# Patient Record
Sex: MALE | Race: WHITE | NOT HISPANIC OR LATINO | Employment: FULL TIME | URBAN - METROPOLITAN AREA
[De-identification: names, ages, dates, MRNs, and addresses within clinical notes are randomized per-mention and may not be internally consistent; named-entity substitution may affect disease eponyms.]

---

## 2019-04-15 ENCOUNTER — PATIENT OUTREACH (OUTPATIENT)
Dept: GASTROENTEROLOGY | Facility: CLINIC | Age: 39
End: 2019-04-15

## 2019-04-15 NOTE — PROGRESS NOTES
Sent a my chart message to pt about unable to fill his Lialda prescription. Pt had not read my chart message. Left a message that he has not been seen for over 3 years and the provider he was seeing has left the [facility].  That will be happy to set him up with another provider to establish care.  That can not refill Lialda until he was seen. Left my name and number.

## 2019-10-01 ENCOUNTER — HEALTH MAINTENANCE LETTER (OUTPATIENT)
Age: 39
End: 2019-10-01

## 2020-08-24 ENCOUNTER — TRANSFERRED RECORDS (OUTPATIENT)
Dept: HEALTH INFORMATION MANAGEMENT | Facility: CLINIC | Age: 40
End: 2020-08-24

## 2020-08-24 ENCOUNTER — MEDICAL CORRESPONDENCE (OUTPATIENT)
Dept: HEALTH INFORMATION MANAGEMENT | Facility: CLINIC | Age: 40
End: 2020-08-24

## 2020-08-25 ENCOUNTER — TRANSFERRED RECORDS (OUTPATIENT)
Dept: HEALTH INFORMATION MANAGEMENT | Facility: CLINIC | Age: 40
End: 2020-08-25

## 2020-08-27 ENCOUNTER — TRANSCRIBE ORDERS (OUTPATIENT)
Dept: OTHER | Age: 40
End: 2020-08-27

## 2020-08-27 DIAGNOSIS — F43.9 PSYCHOLOGICAL STRESS: ICD-10-CM

## 2020-08-27 DIAGNOSIS — K51.011: Primary | ICD-10-CM

## 2020-08-31 NOTE — TELEPHONE ENCOUNTER
RECORDS RECEIVED FROM: Dr. Delores Alvarado- [facility]    DATE RECEIVED: 9/30/2020   NOTES STATUS DETAILS   OFFICE NOTE from referring provider Received 8/24/2020 Office visit with Dr. Alvarado    OFFICE NOTE from other specialist Internal 8/12/15 Office visit with Dr. Moshe Bhatia (Med[facility])     6/2/15 Office visit with INDIRA Escobar CNP ([facility])    DISCHARGE SUMMARY from hospital N/A    OPERATIVE REPORT Received    MEDICATION LIST Internal         ENDOSCOPY  N/A    COLONOSCOPY Received 5/20/15 ([facility])    ERCP N/A    EUS N/A    STOOL TESTING N/A    PERTINENT LABS Internal    PATHOLOGY REPORTS (RELATED) Internal    IMAGING (CT, MRI, EGD) N/A      REFERRAL INFORMATION    Date referral was placed:    Date all records received:    Date records were scanned into EPIC:    Date records were sent to Provider to review:    Date and recommendation received from provider:  LETTER SENT  SCHEDULE APPOINTMENT   Date patient was contacted to schedule:      9/9/2020 5:12pm Called Pt but no answer and was unable to LVM because mailbox was full. CSS will try again later. -Bhao     9/16/2020 3:47pm Call Pt, no answer and unable to leave message (full mailbox). -Bhao     9/25/2020 10:46am Call Pt, no answer and unable to leave message (full mailbox). -Bhao    9/28/2020 10:19am Called Pt; unable to LVM (mailbox full). -Bhao    9/29/2020 11:29am Callled and spoke with Pt; per Pt, no additional outside med recs. Closing encounter now. -Bhao

## 2020-09-30 ENCOUNTER — VIRTUAL VISIT (OUTPATIENT)
Dept: GASTROENTEROLOGY | Facility: CLINIC | Age: 40
End: 2020-09-30
Attending: FAMILY MEDICINE
Payer: COMMERCIAL

## 2020-09-30 ENCOUNTER — PRE VISIT (OUTPATIENT)
Dept: GASTROENTEROLOGY | Facility: CLINIC | Age: 40
End: 2020-09-30

## 2020-09-30 VITALS — WEIGHT: 220 LBS | HEIGHT: 72 IN | BODY MASS INDEX: 29.8 KG/M2

## 2020-09-30 DIAGNOSIS — E55.9 VITAMIN D DEFICIENCY: ICD-10-CM

## 2020-09-30 DIAGNOSIS — K51.00 ULCERATIVE PANCOLITIS WITHOUT COMPLICATION (H): Primary | ICD-10-CM

## 2020-09-30 RX ORDER — ACETAMINOPHEN 325 MG/1
325-650 TABLET ORAL EVERY 6 HOURS PRN
COMMUNITY

## 2020-09-30 RX ORDER — MESALAMINE 1.2 G/1
TABLET, DELAYED RELEASE ORAL
Qty: 120 TABLET | Refills: 11 | Status: SHIPPED | OUTPATIENT
Start: 2020-09-30 | End: 2021-03-29

## 2020-09-30 ASSESSMENT — MIFFLIN-ST. JEOR: SCORE: 1945.91

## 2020-09-30 ASSESSMENT — PAIN SCALES - GENERAL: PAINLEVEL: MILD PAIN (2)

## 2020-09-30 NOTE — NURSING NOTE
Chief Complaint   Patient presents with     Consult     psychological stress, ulcerative pancolitis w/ intermittent BRBPR x8-9 since 8/2020       Vitals:    09/30/20 1516   Weight: 99.8 kg (220 lb)   Height: 1.829 m (6')       Body mass index is 29.84 kg/m .    Ayan Moe, EMT

## 2020-09-30 NOTE — PROGRESS NOTES
"Raymond Aguayo is a 40 year old male who is being evaluated via a billable video visit.      The patient has been notified of following:     \"This video visit will be conducted via a call between you and your physician/provider. We have found that certain health care needs can be provided without the need for an in-person physical exam.  This service lets us provide the care you need with a video conversation.  If a prescription is necessary we can send it directly to your pharmacy.  If lab work is needed we can place an order for that and you can then stop by our lab to have the test done at a later time.    Video visits are billed at different rates depending on your insurance coverage.  Please reach out to your insurance provider with any questions.    If during the course of the call the physician/provider feels a video visit is not appropriate, you will not be charged for this service.\"    Patient has given verbal consent for Video visit? Yes  How would you like to obtain your AVS? MyChart  If you are dropped from the video visit, the video invite should be resent to: Text to cell phone: [phone]  Will anyone else be joining your video visit? No        Video-Visit Details    Type of service:  Video Visit    Video Start Time: 3:48 PM  Video End Time: 4:35 PM    Originating Location (pt. Location): Home    Distant Location (provider location):  [facility]     [facility] for [facility]    Marry Win MD    IBD CLINIC VISIT     CC/REFERRING MD:  Delores Alvarado  REASON FOR CONSULTATION: Establish care for ulcerative colitis    ASSESSMENT/PLAN:     Mild ulcerative pancolitis  We discussed how it would be prudent for him to stay on low-dose daily Lialda, to avoid worsening flares and to control low-level mucosal inflammation and Hakan is amenable to this adjustment. He should have a colonoscopy in about 3 months once he has healed from his flare over the " summer.  - increase Lialda back to 4.8g daily for three months until colonoscopy   - schedule colonoscopy in about 3 months    - if still significant inflammation, we would need to discuss changing medications  - get liver labs with next check  - start 2000 international unit(s) vitamin D daily and recheck in 3 month   - screen renal function every 6-12 months while on Lialda  - update vaccinations including pneumonia and shingles    Chronic low back pain  Started in his 20s and worse in the morning. Doesn't seem to correspond to flares, but does raise concern for ankylosing spondylitis. We will watch to see if his symptoms improve with having him maintain on 5-ASA. If not would get SI joint xrays and check HLA-B27. If findings are consistent with ankylosing spondylitis would then consider switch to biologic.    ATTENDING ATTESTATION:    Virtual visit for patient conducted via three way video conference with myself, patient and Dr. Win.   I reviewed the history and abbreviated physical exam and discussed the management with Dr. Win on 9/30/2020. I reviewed the note and there are no changes to the past medical, family or social history.  A complete 10 point review of systems was obtained. Please see the HPI for pertinent positives and negatives. All other systems were reviewed and were found to be negative. I agree with the documented findings and plan of care as outlined.    He has responded well to Lialda. Discussed importance of taking it long term. Will increase to 4.8 grams daily until repeat colonoscopy in 3 months. If mucosal healing seen will then decrease to 2.8 grams daily. If still inflammation then will discuss escalation of therapy. Follow low back pain as above. All questions answered and he agrees to the plan.    Colton Valentin MD  Inflammatory Bowel Disease Clinic  [facility]  Division of Gastroenterology, Hepatology and  Nutrition        ----------------------------------------------------    IBD HISTORY  Age at diagnosis: 2014  Extent of disease: colon  Current UC medications: Lialda, intermittent  Prior UC surgeries: none  Prior IBD Medications: none    DRUG MONITORING  TPMT enzyme activity: na    6-TGN/6-MMPN levels:na    Biologic concentration:na    DISEASE ASSESSMENT  Labs:  Recent Labs   Lab Test 08/12/15  1338   CRP <2.9     Endoscopic assessment: pancolitis, 2015  Enterography: na  Fecal calprotectin: na  C diff: na    sIBDQ:  No flowsheet data found.     IBD Health Care Maintenance:    Vaccinations:  All patients on biologics should avoid live vaccines.    -- Influenza (every year)  -- TdaP (every 10 years)  -- Pneumococcal Pneumonia (once plus booster at 5 years)  -- Yearly assessment for latent Tb (verbal screening and exam, PPD or QuantiFERON-Tb testing)    One time confirmation of immunity or serologies:  -- Hepatitis A (serologies or immunizations)  -- Hepatitis B (serologies or immunizations)  -- Varicella  -- MMR  -- HPV (all aged 18-26)  -- Meningococcal meningitis (all patients at risk for meningitis)--   -- ok for live vaccines    Bone mineral density screening   -- Recommend all patients supplement with calcium and vitamin D    Cancer Screening:  Colon cancer screening:  Given panUC, recommend patient undergo regular dysplasia surveillance   Next dysplasia screening is recommended 2020.    Skin cancer screening: Annual visual exam of skin by dermatologist since patient is immunocompromised    Depression Screening:  -- Over the last month, have you felt down, depressed, or hopeless?  -- Over the last month, have you felt little interest or pleasure doing things?     Misc:  -- Avoid tobacco use  -- Avoid NSAIDs as there is potentially a 25% chance of causing an IBD flare    Return to clinic in 3 months    Thank you for this consultation.  It was a pleasure to participate in the care of this patient; please contact  us with any further questions.  I spent a total of 45 minutes, face to face, was spent with this patient, >50% of which was counseling regarding the above delineated issues.    Discussed with Dr Valentin.    Marry Win MD  Internal Medicine PGY3  Pager: [phone]      HPI:   Hakan is very pleasant 40 year old man with a history of mild ulcerative colitis. He was diagnosed by biopsy in 2015 after developing bloody diarrhea following a bout of traveler's diarrhea in [facility] in fall 2014, and has been maintained on intermittent Lialda since then. He gets flares about once a year, and will resume Lialda under the care of his PCP and then taper off when he is feeling better. He saw Dr Bhatia here twice in 2015 and has not seen GI since then.    He had a flare in August with bloody diarrhea; had not had much bleeding in the past. His PCP at [facility] restarted him on Lialda (taking 1 pill BID, had started with 2 pills BID). This prompted his PCP to refer him back to GI. His prior flare was spring of 2019. 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 His worst flares are at the beginning or end of semester.    He has occasional urgency or loose stools since his flare (1 day in the past week). Normal stool pattern is 2-3 formed stools daily.    Does have chronic low back pain, worse in the morning. He used to run marathons and had low back pain then as well; feels it's worse since he has been running less.      SURGICAL HISTORY:  [place] teeth removal in 2009 with local anesthetic only.  Colonoscopy in 05/2015.      FAMILY HISTORY:  No family history of colon cancer or autoimmune disease.      SOCIAL HISTORY:   with 4 children, works as an  at the [employer]    ROS:    No fevers or chills  No weight loss  No blurry vision, double vision or change in vision  No sore throat  No lymphadenopathy  No headache,  paraesthesias, or weakness in a limb  No shortness of breath or wheezing  No chest pain or pressure  No arthralgias or myalgias  No rashes or skin changes  No odynophagia or dysphagia  No BRBPR, hematochezia, melena  No dysuria, frequency or urgency  No hot/cold intolerance or polyria  No anxiety or depression    Extra intestinal manifestations of IBD:  No uveitis/episcleritis  No aphthous ulcers   + arthritis   No erythema nodosum/pyoderma gangrenosum.     PERTINENT PAST MEDICAL HISTORY:  Past Medical History:   Diagnosis Date     Ulcerative colitis (H) 2015     Wrist fracture, right 1992    minor, sports       PREVIOUS SURGERIES:  Past Surgical History:   Procedure Laterality Date     COLONOSCOPY  5/2015     HC TOOTH EXTRACTION W/FORCEP  2009       PREVIOUS ENDOSCOPY:  2015 colon at [facility] with Frausto 1 pancolitis    ALLERGIES:   No Known Allergies    PERTINENT MEDICATIONS:    Current Outpatient Medications:      Lactobacillus Acid-Pectin (LACTOBACILLUS ACIDOPHILUS) TABS, Take 1 tablet by mouth 3 times daily (before meals), Disp: , Rfl:      Loratadine-Pseudoephedrine (CLARITIN-D 24 HOUR PO), , Disp: , Rfl:      mesalamine (LIALDA) 1.2 G EC tablet, Take 4 tablets (4,800 mg) by mouth every morning, Disp: 180 tablet, Rfl: 3     mesalamine (LIALDA) 1.2 G EC tablet, Take 2 tablets (2,400 mg) by mouth every morning, Disp: 180 tablet, Rfl: 3    SOCIAL HISTORY:  Social History     Socioeconomic History     Marital status:      Spouse name: Not on file     Number of children: Not on file     Years of education: Not on file     Highest education level: Not on file   Occupational History     Not on file   Social Needs     Financial resource strain: Not on file     Food insecurity     Worry: Not on file     Inability: Not on file     Transportation needs     Medical: Not on file     Non-medical: Not on file   Tobacco Use     Smoking status: Never Smoker     Smokeless tobacco: Never Used   Substance and Sexual Activity      Alcohol use: Yes     Comment: 1/day, beer or scotch     Drug use: No     Sexual activity: Not on file   Lifestyle     Physical activity     Days per week: Not on file     Minutes per session: Not on file     Stress: Not on file   Relationships     Social connections     Talks on phone: Not on file     Gets together: Not on file     Attends Zoroastrianism service: Not on file     Active member of club or organization: Not on file     Attends meetings of clubs or organizations: Not on file     Relationship status: Not on file     Intimate partner violence     Fear of current or ex partner: Not on file     Emotionally abused: Not on file     Physically abused: Not on file     Forced sexual activity: Not on file   Other Topics Concern     Not on file   Social History Narrative     Not on file       FAMILY HISTORY:  Family History   Problem Relation Age of Onset     Other Cancer No family hx of      Lung Cancer Paternal Grandfather         smoker, 55       Past/family/social history reviewed and no changes    PHYSICAL EXAMINATION:  Constitutional: aaox3, cooperative, pleasant, not dyspneic/diaphoretic, no acute distress  Vitals reviewed: There were no vitals taken for this visit.  Wt:   Wt Readings from Last 2 Encounters:   08/12/15 97.9 kg (215 lb 12.8 oz)   06/02/15 93.8 kg (206 lb 12.8 oz)       PERTINENT STUDIES:  Most recent CBC:  Recent Labs   Lab Test 08/12/15  1338   WBC 6.5   HGB 15.2   HCT 43.1        Most recent hepatic panel:  No lab results found.    Invalid input(s): DEMETRIS, ALP  Most recent creatinine:  Recent Labs   Lab Test 08/12/15  1338   CR 0.99

## 2020-09-30 NOTE — PATIENT INSTRUCTIONS
- increase Lialda to two pills twice a day for the next three months and then we will do a colonoscopy to check on the inflammation in your colon.  - schedule colonoscopy in about 3 months  - get liver labs with next check (you can do this in 3 months when we recheck your vitamin D)  - start 2000 IU vitamin D daily - you can buy this at the drug store - and recheck in 3 month   - screen renal function every 6-12 months while on Lialda  - make an appointment with your primary to get your flu shot and your second pneumonia shot. Check with your insurance company about getting the shingles shot (Shingrix) as well

## 2020-10-01 ENCOUNTER — TELEPHONE (OUTPATIENT)
Dept: GASTROENTEROLOGY | Facility: CLINIC | Age: 40
End: 2020-10-01

## 2020-10-01 DIAGNOSIS — Z11.59 ENCOUNTER FOR SCREENING FOR OTHER VIRAL DISEASES: Primary | ICD-10-CM

## 2020-10-01 NOTE — TELEPHONE ENCOUNTER
Attempted to contact patient to inform him of new start time for procedure. Instead of 8am with Jamie it will now be with Dr. Valentin at 7am as he is the ordering provider. Unable to leave message as VM is full and cannot receive additional messages. Will attempt to contact patient again tomorrow.

## 2020-12-31 DIAGNOSIS — Z11.59 ENCOUNTER FOR SCREENING FOR OTHER VIRAL DISEASES: ICD-10-CM

## 2020-12-31 LAB
SARS-COV-2 RNA SPEC QL NAA+PROBE: NORMAL
SPECIMEN SOURCE: NORMAL

## 2021-01-01 LAB
LABORATORY COMMENT REPORT: NORMAL
SARS-COV-2 RNA SPEC QL NAA+PROBE: NEGATIVE
SPECIMEN SOURCE: NORMAL

## 2021-01-04 ENCOUNTER — HOSPITAL ENCOUNTER (OUTPATIENT)
Facility: AMBULATORY SURGERY CENTER | Age: 41
Discharge: HOME | End: 2021-01-04
Attending: INTERNAL MEDICINE | Admitting: INTERNAL MEDICINE
Payer: COMMERCIAL

## 2021-01-04 VITALS
HEIGHT: 72 IN | DIASTOLIC BLOOD PRESSURE: 74 MMHG | HEART RATE: 79 BPM | SYSTOLIC BLOOD PRESSURE: 117 MMHG | TEMPERATURE: 97.2 F | BODY MASS INDEX: 29.8 KG/M2 | WEIGHT: 220 LBS | RESPIRATION RATE: 14 BRPM | OXYGEN SATURATION: 96 %

## 2021-01-04 LAB — COLONOSCOPY: NORMAL

## 2021-01-04 PROCEDURE — 88305 TISSUE EXAM BY PATHOLOGIST: CPT | Mod: GC | Performed by: PATHOLOGY

## 2021-01-04 PROCEDURE — 45380 COLONOSCOPY AND BIOPSY: CPT | Mod: 33

## 2021-01-04 RX ORDER — PROCHLORPERAZINE MALEATE 10 MG
10 TABLET ORAL EVERY 6 HOURS PRN
Status: CANCELLED | OUTPATIENT
Start: 2021-01-04

## 2021-01-04 RX ORDER — FENTANYL CITRATE 50 UG/ML
INJECTION, SOLUTION INTRAMUSCULAR; INTRAVENOUS PRN
Status: DISCONTINUED | OUTPATIENT
Start: 2021-01-04 | End: 2021-01-04 | Stop reason: HOSPADM

## 2021-01-04 RX ORDER — NALOXONE HYDROCHLORIDE 0.4 MG/ML
0.2 INJECTION, SOLUTION INTRAMUSCULAR; INTRAVENOUS; SUBCUTANEOUS
Status: CANCELLED | OUTPATIENT
Start: 2021-01-04 | End: 2021-01-05

## 2021-01-04 RX ORDER — ONDANSETRON 2 MG/ML
4 INJECTION INTRAMUSCULAR; INTRAVENOUS
Status: DISCONTINUED | OUTPATIENT
Start: 2021-01-04 | End: 2021-01-05 | Stop reason: HOSPADM

## 2021-01-04 RX ORDER — FLUMAZENIL 0.1 MG/ML
0.2 INJECTION, SOLUTION INTRAVENOUS
Status: CANCELLED | OUTPATIENT
Start: 2021-01-04 | End: 2021-01-04

## 2021-01-04 RX ORDER — SIMETHICONE
LIQUID (ML) MISCELLANEOUS PRN
Status: DISCONTINUED | OUTPATIENT
Start: 2021-01-04 | End: 2021-01-04 | Stop reason: HOSPADM

## 2021-01-04 RX ORDER — NALOXONE HYDROCHLORIDE 0.4 MG/ML
0.4 INJECTION, SOLUTION INTRAMUSCULAR; INTRAVENOUS; SUBCUTANEOUS
Status: CANCELLED | OUTPATIENT
Start: 2021-01-04 | End: 2021-01-05

## 2021-01-04 RX ORDER — ONDANSETRON 4 MG/1
4 TABLET, ORALLY DISINTEGRATING ORAL EVERY 6 HOURS PRN
Status: CANCELLED | OUTPATIENT
Start: 2021-01-04

## 2021-01-04 RX ORDER — LIDOCAINE 40 MG/G
CREAM TOPICAL
Status: DISCONTINUED | OUTPATIENT
Start: 2021-01-04 | End: 2021-01-05 | Stop reason: HOSPADM

## 2021-01-04 RX ORDER — ONDANSETRON 2 MG/ML
4 INJECTION INTRAMUSCULAR; INTRAVENOUS EVERY 6 HOURS PRN
Status: CANCELLED | OUTPATIENT
Start: 2021-01-04

## 2021-01-04 ASSESSMENT — MIFFLIN-ST. JEOR: SCORE: 1945.91

## 2021-01-05 LAB — COPATH REPORT: NORMAL

## 2021-01-06 ENCOUNTER — CONFIDENTIAL (OUTPATIENT)
Dept: GASTROENTEROLOGY | Facility: CLINIC | Age: 41
End: 2021-01-06

## 2021-01-06 ENCOUNTER — PATIENT OUTREACH (OUTPATIENT)
Dept: GASTROENTEROLOGY | Facility: CLINIC | Age: 41
End: 2021-01-06

## 2021-01-06 DIAGNOSIS — K51.90 ULCERATIVE COLITIS (H): Primary | ICD-10-CM

## 2021-01-06 NOTE — PROGRESS NOTES
Patient ws contacted to complete labs.           Pt needs follow up with me in in the next 2-4 weeks.     Prior to that we need CBC, LFTs, BMP, ESR, CRP and hepatitis B core Ab, hep B surface antigen, hep B surface antibody, TB quant and TPMT enzyme.     Please help arrange

## 2021-01-07 DIAGNOSIS — K51.90 ULCERATIVE COLITIS (H): ICD-10-CM

## 2021-01-07 DIAGNOSIS — E55.9 VITAMIN D DEFICIENCY: ICD-10-CM

## 2021-01-07 LAB
ALBUMIN SERPL-MCNC: 3.8 G/DL (ref 3.4–5)
ALP SERPL-CCNC: 96 U/L (ref 40–150)
ALT SERPL W P-5'-P-CCNC: 56 U/L (ref 0–70)
ANION GAP SERPL CALCULATED.3IONS-SCNC: 1 MMOL/L (ref 3–14)
AST SERPL W P-5'-P-CCNC: 20 U/L (ref 0–45)
BASOPHILS # BLD AUTO: 0 10E9/L (ref 0–0.2)
BASOPHILS NFR BLD AUTO: 0.8 %
BILIRUB DIRECT SERPL-MCNC: 0.1 MG/DL (ref 0–0.2)
BILIRUB SERPL-MCNC: 0.5 MG/DL (ref 0.2–1.3)
BUN SERPL-MCNC: 13 MG/DL (ref 7–30)
CALCIUM SERPL-MCNC: 9.1 MG/DL (ref 8.5–10.1)
CHLORIDE SERPL-SCNC: 107 MMOL/L (ref 94–109)
CO2 SERPL-SCNC: 32 MMOL/L (ref 20–32)
CREAT SERPL-MCNC: 0.96 MG/DL (ref 0.66–1.25)
CRP SERPL-MCNC: 4 MG/L (ref 0–8)
DEPRECATED CALCIDIOL+CALCIFEROL SERPL-MC: 14 UG/L (ref 20–75)
DIFFERENTIAL METHOD BLD: NORMAL
EOSINOPHIL # BLD AUTO: 0.1 10E9/L (ref 0–0.7)
EOSINOPHIL NFR BLD AUTO: 1 %
ERYTHROCYTE [DISTWIDTH] IN BLOOD BY AUTOMATED COUNT: 12 % (ref 10–15)
ERYTHROCYTE [SEDIMENTATION RATE] IN BLOOD BY WESTERGREN METHOD: 9 MM/H (ref 0–15)
GFR SERPL CREATININE-BSD FRML MDRD: >90 ML/MIN/{1.73_M2}
GLUCOSE SERPL-MCNC: 99 MG/DL (ref 70–99)
HBV CORE AB SERPL QL IA: NONREACTIVE
HBV SURFACE AB SERPL IA-ACNC: 98.23 M[IU]/ML
HBV SURFACE AG SERPL QL IA: NONREACTIVE
HCT VFR BLD AUTO: 44.2 % (ref 40–53)
HGB BLD-MCNC: 14.9 G/DL (ref 13.3–17.7)
IMM GRANULOCYTES # BLD: 0 10E9/L (ref 0–0.4)
IMM GRANULOCYTES NFR BLD: 0.4 %
LYMPHOCYTES # BLD AUTO: 1.1 10E9/L (ref 0.8–5.3)
LYMPHOCYTES NFR BLD AUTO: 21.6 %
MCH RBC QN AUTO: 29.2 PG (ref 26.5–33)
MCHC RBC AUTO-ENTMCNC: 33.7 G/DL (ref 31.5–36.5)
MCV RBC AUTO: 87 FL (ref 78–100)
MONOCYTES # BLD AUTO: 0.6 10E9/L (ref 0–1.3)
MONOCYTES NFR BLD AUTO: 12 %
NEUTROPHILS # BLD AUTO: 3.4 10E9/L (ref 1.6–8.3)
NEUTROPHILS NFR BLD AUTO: 64.2 %
NRBC # BLD AUTO: 0 10*3/UL
NRBC BLD AUTO-RTO: 0 /100
PLATELET # BLD AUTO: 273 10E9/L (ref 150–450)
POTASSIUM SERPL-SCNC: 4.2 MMOL/L (ref 3.4–5.3)
PROT SERPL-MCNC: 7.5 G/DL (ref 6.8–8.8)
RBC # BLD AUTO: 5.1 10E12/L (ref 4.4–5.9)
SODIUM SERPL-SCNC: 140 MMOL/L (ref 133–144)
WBC # BLD AUTO: 5.2 10E9/L (ref 4–11)

## 2021-01-07 PROCEDURE — 36415 COLL VENOUS BLD VENIPUNCTURE: CPT | Performed by: PATHOLOGY

## 2021-01-07 PROCEDURE — 85652 RBC SED RATE AUTOMATED: CPT | Performed by: PATHOLOGY

## 2021-01-07 PROCEDURE — 86140 C-REACTIVE PROTEIN: CPT | Performed by: PATHOLOGY

## 2021-01-07 PROCEDURE — 86481 TB AG RESPONSE T-CELL SUSP: CPT | Mod: 90 | Performed by: PATHOLOGY

## 2021-01-07 PROCEDURE — 99000 SPECIMEN HANDLING OFFICE-LAB: CPT | Performed by: PATHOLOGY

## 2021-01-07 PROCEDURE — 80076 HEPATIC FUNCTION PANEL: CPT | Performed by: PATHOLOGY

## 2021-01-07 PROCEDURE — 86706 HEP B SURFACE ANTIBODY: CPT | Mod: 90 | Performed by: PATHOLOGY

## 2021-01-07 PROCEDURE — 86704 HEP B CORE ANTIBODY TOTAL: CPT | Mod: 90 | Performed by: PATHOLOGY

## 2021-01-07 PROCEDURE — 87340 HEPATITIS B SURFACE AG IA: CPT | Mod: 90 | Performed by: PATHOLOGY

## 2021-01-07 PROCEDURE — 82657 ENZYME CELL ACTIVITY: CPT | Mod: 90 | Performed by: PATHOLOGY

## 2021-01-07 PROCEDURE — 80048 BASIC METABOLIC PNL TOTAL CA: CPT | Performed by: PATHOLOGY

## 2021-01-07 PROCEDURE — 82306 VITAMIN D 25 HYDROXY: CPT | Mod: 90 | Performed by: PATHOLOGY

## 2021-01-07 PROCEDURE — 85025 COMPLETE CBC W/AUTO DIFF WBC: CPT | Performed by: PATHOLOGY

## 2021-01-08 LAB
GAMMA INTERFERON BACKGROUND BLD IA-ACNC: 0.03 IU/ML
M TB IFN-G CD4+ BCKGRND COR BLD-ACNC: 9.97 IU/ML
M TB TUBERC IFN-G BLD QL: NEGATIVE
MITOGEN IGNF BCKGRD COR BLD-ACNC: 0.01 IU/ML
MITOGEN IGNF BCKGRD COR BLD-ACNC: 0.04 IU/ML

## 2021-01-10 LAB — TPMT BLD-CCNC: 24.4 U/ML (ref 24–44)

## 2021-01-15 ENCOUNTER — HEALTH MAINTENANCE LETTER (OUTPATIENT)
Age: 41
End: 2021-01-15

## 2021-01-27 ENCOUNTER — VIRTUAL VISIT (OUTPATIENT)
Dept: GASTROENTEROLOGY | Facility: CLINIC | Age: 41
End: 2021-01-27
Payer: COMMERCIAL

## 2021-01-27 VITALS — HEIGHT: 72 IN | WEIGHT: 220 LBS | BODY MASS INDEX: 29.8 KG/M2

## 2021-01-27 DIAGNOSIS — K51.00 ULCERATIVE PANCOLITIS WITHOUT COMPLICATION (H): Primary | ICD-10-CM

## 2021-01-27 PROCEDURE — 99214 OFFICE O/P EST MOD 30 MIN: CPT | Mod: 95 | Performed by: INTERNAL MEDICINE

## 2021-01-27 ASSESSMENT — MIFFLIN-ST. JEOR: SCORE: 1945.91

## 2021-01-27 ASSESSMENT — PAIN SCALES - GENERAL: PAINLEVEL: NO PAIN (0)

## 2021-01-27 NOTE — NURSING NOTE
Chief Complaint   Patient presents with     RECHECK     Follow up u/c.       Vitals:    01/27/21 1245   Weight: 99.8 kg (220 lb)   Height: 1.829 m (6')       Body mass index is 29.84 kg/m .                            MAO HOWARD, EMT

## 2021-01-27 NOTE — PROGRESS NOTES
.  IBD CLINIC VISIT     CC/REFERRING MD:  No ref. provider found  REASON FOR CONSULTATION: follow up UC    ASSESSMENT/PLAN    1. Ulcerative colitis:     Recent colonoscopy did show persistent nail and inflammation throughout the colon.  There was relative sparing of the rectum but this is likely due to patchy healing.  We discussed that even though he has been on the Lialda 4.8 g/day he still has inflammation in the colon and so therefore we need to escalate therapy.  He is willing to do this.  We discussed multiple biologic options including anti-TNF agents (infliximab and adalimumab), anti-Integrilin agents (vedolizumab), and antiinterleukin agents (ustekinumab).  Risks and benefits of all were discussed.  Patient would like to proceed with vedolizumab as long as his insurance will approve this.  We will therefore move forward with requesting a prior authorization for vedolizumab.    We will follow-up in 3 months time.  I would like him to continue on the Lialda 4.8 g/day.  We discussed that he will likely do better in remembering to take this if he takes all 4 pills at once.    I would like to check a flexible sigmoidoscopy to evaluate for mucosal healing in 6 to 9 months.    2. IBD dysplasia surveillance:   He will be due for this in 2022.    3.  Healthcare maintenance-we will refer him to our IBD pharmacist to discuss this in more detail.  We have discussed taking calcium and vitamin D daily.  He knows to avoid NSAIDs.    Return to clinic in 3 months    Thank you for this consultation.  It was a pleasure to participate in the care of this patient; please contact us with any further questions.      This note was created with voice recognition software, and while reviewed for accuracy, typos may remain.     Colton Valentin MD  Inflammatory Bowel Disease Program   Division of Gastroenterology, Hepatology and Nutrition  [facility]  Pager: [phone]      IBD HISTORY  Age at diagnosis: 2014  Extent  of disease: colon  Current UC medications: Lialda 4.8 gm/day  Prior UC surgeries: none  Prior IBD Medications: none    Most recent colonoscopy: 21:  Impression:          - Preparation of the colon was fair.                        - The examined portion of the ileum was normal.                        - Mild (Frausto Score 1) pancolitis ulcerative colitis.                        Biopsied.                        - The rectum is normal. Biopsied.                        - The examination was otherwise normal on direct and                        retroflexion views.                        - Consistent with mild pan-ulcerative colitis. Given                        rectal sparing Crohn's colitis can [place] considered but                        this is felt to be less likely. It is more likely that                        he has patchy healing of his UC on therapy.       Colon path:  A. CECUM AND ASCENDING COLON, BIOPSIES:   - Moderately active chronic colitis with cryptitis and crypt abscesses   (Analia grade 3)   - Negative for dysplasia     B. TRANSVERSE COLON, BIOPSIES:   - Moderately active chronic colitis with cryptitis and crypt abscesses   (Analia grade 3)   - Negative for dysplasia     C. DESCENDING AND SIGMOID COLON, BIOPSIES:   - Moderately active chronic colitis with cryptitis and crypt abscesses   (Analia grade 3)   - Negative for dysplasia     D. RECTUM, BIOPSIES:   - Mild focal active chronic colitis with rare cryptitis (Analia grade 2)   - Negative for dysplasia     DRUG MONITORING  TPMT enzyme activity:     6-TGN/6-MMPN levels:    Biologic concentration:    HPI:   Currently    Frausto score:   Stool freq: 1 (1-2 stools/day more than normal)  Rectal bleedin (Visible blood less than half of the time)  PGA: 1 (Mild)  Endoscopy: 1 (Mild)    Extra intestinal manifestations of IBD:  No uveitis/episcleritis  No aphthous ulcers   No arthritis   No erythema nodosum/pyoderma gangrenosum.     sIBDQ:  No flowsheet data found.      Last endoscopic activity:  Last histologic activity:      Pertinent labs / imaging:     ROS:    Constitutional, HEENT, cardiovascular, pulmonary, GI, , musculoskeletal, neuro, skin, endocrine and psych systems are negative, except as otherwise noted.    PHYSICAL EXAMINATION:  Constitutional: aaox3, cooperative, pleasant, not dyspneic/diaphoretic, no acute distress  Vitals reviewed: There were no vitals taken for this visit.  Wt:   Wt Readings from Last 2 Encounters:   01/04/21 99.8 kg (220 lb)   09/30/20 99.8 kg (220 lb)      Eyes: Sclera anicteric/injected  Respiratory: Unlabored breathing  Skin: no jaundice  Psych: Normal affect      PERTINENT PAST MEDICAL HISTORY:  Past Medical History:   Diagnosis Date     Ulcerative colitis (H) 2015     Wrist fracture, right 1992    minor, sports       PREVIOUS SURGERIES:  Past Surgical History:   Procedure Laterality Date     COLONOSCOPY  5/2015     COLONOSCOPY N/A 1/4/2021    Procedure: COLONOSCOPY, WITH  BIOPSY;  Surgeon: Colton Valentin MD;  Location: [facility]      TOOTH EXTRACTION W/FORCEP  2009       ALLERGIES:   No Known Allergies    PERTINENT MEDICATIONS:    Current Outpatient Medications:      acetaminophen (TYLENOL) 325 MG tablet, Take 325-650 mg by mouth every 6 hours as needed for mild pain, Disp: , Rfl:      Loratadine-Pseudoephedrine (CLARITIN-D 24 HOUR PO), , Disp: , Rfl:      mesalamine (LIALDA) 1.2 g EC tablet, Take 2 tablets (2,400 mg) by mouth 2 times daily for 90 days, THEN 1 tablet (1,200 mg) 2 times daily., Disp: 120 tablet, Rfl: 11     mesalamine (LIALDA) 1.2 G EC tablet, Take 2 tablets (2,400 mg) by mouth every morning (Patient taking differently: Take 1,200 mg by mouth 2 times daily ), Disp: 180 tablet, Rfl: 3    SOCIAL HISTORY:  Social History     Socioeconomic History     Marital status:      Spouse name: Not on file     Number of children: Not on file     Years of education: Not on file     Highest education level: Not on file    Occupational History     Not on file   Social Needs     Financial resource strain: Not on file     Food insecurity     Worry: Not on file     Inability: Not on file     Transportation needs     Medical: Not on file     Non-medical: Not on file   Tobacco Use     Smoking status: Never Smoker     Smokeless tobacco: Never Used   Substance and Sexual Activity     Alcohol use: Yes     Comment: 1/day, beer or scotch     Drug use: No     Sexual activity: Not on file   Lifestyle     Physical activity     Days per week: Not on file     Minutes per session: Not on file     Stress: Not on file   Relationships     Social connections     Talks on phone: Not on file     Gets together: Not on file     Attends Holiness service: Not on file     Active member of club or organization: Not on file     Attends meetings of clubs or organizations: Not on file     Relationship status: Not on file     Intimate partner violence     Fear of current or ex partner: Not on file     Emotionally abused: Not on file     Physically abused: Not on file     Forced sexual activity: Not on file   Other Topics Concern     Not on file   Social History Narrative     Not on file       FAMILY HISTORY:  Family History   Problem Relation Age of Onset     Lung Cancer Paternal Grandfather         smoker, 55     Colon Cancer No family hx of      Crohn's Disease No family hx of      Irritable Bowel Syndrome No family hx of      Inflammatory Bowel Disease No family hx of      Ulcerative Colitis No family hx of        Past/family/social history reviewed and no changes

## 2021-01-27 NOTE — PROGRESS NOTES
"Raymond Aguayo is a 40 year old male who is being evaluated via a billable video visit.      The patient has been notified of following:     \"This video visit will be conducted via a call between you and your physician/provider. We have found that certain health care needs can be provided without the need for an in-person physical exam.  This service lets us provide the care you need with a video conversation.  If a prescription is necessary we can send it directly to your pharmacy.  If lab work is needed we can place an order for that and you can then stop by our lab to have the test done at a later time.    If during the course of the call the physician/provider feels a video visit is not appropriate, you will not be charged for this service.\"     Patient confirmed that they are in [place] for today's visit yes    If the video visit is dropped, please send link to:   Text to cell phone: [phone]    Video-Visit Details  Type of service:  Video Visit    Video Start Time: 12:59 PM  Video End Time:  1:19 PM    Originating Location (pt. Location): Home    Distant Location (provider location):  [facility] [place]     Platform used: Kiana          "

## 2021-01-27 NOTE — PATIENT INSTRUCTIONS
It was nice to meet with you today at your video visit.    As we discussed we will move forward with seeking approval for the vedolizumab infusions.  My office will contact you about this.    We will schedule your visit with our pharmacist to discuss healthcare maintenance and inflammatory bowel disease.    Please continue the Lialda 4.8 g/day.  You can take all pills at once in the morning.    We will follow-up in 3 months to see how you are doing.    We will plan to repeat flexible sigmoidoscopy in 6 to 9 months to see if there is adequate healing in your colon.

## 2021-01-27 NOTE — LETTER
1/27/2021         RE: Raymond Aguayo  [address]  [place] [facility] [place] [address]        Dear Colleague,    Thank you for referring your patient, Raymond Aguayo, to the [facility] [place]. Please see a copy of my visit note below.    .  IBD CLINIC VISIT     CC/REFERRING MD:  No ref. provider found  REASON FOR CONSULTATION: follow up UC    ASSESSMENT/PLAN    1. Ulcerative colitis:     Recent colonoscopy did show persistent nail and inflammation throughout the colon.  There was relative sparing of the rectum but this is likely due to patchy healing.  We discussed that even though he has been on the Lialda 4.8 g/day he still has inflammation in the colon and so therefore we need to escalate therapy.  He is willing to do this.  We discussed multiple biologic options including anti-TNF agents (infliximab and adalimumab), anti-Integrilin agents (vedolizumab), and antiinterleukin agents (ustekinumab).  Risks and benefits of all were discussed.  Patient would like to proceed with vedolizumab as long as his insurance will approve this.  We will therefore move forward with requesting a prior authorization for vedolizumab.    We will follow-up in 3 months time.  I would like him to continue on the Lialda 4.8 g/day.  We discussed that he will likely do better in remembering to take this if he takes all 4 pills at once.    I would like to check a flexible sigmoidoscopy to evaluate for mucosal healing in 6 to 9 months.    2. IBD dysplasia surveillance:   He will be due for this in 2022.    3.  Healthcare maintenance-we will refer him to our IBD pharmacist to discuss this in more detail.  We have discussed taking calcium and vitamin D daily.  He knows to avoid NSAIDs.    Return to clinic in 3 months    Thank you for this consultation.  It was a pleasure to participate in the care of this patient; please contact us with any further questions.      This note was created with voice recognition  software, and while reviewed for accuracy, typos may remain.     Colton Valentin MD  Inflammatory Bowel Disease Program   Division of Gastroenterology, Hepatology and Nutrition  [facility]  Pager: [phone]      IBD HISTORY  Age at diagnosis: [age]  Extent of disease: colon  Current UC medications: Lialda 4.8 gm/day  Prior UC surgeries: none  Prior IBD Medications: none    Most recent colonoscopy: 1/4/21:  Impression:          - Preparation of the colon was fair.                        - The examined portion of the ileum was normal.                        - Mild (Frausto Score 1) pancolitis ulcerative colitis.                        Biopsied.                        - The rectum is normal. Biopsied.                        - The examination was otherwise normal on direct and                        retroflexion views.                        - Consistent with mild pan-ulcerative colitis. Given                        rectal sparing Crohn's colitis can [place] considered but                        this is felt to be less likely. It is more likely that                        he has patchy healing of his UC on therapy.       Colon path:  A. CECUM AND ASCENDING COLON, BIOPSIES:   - Moderately active chronic colitis with cryptitis and crypt abscesses   (Analia grade 3)   - Negative for dysplasia     B. TRANSVERSE COLON, BIOPSIES:   - Moderately active chronic colitis with cryptitis and crypt abscesses   (Analia grade 3)   - Negative for dysplasia     C. DESCENDING AND SIGMOID COLON, BIOPSIES:   - Moderately active chronic colitis with cryptitis and crypt abscesses   (Analia grade 3)   - Negative for dysplasia     D. RECTUM, BIOPSIES:   - Mild focal active chronic colitis with rare cryptitis (Analia grade 2)   - Negative for dysplasia     DRUG MONITORING  TPMT enzyme activity:     6-TGN/6-MMPN levels:    Biologic concentration:    HPI:   Currently    Frausto score:   Stool freq: 1 (1-2 stools/day more than normal)  Rectal  bleedin (Visible blood less than half of the time)  PGA: 1 (Mild)  Endoscopy: 1 (Mild)    Extra intestinal manifestations of IBD:  No uveitis/episcleritis  No aphthous ulcers   No arthritis   No erythema nodosum/pyoderma gangrenosum.     sIBDQ:  No flowsheet data found.     Last endoscopic activity:  Last histologic activity:      Pertinent labs / imaging:     ROS:    Constitutional, HEENT, cardiovascular, pulmonary, GI, , musculoskeletal, neuro, skin, endocrine and psych systems are negative, except as otherwise noted.    PHYSICAL EXAMINATION:  Constitutional: aaox3, cooperative, pleasant, not dyspneic/diaphoretic, no acute distress  Vitals reviewed: There were no vitals taken for this visit.  Wt:   Wt Readings from Last 2 Encounters:   21 99.8 kg (220 lb)   20 99.8 kg (220 lb)      Eyes: Sclera anicteric/injected  Respiratory: Unlabored breathing  Skin: no jaundice  Psych: Normal affect      PERTINENT PAST MEDICAL HISTORY:  Past Medical History:   Diagnosis Date     Ulcerative colitis (H)      Wrist fracture, right 1992    minor, sports       PREVIOUS SURGERIES:  Past Surgical History:   Procedure Laterality Date     COLONOSCOPY  2015     COLONOSCOPY N/A 2021    Procedure: COLONOSCOPY, WITH  BIOPSY;  Surgeon: Colton Valentin MD;  Location: [facility] OR      TOOTH EXTRACTION W/FORCE         ALLERGIES:   No Known Allergies    PERTINENT MEDICATIONS:    Current Outpatient Medications:      acetaminophen (TYLENOL) 325 MG tablet, Take 325-650 mg by mouth every 6 hours as needed for mild pain, Disp: , Rfl:      Loratadine-Pseudoephedrine (CLARITIN-D 24 HOUR PO), , Disp: , Rfl:      mesalamine (LIALDA) 1.2 g EC tablet, Take 2 tablets (2,400 mg) by mouth 2 times daily for 90 days, THEN 1 tablet (1,200 mg) 2 times daily., Disp: 120 tablet, Rfl: 11     mesalamine (LIALDA) 1.2 G EC tablet, Take 2 tablets (2,400 mg) by mouth every morning (Patient taking differently: Take 1,200 mg by  mouth 2 times daily ), Disp: 180 tablet, Rfl: 3    SOCIAL HISTORY:  Social History     Socioeconomic History     Marital status:      Spouse name: Not on file     Number of children: Not on file     Years of education: Not on file     Highest education level: Not on file   Occupational History     Not on file   Social Needs     Financial resource strain: Not on file     Food insecurity     Worry: Not on file     Inability: Not on file     Transportation needs     Medical: Not on file     Non-medical: Not on file   Tobacco Use     Smoking status: Never Smoker     Smokeless tobacco: Never Used   Substance and Sexual Activity     Alcohol use: Yes     Comment: 1/day, beer or scotch     Drug use: No     Sexual activity: Not on file   Lifestyle     Physical activity     Days per week: Not on file     Minutes per session: Not on file     Stress: Not on file   Relationships     Social connections     Talks on phone: Not on file     Gets together: Not on file     Attends Hoahaoism service: Not on file     Active member of club or organization: Not on file     Attends meetings of clubs or organizations: Not on file     Relationship status: Not on file     Intimate partner violence     Fear of current or ex partner: Not on file     Emotionally abused: Not on file     Physically abused: Not on file     Forced sexual activity: Not on file   Other Topics Concern     Not on file   Social History Narrative     Not on file       FAMILY HISTORY:  Family History   Problem Relation Age of Onset     Lung Cancer Paternal Grandfather         smoker, 55     Colon Cancer No family hx of      Crohn's Disease No family hx of      Irritable Bowel Syndrome No family hx of      Inflammatory Bowel Disease No family hx of      Ulcerative Colitis No family hx of        Past/family/social history reviewed and no changes      Raymond Aguayo is a 40 year old male who is being evaluated via a billable video visit.      The patient has been  "notified of following:     \"This video visit will be conducted via a call between you and your physician/provider. We have found that certain health care needs can be provided without the need for an in-person physical exam.  This service lets us provide the care you need with a video conversation.  If a prescription is necessary we can send it directly to your pharmacy.  If lab work is needed we can place an order for that and you can then stop by our lab to have the test done at a later time.    If during the course of the call the physician/provider feels a video visit is not appropriate, you will not be charged for this service.\"     Patient confirmed that they are in [place] for today's visit yes    If the video visit is dropped, please send link to:   Text to cell phone: [phone]    Video-Visit Details  Type of service:  Video Visit    Video Start Time: 12:59 PM  Video End Time:  1:19 PM    Originating Location (pt. Location): Home    Distant Location (provider location):  [facility]STROENTEROLOG[facility] [place]     Platform used: [employer]              Again, thank you for allowing me to participate in the care of your patient.        Sincerely,        Colton Valentin MD    "

## 2021-01-29 ENCOUNTER — PATIENT OUTREACH (OUTPATIENT)
Dept: GASTROENTEROLOGY | Facility: CLINIC | Age: 41
End: 2021-01-29

## 2021-01-29 DIAGNOSIS — K51.00 ULCERATIVE PANCOLITIS WITHOUT COMPLICATION (H): Primary | ICD-10-CM

## 2021-01-29 NOTE — PROGRESS NOTES
vedo therapy entered   Standing lab orders         Needs vedolizumab     Referral to damian for health care maintenance     Follow up in 3 months       Left a message  to check on where patient would like to infuse

## 2021-02-03 ENCOUNTER — VIRTUAL VISIT (OUTPATIENT)
Dept: PHARMACY | Facility: CLINIC | Age: 41
End: 2021-02-03
Attending: INTERNAL MEDICINE
Payer: COMMERCIAL

## 2021-02-03 DIAGNOSIS — J30.2 SEASONAL ALLERGIC RHINITIS, UNSPECIFIED TRIGGER: ICD-10-CM

## 2021-02-03 DIAGNOSIS — R52 INTERMITTENT PAIN: ICD-10-CM

## 2021-02-03 DIAGNOSIS — K51.00 ULCERATIVE PANCOLITIS WITHOUT COMPLICATION (H): Primary | ICD-10-CM

## 2021-02-03 PROCEDURE — 99607 MTMS BY PHARM ADDL 15 MIN: CPT | Performed by: PHARMACIST

## 2021-02-03 PROCEDURE — 99605 MTMS BY PHARM NP 15 MIN: CPT | Performed by: PHARMACIST

## 2021-02-03 NOTE — PROGRESS NOTES
Medication Therapy Management (MTM) Encounter    ASSESSMENT:                            Medication Adherence/Access: No issues identified    Ulcerative Colitis: Hakan would benefit from escalating therapy as indicated by Dr. Valentin. Reviewed Entyvio in depth as described below. Encouraged him to sign-up for Entyvio Connect in the event he needs this and provided information in AVS. His vaccines are mainly up-to-date. Discussed consideration for Shingrix and implications of off-label recommendation for this. Encouraged him to contact us if he is interested in pursuing as this may require facilitation of an order. This was previously discussed with him by Dr. Win on 9/30. It does appear hepatitis A is a vaccine consideration for him based on planned travel to [place]. If he is unable to confirm this [facility], could consider discussing a titer with his provider to confirm immune status. Would recommend vitamin D 25 mcg daily given latest vitamin D level, and he appears agreeable to this.      Given results of PHQ-2, discussed mental health. He will plan to discuss this with primary care given his interest in continued work-up for adult ADD.     Allergies: Unchanged.    Intermittent Pain: Stable based on report.    PLAN:                            1. Hakan to consider the following vaccines:   --  Shingrix (off-label recommendation)    2. Hakan to consider addition of 25 mcg of vitamin D daily     3. Mirtha to check-into [place]/Hepatitis A vaccination   -- Per the CDC, it does look like travelers who are over 40 (and/or are immunocompromised) should consider vaccination. Per [employer], hepatitis A vaccination is a consideration for those deployed to high risk areas. If he is unable to confirm this with [facility] records, could consider hepatitis A titers.    4. Entyvio connect information in AVS    Follow-up: 1 month after medication start, 1 year for health maintenance review    SUBJECTIVE/OBJECTIVE:                           Raymond Aguayo is a 40 year old male contacted via secure video for an initial visit. He was referred to me from Dr. Valentin.      Reason for visit: Health maintenance review and vedolizumab start    Allergies/ADRs: Reviewed in chart  Tobacco: He reports that he has never smoked. He has never used smokeless tobacco.  Alcohol: 7-9 beverages / week    Medication Adherence/Access: no issues reported    Ulcerative Colitis:   Mesalamine 2.4 g twice daily  Multivitamin daily     Hakan was recently seen by Dr. Valentin for a virtual visit on 1/27/20 and he also had a recent colonoscopy which indicated persistent inflammation in the colon. Determine need to escalate therapy and biologic options were discussed. Joint decision was made to proceed with vedolizumab pending insurance coverage. In April they are going to [place] (wife/two sons), and he is wondering if he needs any additional vaccines because of this (and planning Entyvio). He later notes he actually has four children.    We reviewed vedolizumab today including mechanism of action, general dosing, side effects (both common/serious), precautions and monitoring of both safety and efficacy. Discussed precautions of immunosuppression but provided context in terms of selectivity of vedolizumab. Discussed therapeutic drug monitoring as well as coverage of Entyvio in general. He would appreciate if I could include the EntyvioConnect information in the AVS. Provided time to ask questions and Hakan acknowledges all his questions were answered at the end of our visit today. His Entyvio authorization is still pending at this time, but we discussed that [facility] would likely be reaching out to discuss next steps once we receive a determination. Pre-biologic screening completed on 1/7/21.     Reviewed IBD HCM today as described below. Notes primary care through [place] and  background.     IBD Health Care Maintenance:    Vaccinations:  All patients on  biologics should avoid live vaccines.    -- Influenza (every year) 11/5/2020, up-to-date for 20-21 season  -- TdaP (every 10 years) 8/27/2020  -- Pneumococcal Pneumonia (once plus booster at 5 years)   -Prevnar-13 8/27/20   -Pneumovax-23 11/5/20, booster due in 11/2025  -- Yearly assessment for latent Tb (verbal screening and exam, PPD or QuantiFERON-Tb testing)   Negative 1/7/21    One time confirmation of immunity or serologies:  -- Hepatitis A (serologies or immunizations) unsure, reports two tours in [place] -- [employer]   -- Hepatitis B (serologies or immunizations) serologies indicate immunity 2021  -- Varicella had the chicken pox, consider Shingrix  -- MMR 8/1992    Due to the immunosuppression in this patient, I would not advise administration of live vaccines such as varicella/VZV, intranasal influenza, MMR, or yellow fever vaccine (if traveling).    Bone mineral density screening   -- Recommend all patients supplement with calcium and vitamin D   - acknowledges getting lots of dairy in his diet    Vitamin D Deficiency Screening Results:  Lab Results   Component Value Date    VITDT 14 (L) 01/07/2021      -- Given prior steroid use recommend DEXA if not already done    Cancer Screening:  Colon cancer screening: due in 2022 per Dr. Valentin.    Skin cancer screening: Annual visual exam of skin by dermatologist since patient is immunocompromised    Depression Screening:  -- Over the last month, have you felt down, depressed, or hopeless? A little bit   -- Over the last month, have you felt little interest or pleasure doing things? No     -- plan to see PCP (would also like to address adult ADD)    Misc:  -- Avoid tobacco use  -- Avoid NSAIDs as there is potentially a 25% chance of causing an IBD flare    Allergies:   Claritin-D once daily    Used to think these were seasonal, but not so much anymore. Does find this to be helpful most of the time, but he acknowledges occasional residual symptoms. No reported  concerns.    Intermittent Pain:   Acetaminophen as needed     No reported concerns. He is aware of avoiding NSAIDs (which he used to use frequently).  ----------------    I spent 60 minutes with this patient today. I offer these suggestions for consideration by his care team. A copy of the visit note was provided to the patient's referring provider.    The patient was sent via [employer] a summary of these recommendations.     Mirtha Pike PharmD, BCACP  [place] Pharmacist   [facility] Gastroenterology and Rheumatology  Phone: [phone]    Telemedicine Visit Details  Type of service:  Video Conference via [employer]  Start Time: 1:30 PM  End Time: 2:30 PM  Originating Location (patient location): Home  Distant Location (provider location):  [facility] [place]      Medication Therapy Recommendations  Ulcerative colitis (H)    Rationale: Preventive therapy - Needs additional medication therapy - Indication   Recommendation: Start Medication - Shingrix 50 MCG/0.5ML Susr   Status: Patient Agreed - Adherence/Education   Note: Consider Shingrix vaccine series          Rationale: Untreated condition - Needs additional medication therapy - Indication   Recommendation: Start Medication - Vitamin D (Cholecalciferol) 25 MCG (1000 UT) Caps   Status: Accepted - no CPA Needed   Note: Start vitamin D 1,000 units (25 mcg) by mouth daily

## 2021-02-06 NOTE — PATIENT INSTRUCTIONS
Recommendations from today's MTM visit:                                                    MTM (medication therapy management) is a service provided by a clinical pharmacist designed to help you get the most of out of your medicines.   Today we reviewed what your medicines are for, how to know if they are working, that your medicines are safe and how to make your medicine regimen as easy as possible.      1. Consider the Shingrix vaccine series. As we discussed this is an off-label recommendation given you are less than 50 years old. If you are interested in getting this, please let us know as it may require an order from Dr. Valentin.    2. Consider adding 25 mcg (or 1000 units) of vitamin D daily.    3. Mirtha will look into hepatitis A vaccination (possibly from the ) and whether or not the CDC recommends you get vaccinated prior to travel to [place].    -- update: It does appear this is a recommended vaccine based on CDC recommendations for the area. If you are unable to confirm the status of your hepatitis A vaccine with the [facility], please let me know and we can discuss checking a titer with your provider (either Dr. Valentin or primary care).    4. Here is the information for the Entyvio Connect program which can help you get access to copay assistance (if you qualify):    https://[email or web address]/Poptank Studiosconnect-form?gclid=[email or web address]&gclsrc=aw.ds    It was great to speak with you today.  I value your experience and would be very thankful for your time with providing feedback on our clinic survey. You may receive a survey via email or text message in the next few days.     Next MTM visit: 1 month after medication start for check-in,     To schedule another MTM appointment, please call the clinic directly or you may call the MTM scheduling line at [phone] or toll-free at [phone].     My Clinical Pharmacist's contact information:                                                       It was a pleasure talking with you today!  Please feel free to contact me with any questions or concerns you have.      Mirtha VillalbaD, BCACP  MTM Pharmacist   [facility]astro[facility] Rheumat[facility]  Phone: [phone]     No stage II

## 2021-02-09 ENCOUNTER — CARE COORDINATION (OUTPATIENT)
Dept: GASTROENTEROLOGY | Facility: CLINIC | Age: 41
End: 2021-02-09

## 2021-02-09 DIAGNOSIS — K51.00 ULCERATIVE PANCOLITIS WITHOUT COMPLICATION (H): Primary | ICD-10-CM

## 2021-02-09 NOTE — PROGRESS NOTES
I did have the Peer to Peer Review today.    Discussed rationale for vedolizumab instead of prednisone or azathioprine.    They indicated agreement but stated the insurance company will make the final decision.    Await decision.    Colton Valentin MD

## 2021-02-10 ENCOUNTER — PATIENT OUTREACH (OUTPATIENT)
Dept: GASTROENTEROLOGY | Facility: CLINIC | Age: 41
End: 2021-02-10

## 2021-02-10 RX ORDER — MULTIVITAMIN,THERAPEUTIC
1 TABLET ORAL DAILY
COMMUNITY

## 2021-02-10 NOTE — PROGRESS NOTES
Attempted to reach pt however  mailbox is full   Sent a my chart message.     Peer to peer completed   Prior authorization approved.  recommends moving forward with dosing.

## 2021-02-11 NOTE — PROGRESS NOTES
Mailbox is full  Sent another my chart message  Left a voice mail message on wife's voice mail.

## 2021-02-12 DIAGNOSIS — Z20.822 ENCOUNTER FOR LABORATORY TESTING FOR COVID-19 VIRUS: Primary | ICD-10-CM

## 2021-02-14 DIAGNOSIS — Z20.822 ENCOUNTER FOR LABORATORY TESTING FOR COVID-19 VIRUS: ICD-10-CM

## 2021-02-14 LAB
SARS-COV-2 RNA RESP QL NAA+PROBE: NORMAL
SPECIMEN SOURCE: NORMAL

## 2021-02-14 PROCEDURE — U0003 INFECTIOUS AGENT DETECTION BY NUCLEIC ACID (DNA OR RNA); SEVERE ACUTE RESPIRATORY SYNDROME CORONAVIRUS 2 (SARS-COV-2) (CORONAVIRUS DISEASE [COVID-19]), AMPLIFIED PROBE TECHNIQUE, MAKING USE OF HIGH THROUGHPUT TECHNOLOGIES AS DESCRIBED BY CMS-2020-01-R: HCPCS | Performed by: INTERNAL MEDICINE

## 2021-02-14 PROCEDURE — U0005 INFEC AGEN DETEC AMPLI PROBE: HCPCS | Performed by: INTERNAL MEDICINE

## 2021-02-15 LAB
LABORATORY COMMENT REPORT: NORMAL
SARS-COV-2 RNA RESP QL NAA+PROBE: NEGATIVE
SPECIMEN SOURCE: NORMAL

## 2021-02-17 ENCOUNTER — PATIENT OUTREACH (OUTPATIENT)
Dept: GASTROENTEROLOGY | Facility: CLINIC | Age: 41
End: 2021-02-17

## 2021-02-17 ENCOUNTER — INFUSION THERAPY VISIT (OUTPATIENT)
Dept: INFUSION THERAPY | Facility: CLINIC | Age: 41
End: 2021-02-17
Attending: INTERNAL MEDICINE
Payer: COMMERCIAL

## 2021-02-17 VITALS
OXYGEN SATURATION: 100 % | RESPIRATION RATE: 16 BRPM | HEART RATE: 93 BPM | DIASTOLIC BLOOD PRESSURE: 76 MMHG | SYSTOLIC BLOOD PRESSURE: 115 MMHG

## 2021-02-17 DIAGNOSIS — K51.00 ULCERATIVE PANCOLITIS WITHOUT COMPLICATION (H): Primary | ICD-10-CM

## 2021-02-17 LAB
ALBUMIN SERPL-MCNC: 3.8 G/DL (ref 3.4–5)
ALP SERPL-CCNC: 112 U/L (ref 40–150)
ALT SERPL W P-5'-P-CCNC: 52 U/L (ref 0–70)
AST SERPL W P-5'-P-CCNC: 19 U/L (ref 0–45)
BASOPHILS # BLD AUTO: 0.1 10E9/L (ref 0–0.2)
BASOPHILS NFR BLD AUTO: 1.1 %
BILIRUB DIRECT SERPL-MCNC: <0.1 MG/DL (ref 0–0.2)
BILIRUB SERPL-MCNC: 0.3 MG/DL (ref 0.2–1.3)
CRP SERPL-MCNC: 4 MG/L (ref 0–8)
DIFFERENTIAL METHOD BLD: NORMAL
EOSINOPHIL # BLD AUTO: 0.1 10E9/L (ref 0–0.7)
EOSINOPHIL NFR BLD AUTO: 1.5 %
ERYTHROCYTE [DISTWIDTH] IN BLOOD BY AUTOMATED COUNT: 12.4 % (ref 10–15)
ERYTHROCYTE [SEDIMENTATION RATE] IN BLOOD BY WESTERGREN METHOD: 12 MM/H (ref 0–15)
HCT VFR BLD AUTO: 42.3 % (ref 40–53)
HGB BLD-MCNC: 14.7 G/DL (ref 13.3–17.7)
IMM GRANULOCYTES # BLD: 0 10E9/L (ref 0–0.4)
IMM GRANULOCYTES NFR BLD: 0.2 %
LYMPHOCYTES # BLD AUTO: 1.5 10E9/L (ref 0.8–5.3)
LYMPHOCYTES NFR BLD AUTO: 32.3 %
MCH RBC QN AUTO: 29.3 PG (ref 26.5–33)
MCHC RBC AUTO-ENTMCNC: 34.8 G/DL (ref 31.5–36.5)
MCV RBC AUTO: 84 FL (ref 78–100)
MONOCYTES # BLD AUTO: 0.9 10E9/L (ref 0–1.3)
MONOCYTES NFR BLD AUTO: 19.9 %
NEUTROPHILS # BLD AUTO: 2.1 10E9/L (ref 1.6–8.3)
NEUTROPHILS NFR BLD AUTO: 45 %
NRBC # BLD AUTO: 0 10*3/UL
NRBC BLD AUTO-RTO: 0 /100
PLATELET # BLD AUTO: 333 10E9/L (ref 150–450)
PROT SERPL-MCNC: 7.6 G/DL (ref 6.8–8.8)
RBC # BLD AUTO: 5.01 10E12/L (ref 4.4–5.9)
WBC # BLD AUTO: 4.7 10E9/L (ref 4–11)

## 2021-02-17 PROCEDURE — 85652 RBC SED RATE AUTOMATED: CPT | Performed by: INTERNAL MEDICINE

## 2021-02-17 PROCEDURE — 85025 COMPLETE CBC W/AUTO DIFF WBC: CPT | Performed by: INTERNAL MEDICINE

## 2021-02-17 PROCEDURE — 96365 THER/PROPH/DIAG IV INF INIT: CPT

## 2021-02-17 PROCEDURE — 258N000003 HC RX IP 258 OP 636: Performed by: INTERNAL MEDICINE

## 2021-02-17 PROCEDURE — 250N000011 HC RX IP 250 OP 636: Performed by: INTERNAL MEDICINE

## 2021-02-17 PROCEDURE — 80076 HEPATIC FUNCTION PANEL: CPT | Performed by: INTERNAL MEDICINE

## 2021-02-17 PROCEDURE — 86140 C-REACTIVE PROTEIN: CPT | Performed by: INTERNAL MEDICINE

## 2021-02-17 RX ADMIN — VEDOLIZUMAB 300 MG: 300 INJECTION, POWDER, LYOPHILIZED, FOR SOLUTION INTRAVENOUS at 16:11

## 2021-02-17 ASSESSMENT — PAIN SCALES - GENERAL: PAINLEVEL: NO PAIN (0)

## 2021-02-17 NOTE — PATIENT INSTRUCTIONS
Dear Raymond Aguayo    Thank you for choosing [facility] ([facility]) for your infusion.  The following information is a summary of our appointment as well as important reminders.      Patient Education     Vedolizumab injection solution  Brand Name: Entyvio  What is this medicine?  VEDOLIZUMAB (Ve carmichael VINAY you mab) is used to treat ulcerative colitis and Crohn's disease in adult patients.  How should I use this medicine?  This medicine is for infusion into a vein. It is given by a health care professional in a hospital or clinic setting.  A special MedGuide will be given to you by the pharmacist with each prescription and refill. Be sure to read this information carefully each time.  Talk to your pediatrician regarding the use of this medicine in children. This medicine is not approved for use in children.  What side effects may I notice from receiving this medicine?  Side effects that you should report to your doctor or health care professional as soon as possible:    allergic reactions like skin rash, itching or hives, swelling of the face, lips, or tongue    breathing problems    changes in vision    chest pain    dark urine    depression, feelings of sadness    dizziness    general ill feeling or flu-like symptoms    irregular, missed, or painful menstrual periods    light-colored stools    loss of appetite, nausea    muscle weakness    problems with balance, talking, or walking    right upper belly pain    unusually weak or tired    yellowing of the eyes or skin  Side effects that usually do not require medical attention (report to your doctor or health care professional if they continue or are bothersome):    aches, pains    headache    stomach upset    tiredness  What may interact with this medicine?      steroid medicines like prednisone or cortisone    TNF-alpha inhibitors like natalizumab, adalimumab, and infliximab    vaccines  What if I miss a  dose?  It is important not to miss your dose. Call your doctor or health care professional if you are unable to keep an appointment.  Where should I keep my medicine?  This drug is given in a hospital or clinic and will not be stored at home.  What should I tell my health care provider before I take this medicine?  They need to know if you have any of these conditions:    diabetes    hepatitis B or history of hepatitis B infection    HIV or AIDS    immune system problems    infection or history of infections    liver disease    recently received or scheduled to receive a vaccine    scheduled to have surgery    tuberculosis, a positive skin test for tuberculosis or have recently been in close contact with someone who has tuberculosis    an unusual or allergic reaction to vedolizumab, other medicines, foods, dyes, or preservatives    pregnant or trying to get pregnant    breast-feeding  What should I watch for while using this medicine?  Your condition will be monitored carefully while you are receiving this medicine. Visit your doctor for regular check ups. Tell your doctor or healthcare professional if your symptoms do not start to get better or if they get worse.  Stay away from people who are sick. Call your doctor or health care professional for advice if you get a fever, chills or sore throat, or other symptoms of a cold or flu. Do not treat yourself.  In some patients, this medicine may cause a serious brain infection that may cause death. If you have any problems seeing, thinking, speaking, walking, or standing, tell your doctor right away. If you cannot reach your doctor, get urgent medical care.  NOTE:This sheet is a summary. It may not cover all possible information. If you have questions about this medicine, talk to your doctor, pharmacist, or health care provider. Copyright  2020 [employer]  EDUCATION POST BIOLOGICAL/CHEMOTHERAPY INFUSION  Call the triage nurse at your clinic or seek medical attention if  you have chills and/or temperature greater than or equal to 100.5, uncontrolled nausea/vomiting, diarrhea, constipation, dizziness, shortness of breath, chest pain, heart palpitations, weakness or any other new or concerning symptoms, questions or concerns.  You can not have any live virus vaccines prior to or during treatment or up to 6 months post infusion.  If you have an upcoming surgery, medical procedure or dental procedure during treatment, this should be discussed with your ordering physician and your surgeon/dentist.  If you are having any concerning symptom, if you are unsure if you should get your next infusion or wish to speak to a provider before your next infusion, please call your care coordinator or triage nurse at your clinic to notify them so we can adequately serve you.             We look forward in seeing you on your next appointment here at [facility] ([facility]).  Please don t hesitate to call us at [phone] to reschedule any of your appointments or to speak with one of the [facility] registered nurses.  It was a pleasure taking care of you today.    Sincerely,    [facility]  [facility]  [address] [place] [address]  [address]  Phone:  [phone]

## 2021-02-17 NOTE — PROGRESS NOTES
Patient may receive he 3 loading doses at [facility] then will need to transition.   Patient aware  First dose of vedo today the 17th.

## 2021-02-17 NOTE — PROGRESS NOTES
Nursing Note  Raymond Aguayo presents [facility] for:   Chief Complaint   Patient presents with     Infusion     entyvio     During [facility]'s [facility] appointment, orders from  were completed.  Frequency: week 0, 2, 6 then every 8 today is first dose.     Progress note:  Patient identification verified by name and date of birth.  Assessment completed.  Vitals recorded in Doc Flowsheets.  Patient was provided with education regarding medication/procedure and possible side effects.  Patient verbalized understanding.     present during visit today: Not Applicable.    Treatment Conditions: ~~~ NOTE: If the patient answers yes to any of the questions below, hold the infusion and contact ordering provider or on-call provider.    1. Have you recently had an elevated temperature, fever, chills, productive cough, coughing for 3 weeks or longer or hemoptysis, abnormal vital signs, night sweats,  chest pain or have you noticed a decrease in your appetite, unexplained weight loss or fatigue? No  2. Do you have any open wounds or new incisions? No  3. Do you have any recent or upcoming hospitalizations, surgeries or dental procedures? No  4. Do you currently have or recently have had any signs of illness or infection or are you on any antibiotics? No  5. Have you had any new, sudden or worsening abdominal pain? No  6. Have you or anyone in your household received a live vaccination in the past 4 weeks? Please note:  No live vaccines while on biologic/chemotherapy until 6 months after the last treatment.  Patient can receive the flu vaccine (shot only) and the pneumovax.  It is optimal for the patient to get these vaccines mid cycle, but they can be given at any time as long as it is not on the day of the infusion. No  7. Have you recently been diagnosed with any new nervous system diseases (ie. Multiple sclerosis, Guillain [place], seizures,  neurological changes) or cancer diagnosis? No  8. Are you on any form of radiation or chemotherapy? No  9. Are you pregnant or breast feeding or do you have plans of pregnancy in the future? N/A  10. Have you been having any signs of worsening depression or suicidal ideations?  (benlysta only) N/A  11. Have there been any other new onset medical symptoms? No  Premedications: were not ordered.  Drug Waste Record: No  Infusion length and rate:  infusion given over approximately 30 minutes  Labs: were drawn per orders.   Vascular access: peripheral IV placed today.    Is the next appt scheduled? Yes  Asymptomatic COVID test completed? No, last COVID test 2/14/21    Nurse report given to Wen Kumar at 1640. Care transferred at this time.     Post Infusion Assessment:  Patient tolerated infusion without incident.  Site patent and intact, free from redness, edema or discomfort.  No evidence of extravasations.  Access discontinued per protocol.     Discharge Plan:   Follow up plan of care with: ongoing infusions at [facility].  Discharge instructions were reviewed with patient.  Patient/representative verbalized understanding of discharge instructions and all questions answered.  Patient discharged from [facility] in stable condition.    Noemi Castanon, STEWART       Administrations This Visit     vedolizumab (ENTYVIO) 300 mg in sodium chloride 0.9 % 280 mL infusion     Admin Date  02/17/2021 Action  New Bag Dose  300 mg Rate  560 mL/hr Route  Intravenous Administered By  Noemi Castanon, RN                  /74   Pulse 83   Resp 16   SpO2 100%

## 2021-02-17 NOTE — LETTER
2/17/2021         RE: Raymond Aguayo  [address] [place]  [facility] [address]        Dear Colleague,    Thank you for referring your patient, Raymond Aguayo, to the [facility]. Please see a copy of my visit note below.    Nursing Note  Raymond Aguayo [facility] for:   Chief Complaint   Patient presents with     Infusion     entyvio     During [facility]'s [facility] appointment, orders from  were completed.  Frequency: week 0, 2, 6 then every 8 today is first dose.     Progress note:  Patient identification verified by name and date of birth.  Assessment completed.  Vitals recorded in Doc Flowsheets.  Patient was provided with education regarding medication/procedure and possible side effects.  Patient verbalized understanding.     present during visit today: Not Applicable.    Treatment Conditions: ~~~ NOTE: If the patient answers yes to any of the questions below, hold the infusion and contact ordering provider or on-call provider.    1. Have you recently had an elevated temperature, fever, chills, productive cough, coughing for 3 weeks or longer or hemoptysis, abnormal vital signs, night sweats,  chest pain or have you noticed a decrease in your appetite, unexplained weight loss or fatigue? No  2. Do you have any open wounds or new incisions? No  3. Do you have any recent or upcoming hospitalizations, surgeries or dental procedures? No  4. Do you currently have or recently have had any signs of illness or infection or are you on any antibiotics? No  5. Have you had any new, sudden or worsening abdominal pain? No  6. Have you or anyone in your household received a live vaccination in the past 4 weeks? Please note:  No live vaccines while on biologic/chemotherapy until 6 months after the last treatment.  Patient can receive the flu vaccine (shot only) and the pneumovax.  It is  optimal for the patient to get these vaccines mid cycle, but they can be given at any time as long as it is not on the day of the infusion. No  7. Have you recently been diagnosed with any new nervous system diseases (ie. Multiple sclerosis, Guillain [place], seizures, neurological changes) or cancer diagnosis? No  8. Are you on any form of radiation or chemotherapy? No  9. Are you pregnant or breast feeding or do you have plans of pregnancy in the future? N/A  10. Have you been having any signs of worsening depression or suicidal ideations?  (benlysta only) N/A  11. Have there been any other new onset medical symptoms? No  Premedications: were not ordered.  Drug Waste Record: No  Infusion length and rate:  infusion given over approximately 30 minutes  Labs: were drawn per orders.   Vascular access: peripheral IV placed today.    Is the next appt scheduled? Yes  Asymptomatic COVID test completed? No, last COVID test 2/14/21    Nurse report given to Wen Kumar at 1640. Care transferred at this time.     Post Infusion Assessment:  Patient tolerated infusion without incident.  Site patent and intact, free from redness, edema or discomfort.  No evidence of extravasations.  Access discontinued per protocol.     Discharge Plan:   Follow up plan of care with: ongoing infusions at [facility].  Discharge instructions were reviewed with patient.  Patient/representative verbalized understanding of discharge instructions and all questions answered.  Patient discharged from [facility] in stable condition.    Noemi Castanon RN       Administrations This Visit     vedolizumab (ENTYVIO) 300 mg in sodium chloride 0.9 % 280 mL infusion     Admin Date  02/17/2021 Action  New Bag Dose  300 mg Rate  560 mL/hr Route  Intravenous Administered By  Noemi Castanon RN                  /74   Pulse 83   Resp 16   SpO2 100%         Again, thank you for allowing me to  participate in the care of your patient.        Sincerely,        [facility]

## 2021-03-03 ENCOUNTER — INFUSION THERAPY VISIT (OUTPATIENT)
Dept: INFUSION THERAPY | Facility: CLINIC | Age: 41
End: 2021-03-03
Payer: COMMERCIAL

## 2021-03-03 VITALS
TEMPERATURE: 98.4 F | HEART RATE: 83 BPM | WEIGHT: 227.7 LBS | RESPIRATION RATE: 16 BRPM | SYSTOLIC BLOOD PRESSURE: 107 MMHG | DIASTOLIC BLOOD PRESSURE: 67 MMHG | BODY MASS INDEX: 30.88 KG/M2 | OXYGEN SATURATION: 96 %

## 2021-03-03 DIAGNOSIS — K51.00 ULCERATIVE PANCOLITIS WITHOUT COMPLICATION (H): Primary | ICD-10-CM

## 2021-03-03 LAB
ALBUMIN SERPL-MCNC: 3.8 G/DL (ref 3.4–5)
ALP SERPL-CCNC: 116 U/L (ref 40–150)
ALT SERPL W P-5'-P-CCNC: 64 U/L (ref 0–70)
AST SERPL W P-5'-P-CCNC: 30 U/L (ref 0–45)
BASOPHILS # BLD AUTO: 0.1 10E9/L (ref 0–0.2)
BASOPHILS NFR BLD AUTO: 0.8 %
BILIRUB DIRECT SERPL-MCNC: <0.1 MG/DL (ref 0–0.2)
BILIRUB SERPL-MCNC: 0.3 MG/DL (ref 0.2–1.3)
CRP SERPL-MCNC: 3.4 MG/L (ref 0–8)
DIFFERENTIAL METHOD BLD: NORMAL
EOSINOPHIL # BLD AUTO: 0.2 10E9/L (ref 0–0.7)
EOSINOPHIL NFR BLD AUTO: 2.3 %
ERYTHROCYTE [DISTWIDTH] IN BLOOD BY AUTOMATED COUNT: 12.3 % (ref 10–15)
ERYTHROCYTE [SEDIMENTATION RATE] IN BLOOD BY WESTERGREN METHOD: 10 MM/H (ref 0–15)
HCT VFR BLD AUTO: 42.5 % (ref 40–53)
HGB BLD-MCNC: 14.5 G/DL (ref 13.3–17.7)
IMM GRANULOCYTES # BLD: 0 10E9/L (ref 0–0.4)
IMM GRANULOCYTES NFR BLD: 0.3 %
LYMPHOCYTES # BLD AUTO: 1.5 10E9/L (ref 0.8–5.3)
LYMPHOCYTES NFR BLD AUTO: 23.5 %
MCH RBC QN AUTO: 29.5 PG (ref 26.5–33)
MCHC RBC AUTO-ENTMCNC: 34.1 G/DL (ref 31.5–36.5)
MCV RBC AUTO: 86 FL (ref 78–100)
MONOCYTES # BLD AUTO: 0.6 10E9/L (ref 0–1.3)
MONOCYTES NFR BLD AUTO: 9.6 %
NEUTROPHILS # BLD AUTO: 4.1 10E9/L (ref 1.6–8.3)
NEUTROPHILS NFR BLD AUTO: 63.5 %
NRBC # BLD AUTO: 0 10*3/UL
NRBC BLD AUTO-RTO: 0 /100
PLATELET # BLD AUTO: 295 10E9/L (ref 150–450)
PROT SERPL-MCNC: 7.7 G/DL (ref 6.8–8.8)
RBC # BLD AUTO: 4.92 10E12/L (ref 4.4–5.9)
WBC # BLD AUTO: 6.5 10E9/L (ref 4–11)

## 2021-03-03 PROCEDURE — 85025 COMPLETE CBC W/AUTO DIFF WBC: CPT | Performed by: INTERNAL MEDICINE

## 2021-03-03 PROCEDURE — 86140 C-REACTIVE PROTEIN: CPT | Performed by: INTERNAL MEDICINE

## 2021-03-03 PROCEDURE — 85652 RBC SED RATE AUTOMATED: CPT | Performed by: INTERNAL MEDICINE

## 2021-03-03 PROCEDURE — 96365 THER/PROPH/DIAG IV INF INIT: CPT

## 2021-03-03 PROCEDURE — 86708 HEPATITIS A ANTIBODY: CPT | Performed by: INTERNAL MEDICINE

## 2021-03-03 PROCEDURE — 250N000011 HC RX IP 250 OP 636: Performed by: INTERNAL MEDICINE

## 2021-03-03 PROCEDURE — 80076 HEPATIC FUNCTION PANEL: CPT | Performed by: INTERNAL MEDICINE

## 2021-03-03 PROCEDURE — 258N000003 HC RX IP 258 OP 636: Performed by: INTERNAL MEDICINE

## 2021-03-03 RX ADMIN — VEDOLIZUMAB 300 MG: 300 INJECTION, POWDER, LYOPHILIZED, FOR SOLUTION INTRAVENOUS at 15:38

## 2021-03-03 ASSESSMENT — PAIN SCALES - GENERAL: PAINLEVEL: NO PAIN (0)

## 2021-03-03 NOTE — PROGRESS NOTES
Nursing Note  Raymondpatti Aguayo presents [facility] for:   Chief Complaint   Patient presents with     Infusion     Entyvio     During [facility]'s [facility] appointment, orders from Dr. Valentin were completed.  Frequency: today is dose 2 of 2, initiation - then every 8 weeks    Progress note:  Patient identification verified by name and date of birth.  Assessment completed.  Vitals recorded in Doc Flowsheets.  Patient was provided with education regarding medication/procedure and possible side effects.  Patient verbalized understanding.     present during visit today: Not Applicable.    Treatment Conditions: ~~~ NOTE: If the patient answers yes to any of the questions below, hold the infusion and contact ordering provider or on-call provider.    1. Have you recently had an elevated temperature, fever, chills, productive cough, coughing for 3 weeks or longer or hemoptysis, abnormal vital signs, night sweats,  chest pain or have you noticed a decrease in your appetite, unexplained weight loss or fatigue? No  2. Do you have any open wounds or new incisions? No  3. Do you have any recent or upcoming hospitalizations, surgeries or dental procedures? No  4. Do you currently have or recently have had any signs of illness or infection or are you on any antibiotics? No  5. Have you had any new, sudden or worsening abdominal pain? No  6. Have you or anyone in your household received a live vaccination in the past 4 weeks? Please note:  No live vaccines while on biologic/chemotherapy until 6 months after the last treatment.  Patient can receive the flu vaccine (shot only) and the pneumovax.  It is optimal for the patient to get these vaccines mid cycle, but they can be given at any time as long as it is not on the day of the infusion. No  7. Have you recently been diagnosed with any new nervous system diseases (ie. Multiple sclerosis, Guillain [place],  seizures, neurological changes) or cancer diagnosis? No  8. Are you on any form of radiation or chemotherapy? No  9. Are you pregnant or breast feeding or do you have plans of pregnancy in the future? No  10. Have you been having any signs of worsening depression or suicidal ideations?  (benlysta only) NA  11. Have there been any other new onset medical symptoms? No      Premedications: were not ordered.    Drug Waste Record: No    Infusion length and rate:  infusion given over approximately 30 minutes    Labs: were drawn per orders.     Vascular access: peripheral IV placed today.    Is the next appt scheduled? No - scheduling messaged, patient will contact them - number given to patient  Asymptomatic COVID test completed? No    Post Infusion Assessment:  Patient tolerated infusion without incident.  Site patent and intact, free from redness, edema or discomfort.  No evidence of extravasations.  Access discontinued per protocol.     Discharge Plan:   Follow up plan of care with: ongoing infusions at [facility].  Discharge instructions were reviewed with patient.  Patient/representative verbalized understanding of discharge instructions and all questions answered.  Patient discharged [facility] in stable condition.    Administrations This Visit     vedolizumab (ENTYVIO) 300 mg in sodium chloride 0.9 % 280 mL infusion     Admin Date  03/03/2021 Action  New Bag Dose  300 mg Rate  560 mL/hr Route  Intravenous Administered By  Glendy Ford RN Hope Balcos, RN        /67   Pulse 83   Temp 98.4  F (36.9  C) (Oral)   Resp 16   Wt 103.3 kg (227 lb 11.2 oz)   SpO2 96%   BMI 30.88 kg/m

## 2021-03-04 LAB — HAV IGG SER QL IA: REACTIVE

## 2021-04-14 ENCOUNTER — INFUSION THERAPY VISIT (OUTPATIENT)
Dept: INFUSION THERAPY | Facility: CLINIC | Age: 41
End: 2021-04-14
Attending: INTERNAL MEDICINE
Payer: COMMERCIAL

## 2021-04-14 VITALS
HEART RATE: 103 BPM | WEIGHT: 227.8 LBS | OXYGEN SATURATION: 100 % | TEMPERATURE: 97.9 F | SYSTOLIC BLOOD PRESSURE: 118 MMHG | RESPIRATION RATE: 16 BRPM | DIASTOLIC BLOOD PRESSURE: 78 MMHG | BODY MASS INDEX: 30.9 KG/M2

## 2021-04-14 DIAGNOSIS — K51.00 ULCERATIVE PANCOLITIS WITHOUT COMPLICATION (H): Primary | ICD-10-CM

## 2021-04-14 LAB
ALBUMIN SERPL-MCNC: 3.9 G/DL (ref 3.4–5)
ALP SERPL-CCNC: 84 U/L (ref 40–150)
ALT SERPL W P-5'-P-CCNC: 50 U/L (ref 0–70)
AST SERPL W P-5'-P-CCNC: 20 U/L (ref 0–45)
BASOPHILS # BLD AUTO: 0.1 10E9/L (ref 0–0.2)
BASOPHILS NFR BLD AUTO: 0.9 %
BILIRUB DIRECT SERPL-MCNC: <0.1 MG/DL (ref 0–0.2)
BILIRUB SERPL-MCNC: 0.4 MG/DL (ref 0.2–1.3)
CRP SERPL-MCNC: <2.9 MG/L (ref 0–8)
DIFFERENTIAL METHOD BLD: NORMAL
EOSINOPHIL # BLD AUTO: 0.1 10E9/L (ref 0–0.7)
EOSINOPHIL NFR BLD AUTO: 1.9 %
ERYTHROCYTE [DISTWIDTH] IN BLOOD BY AUTOMATED COUNT: 11.6 % (ref 10–15)
ERYTHROCYTE [SEDIMENTATION RATE] IN BLOOD BY WESTERGREN METHOD: 7 MM/H (ref 0–15)
HCT VFR BLD AUTO: 44 % (ref 40–53)
HGB BLD-MCNC: 15 G/DL (ref 13.3–17.7)
IMM GRANULOCYTES # BLD: 0 10E9/L (ref 0–0.4)
IMM GRANULOCYTES NFR BLD: 0.3 %
LYMPHOCYTES # BLD AUTO: 1.6 10E9/L (ref 0.8–5.3)
LYMPHOCYTES NFR BLD AUTO: 25.8 %
MCH RBC QN AUTO: 29.4 PG (ref 26.5–33)
MCHC RBC AUTO-ENTMCNC: 34.1 G/DL (ref 31.5–36.5)
MCV RBC AUTO: 86 FL (ref 78–100)
MONOCYTES # BLD AUTO: 0.6 10E9/L (ref 0–1.3)
MONOCYTES NFR BLD AUTO: 10 %
NEUTROPHILS # BLD AUTO: 3.9 10E9/L (ref 1.6–8.3)
NEUTROPHILS NFR BLD AUTO: 61.1 %
NRBC # BLD AUTO: 0 10*3/UL
NRBC BLD AUTO-RTO: 0 /100
PLATELET # BLD AUTO: 279 10E9/L (ref 150–450)
PROT SERPL-MCNC: 7.3 G/DL (ref 6.8–8.8)
RBC # BLD AUTO: 5.1 10E12/L (ref 4.4–5.9)
WBC # BLD AUTO: 6.3 10E9/L (ref 4–11)

## 2021-04-14 PROCEDURE — 258N000003 HC RX IP 258 OP 636: Performed by: INTERNAL MEDICINE

## 2021-04-14 PROCEDURE — 96365 THER/PROPH/DIAG IV INF INIT: CPT

## 2021-04-14 PROCEDURE — 85025 COMPLETE CBC W/AUTO DIFF WBC: CPT | Performed by: INTERNAL MEDICINE

## 2021-04-14 PROCEDURE — 85652 RBC SED RATE AUTOMATED: CPT | Performed by: INTERNAL MEDICINE

## 2021-04-14 PROCEDURE — 86140 C-REACTIVE PROTEIN: CPT | Performed by: INTERNAL MEDICINE

## 2021-04-14 PROCEDURE — 250N000011 HC RX IP 250 OP 636: Performed by: INTERNAL MEDICINE

## 2021-04-14 PROCEDURE — 80076 HEPATIC FUNCTION PANEL: CPT | Performed by: INTERNAL MEDICINE

## 2021-04-14 RX ADMIN — VEDOLIZUMAB 300 MG: 300 INJECTION, POWDER, LYOPHILIZED, FOR SOLUTION INTRAVENOUS at 15:34

## 2021-04-14 NOTE — PROGRESS NOTES
Nursing Note  Raymond R Aguayo presents [facility] for:   Chief Complaint   Patient presents with     Infusion     Entyvio     During [facility]'s [facility] appointment, orders from Dr. Valentin were completed.  Frequency: today is dose # 3, moving forward every 8 weeks    Progress note:  Patient identification verified by name and date of birth.  Assessment completed.  Vitals recorded in Doc Flowsheets.  Patient was provided with education regarding medication/procedure and possible side effects.  Patient verbalized understanding.     present during visit today: Not Applicable.    Treatment Conditions: ~~~ NOTE: If the patient answers yes to any of the questions below, hold the infusion and contact ordering provider or on-call provider.    1. Have you recently had an elevated temperature, fever, chills, productive cough, coughing for 3 weeks or longer or hemoptysis, abnormal vital signs, night sweats,  chest pain or have you noticed a decrease in your appetite, unexplained weight loss or fatigue? No  2. Do you have any open wounds or new incisions? No  3. Do you have any recent or upcoming hospitalizations, surgeries or dental procedures? No  4. Do you currently have or recently have had any signs of illness or infection or are you on any antibiotics? No  5. Have you had any new, sudden or worsening abdominal pain? No  6. Have you or anyone in your household received a live vaccination in the past 4 weeks? Please note:  No live vaccines while on biologic/chemotherapy until 6 months after the last treatment.  Patient can receive the flu vaccine (shot only) and the pneumovax.  It is optimal for the patient to get these vaccines mid cycle, but they can be given at any time as long as it is not on the day of the infusion. No  7. Have you recently been diagnosed with any new nervous system diseases (ie. Multiple sclerosis, Guillain [place], seizures,  neurological changes) or cancer diagnosis? No  8. Are you on any form of radiation or chemotherapy? No  9. Are you pregnant or breast feeding or do you have plans of pregnancy in the future? No  10. Have you been having any signs of worsening depression or suicidal ideations?  (benlysta only) No  11. Have there been any other new onset medical symptoms? No      Premedications: were not ordered.    Drug Waste Record: No    Infusion length and rate:  infusion given over approximately 30 minutes    Labs: were drawn per orders.     Vascular access: peripheral IV placed today.    Is the next appt scheduled? No,  messaged  Asymptomatic COVID test completed? no    Post Infusion Assessment:  Patient tolerated infusion without incident.     Discharge Plan:   Follow up plan of care with: ongoing infusions at [facility]. and ordering provider as scheduled.  Discharge instructions were reviewed with patient.  Patient/representative verbalized understanding of discharge instructions and all questions answered.  Patient discharged from [facility] in stable condition.    Sidra Lamar RN       Administrations This Visit     vedolizumab (ENTYVIO) 300 mg in sodium chloride 0.9 % 280 mL infusion     Admin Date  04/14/2021 Action  New Bag Dose  300 mg Rate  560 mL/hr Route  Intravenous Administered By  Sidra Lamar, STEWART                /78   Pulse 103   Temp 97.9  F (36.6  C) (Oral)   Resp 16   Wt 103.3 kg (227 lb 12.8 oz)   SpO2 100%   BMI 30.90 kg/m

## 2021-04-14 NOTE — LETTER
4/14/2021         RE: Raymond Aguayo  [address]  Formerly named [facility] [address]        Dear Colleague,    Thank you for referring your patient, Raymond Aguayo, to the [facility]. Please see a copy of my visit note below.    Nursing Note  Raymond Aguayo [facility] for:   Chief Complaint   Patient presents with     Infusion     Entyvio     During today's [facility] appointment, orders from Dr. Valentin were completed.  Frequency: today is dose # 3, moving forward every 8 weeks    Progress note:  Patient identification verified by name and date of birth.  Assessment completed.  Vitals recorded in Doc Flowsheets.  Patient was provided with education regarding medication/procedure and possible side effects.  Patient verbalized understanding.     present during visit today: Not Applicable.    Treatment Conditions: ~~~ NOTE: If the patient answers yes to any of the questions below, hold the infusion and contact ordering provider or on-call provider.    1. Have you recently had an elevated temperature, fever, chills, productive cough, coughing for 3 weeks or longer or hemoptysis, abnormal vital signs, night sweats,  chest pain or have you noticed a decrease in your appetite, unexplained weight loss or fatigue? No  2. Do you have any open wounds or new incisions? No  3. Do you have any recent or upcoming hospitalizations, surgeries or dental procedures? No  4. Do you currently have or recently have had any signs of illness or infection or are you on any antibiotics? No  5. Have you had any new, sudden or worsening abdominal pain? No  6. Have you or anyone in your household received a live vaccination in the past 4 weeks? Please note:  No live vaccines while on biologic/chemotherapy until 6 months after the last treatment.  Patient can receive the flu vaccine (shot only) and the pneumovax.  It is  optimal for the patient to get these vaccines mid cycle, but they can be given at any time as long as it is not on the day of the infusion. No  7. Have you recently been diagnosed with any new nervous system diseases (ie. Multiple sclerosis, Guillain Printer, seizures, neurological changes) or cancer diagnosis? No  8. Are you on any form of radiation or chemotherapy? No  9. Are you pregnant or breast feeding or do you have plans of pregnancy in the future? No  10. Have you been having any signs of worsening depression or suicidal ideations?  (benlysta only) No  11. Have there been any other new onset medical symptoms? No      Premedications: were not ordered.    Drug Waste Record: No    Infusion length and rate:  infusion given over approximately 30 minutes    Labs: were drawn per orders.     Vascular access: peripheral IV placed today.    Is the next appt scheduled? No,  messaged  Asymptomatic COVID test completed? no    Post Infusion Assessment:  Patient tolerated infusion without incident.     Discharge Plan:   Follow up plan of care with: ongoing infusions at [facility]. and ordering provider as scheduled.  Discharge instructions were reviewed with patient.  Patient/representative verbalized understanding of discharge instructions and all questions answered.  Patient discharged from [facility] in stable condition.    Sidra Lamar RN       Administrations This Visit     vedolizumab (ENTYVIO) 300 mg in sodium chloride 0.9 % 280 mL infusion     Admin Date  04/14/2021 Action  New Bag Dose  300 mg Rate  560 mL/hr Route  Intravenous Administered By  Sdira Laamr, STEWART                /78   Pulse 103   Temp 97.9  F (36.6  C) (Oral)   Resp 16   Wt 103.3 kg (227 lb 12.8 oz)   SpO2 100%   BMI 30.90 kg/m            Again, thank you for allowing me to participate in the care of your patient.        Sincerely,        [facility]

## 2021-04-15 ENCOUNTER — PATIENT OUTREACH (OUTPATIENT)
Dept: GASTROENTEROLOGY | Facility: CLINIC | Age: 41
End: 2021-04-15

## 2021-04-15 NOTE — CONFIDENTIAL NOTE
wanted to check in and see if you had made any decisions about moving the patient to [facility]?       If the patient is appropriate, I can let insurance know and send over the prior authorization to home infusion. I can also let [facility] know to begin on boarding the patient.     If the patient is not appropriate I would just need a letter of medical necessity to provide.

## 2021-04-16 NOTE — CONFIDENTIAL NOTE
Left a message with Analia in prior okay to transition to [facility].     Patient in agreement for transitioning to infusions in his office  Discussed follow up and patient busy until May  Scheduled follow up   States he is under some stress now due to semester  Otherwise doing well

## 2021-04-30 ENCOUNTER — TELEPHONE (OUTPATIENT)
Dept: PHARMACY | Facility: CLINIC | Age: 41
End: 2021-04-30

## 2021-04-30 NOTE — TELEPHONE ENCOUNTER
[facility] PRIOR AUTHORIZATION REQUEST     Drug including DOSE: Entyvio 300 mg every 8 weeks  J Code:, , 21081 and 70258  NDC: [address]-41  ICD 10 code: K51.90    Date(s) of Service: 4/30/21    Insurance Name: [employer]  Insurance ID:[id number]    Provider:SIENA ECHEVARRIA MD  Provider NPI: [id number]      [facility]  NPI: [id number]

## 2021-04-30 NOTE — TELEPHONE ENCOUNTER
Prior Authorization Approval    Authorization Effective Date: 4/30/2021  Authorization Expiration Date: 10/26/2021  Medication: Entyivo 300MG [id number]         Approved Dose/Quantity: 300 mg every 8 weeks  Reference #: [id number]   Which Pharmacy is filling the prescription (Not needed for infusion/clinic administered): [facility]  Pharmacy Notified: Yes

## 2021-05-10 ENCOUNTER — HOME INFUSION (PRE-WILLOW HOME INFUSION) (OUTPATIENT)
Dept: PHARMACY | Facility: CLINIC | Age: 41
End: 2021-05-10

## 2021-05-14 ENCOUNTER — CARE COORDINATION (OUTPATIENT)
Dept: PHARMACY | Facility: CLINIC | Age: 41
End: 2021-05-14

## 2021-05-14 ENCOUNTER — HOME INFUSION (PRE-WILLOW HOME INFUSION) (OUTPATIENT)
Dept: PHARMACY | Facility: CLINIC | Age: 41
End: 2021-05-14

## 2021-05-14 NOTE — PROGRESS NOTES
Referred to [facility]    Raymond Aguayo, 1980  Medication (name, frequency and route):  Entyvio every 8 weeks   Start of Care Date: 6/9/2021 (Confirmed with patient)  Infusion location: Patient Home  Skilled Nursing will be provided by: [facility]  @ [phone]    Luda Baldwin RN

## 2021-05-17 NOTE — PROGRESS NOTES
This is a recent snapshot of the patient's [facility] medical record.  For current drug dose and complete information and questions, call [phone] or In Basket pool, [facility] ([id number])  CSN Number:  [id number]

## 2021-05-17 NOTE — PROGRESS NOTES
This is a recent snapshot of the patient's [facility] medical record.  For current drug dose and complete information and questions, call [phone] or In Basket pool, [facility] home infusion ([id number])  CSN Number:  [id number]

## 2021-05-19 ENCOUNTER — VIRTUAL VISIT (OUTPATIENT)
Dept: GASTROENTEROLOGY | Facility: CLINIC | Age: 41
End: 2021-05-19
Payer: COMMERCIAL

## 2021-05-19 VITALS — WEIGHT: 227 LBS | BODY MASS INDEX: 30.75 KG/M2 | HEIGHT: 72 IN

## 2021-05-19 DIAGNOSIS — K51.00 ULCERATIVE PANCOLITIS WITHOUT COMPLICATION (H): Primary | ICD-10-CM

## 2021-05-19 PROCEDURE — 99213 OFFICE O/P EST LOW 20 MIN: CPT | Mod: 95 | Performed by: INTERNAL MEDICINE

## 2021-05-19 ASSESSMENT — PAIN SCALES - GENERAL: PAINLEVEL: NO PAIN (0)

## 2021-05-19 ASSESSMENT — MIFFLIN-ST. JEOR: SCORE: 1977.67

## 2021-05-19 NOTE — NURSING NOTE
Chief Complaint   Patient presents with     RECHECK     Virtual follow up       Vitals:    05/19/21 1530   Weight: 103 kg (227 lb)   Height: 1.829 m (6')       Body mass index is 30.79 kg/m .      TAURUS Weir NREMT

## 2021-05-19 NOTE — PATIENT INSTRUCTIONS
It was very nice to meet with you at the time of your virtual visit.  Am so happy that things are going so well.    Please continue the vedolizumab infusions.    Please continue the Lialda 4 pills daily.    We will plan on a flexible sigmoidoscopy in 5 to 6 months.  If things are looking good we will likely stop the Lialda and continue on the vedolizumab alone.    Follow-up in clinic in 6 months.    Call with questions.

## 2021-05-19 NOTE — LETTER
"    5/19/2021         RE: Raymond Aguayo  [address] [facility] [address]        Dear Colleague,    Thank you for referring your patient, Raymond Aguayo, to the [facility] [place]. Please see a copy of my visit note below.    Raymond Aguayo is a 40 year old male who is being evaluated via a billable video visit.      The patient has been notified of following:     \"This video visit will be conducted via a call between you and your physician/provider. We have found that certain health care needs can be provided without the need for an in-person physical exam.  This service lets us provide the care you need with a video conversation.  If a prescription is necessary we can send it directly to your pharmacy.  If lab work is needed we can place an order for that and you can then stop by our lab to have the test done at a later time.    If during the course of the call the physician/provider feels a video visit is not appropriate, you will not be charged for this service.\"     Patient confirmed that they are in [place] for today's visit Yes    Video-Visit Details  Type of service:  Video Visit    Video Start Time: 3:51 PM  Video End Time:  3:59 PM    Originating Location (pt. Location): Home    Distant Location (provider location):  [facility] [place]     Platform used: [employer]      IBD CLINIC VISIT     CC/REFERRING MD:  Referred Self  REASON FOR CONSULTATION: Follow-up ulcerative colitis    ASSESSMENT/PLAN    1. Ulcerative colitis:   Pan ulcerative colitis that was refractory to mesalamine alone.  He is now doing well status post 3 infusions of vedolizumab.  He is in symptomatic remission.  He will continue on the vedolizumab as well as the Lialda for now.  We will check a flexible sigmoidoscopy in 5 months.  If he has mucosal healing we will likely taper off the Lialda and continue the vedolizumab alone.  He will follow-up in clinic with me 1 " month after his flexible sigmoidoscopy (6 months from now)    2. IBD dysplasia surveillance:   Due in 2022    3.  Healthcare maintenance-she will continue to follow-up with our healthcare maintenance pharmacy team.    Return to clinic in 6 months    Thank you for this consultation.  It was a pleasure to participate in the care of this patient; please contact us with any further questions.      This note was created with voice recognition software, and while reviewed for accuracy, typos may remain.     Colton Valentin MD  Inflammatory Bowel Disease Program   Division of Gastroenterology, Hepatology and Nutrition  [facility]  Pager: [phone]      IBD HISTORY  Age at diagnosis: [age]  Extent of disease: colon  Current UC medications:   -vedolizumab q 8 weeks  -Lialda 4.8 gm/day  Prior UC surgeries: none  Prior IBD Medications: none     Most recent colonoscopy: 1/4/21:  Impression:          - Preparation of the colon was fair.                        - The examined portion of the ileum was normal.                        - Mild (Frausto Score 1) pancolitis ulcerative colitis.                        Biopsied.                        - The rectum is normal. Biopsied.                        - The examination was otherwise normal on direct and                        retroflexion views.                        - Consistent with mild pan-ulcerative colitis. Given                        rectal sparing Crohn's colitis can [place] considered but                        this is felt to be less likely. It is more likely that                        he has patchy healing of his UC on therapy.         Colon path:  A. CECUM AND ASCENDING COLON, BIOPSIES:   - Moderately active chronic colitis with cryptitis and crypt abscesses   (Analia grade 3)   - Negative for dysplasia     B. TRANSVERSE COLON, BIOPSIES:   - Moderately active chronic colitis with cryptitis and crypt abscesses   (Analia grade 3)   - Negative for dysplasia     C.  DESCENDING AND SIGMOID COLON, BIOPSIES:   - Moderately active chronic colitis with cryptitis and crypt abscesses   (Analia grade 3)   - Negative for dysplasia     D. RECTUM, BIOPSIES:   - Mild focal active chronic colitis with rare cryptitis (Analia grade 2)   - Negative for dysplasia     DRUG MONITORING  TPMT enzyme activity: 24 (normal)    6-TGN/6-MMPN levels: --    Biologic concentration: --    HPI:   Currently, Hakan is here today to follow-up after recently starting vedolizumab for his panulcerative colitis.  He reports things are going quite well.  He is having 2-3 bowel movements per day which is his normal.  He is not having any blood in his stools.  He is having no side effects from the vedolizumab.  He is having no extraintestinal manifestations of IBD.  He is taking his Lialda daily    Frausto score:   Stool freq: 0 (baseline stools frequency)  Rectal bleedin (None)  PGA: 0 (normal)  Endoscopy: Not done    Extra intestinal manifestations of IBD:  No uveitis/episcleritis  No aphthous ulcers   No arthritis   No erythema nodosum/pyoderma gangrenosum.     sIBDQ:  No flowsheet data found.         ROS:    Constitutional, HEENT, cardiovascular, pulmonary, GI, , musculoskeletal, neuro, skin, endocrine and psych systems are negative, except as otherwise noted.    PHYSICAL EXAMINATION:  Constitutional: aaox3, cooperative, pleasant, not dyspneic/diaphoretic, no acute distress  Vitals reviewed: Ht 1.829 m (6')   Wt 103 kg (227 lb)   BMI 30.79 kg/m    Wt:   Wt Readings from Last 2 Encounters:   21 103 kg (227 lb)   21 103.3 kg (227 lb 12.8 oz)      Eyes: Sclera anicteric/injected  Respiratory: Unlabored breathing  Psych: Normal affect    PERTINENT PAST MEDICAL HISTORY:  Past Medical History:   Diagnosis Date     Ulcerative colitis (H) 2015     Wrist fracture, right     minor, sports       PREVIOUS SURGERIES:  Past Surgical History:   Procedure Laterality Date     COLONOSCOPY  2015     COLONOSCOPY  N/A 1/4/2021    Procedure: COLONOSCOPY, WITH  BIOPSY;  Surgeon: Colton Valentin MD;  [place] [facility] [place]      TOOTH EXTRACTION W/FORCEP 2009       ALLERGIES:   No Known Allergies    PERTINENT MEDICATIONS:    Current Outpatient Medications:      acetaminophen (TYLENOL) 325 MG tablet, Take 325-650 mg by mouth every 6 hours as needed for mild pain, Disp: , Rfl:      Loratadine-Pseudoephedrine (CLARITIN-D 24 HOUR PO), Take 1 tablet by mouth daily , Disp: , Rfl:      multivitamin, therapeutic (THERA-VIT) TABS tablet, Take 1 tablet by mouth daily, Disp: , Rfl:     SOCIAL HISTORY:  Social History     Socioeconomic History     Marital status:      Spouse name: Not on file     Number of children: Not on file     Years of education: Not on file     Highest education level: Not on file   Occupational History     Not on file   Social Needs     Financial resource strain: Not on file     Food insecurity     Worry: Not on file     Inability: Not on file     Transportation needs     Medical: Not on file     Non-medical: Not on file   Tobacco Use     Smoking status: Never Smoker     Smokeless tobacco: Never Used   Substance and Sexual Activity     Alcohol use: Yes     Comment: 1/day, beer or scotch     Drug use: No     Sexual activity: Not on file   Lifestyle     Physical activity     Days per week: Not on file     Minutes per session: Not on file     Stress: Not on file   Relationships     Social connections     Talks on phone: Not on file     Gets together: Not on file     Attends Jehovah's witness service: Not on file     Active member of club or organization: Not on file     Attends meetings of clubs or organizations: Not on file     Relationship status: Not on file     Intimate partner violence     Fear of current or ex partner: Not on file     Emotionally abused: Not on file     Physically abused: Not on file     Forced sexual activity: Not on file   Other Topics Concern     Not on file   Social History Narrative      Not on file       FAMILY HISTORY:  Family History   Problem Relation Age of Onset     Lung Cancer Paternal Grandfather         smoker, 55     Colon Cancer No family hx of      Crohn's Disease No family hx of      Irritable Bowel Syndrome No family hx of      Inflammatory Bowel Disease No family hx of      Ulcerative Colitis No family hx of        Past/family/social history reviewed and no changes      Again, thank you for allowing me to participate in the care of your patient.       Sincerely,    Colton Valentin MD

## 2021-05-19 NOTE — PROGRESS NOTES
IBD CLINIC VISIT     CC/REFERRING MD:  Referred Self  REASON FOR CONSULTATION: Follow-up ulcerative colitis    ASSESSMENT/PLAN    1. Ulcerative colitis:   Pan ulcerative colitis that was refractory to mesalamine alone.  He is now doing well status post 3 infusions of vedolizumab.  He is in symptomatic remission.  He will continue on the vedolizumab as well as the Lialda for now.  We will check a flexible sigmoidoscopy in 5 months.  If he has mucosal healing we will likely taper off the Lialda and continue the vedolizumab alone.  He will follow-up in clinic with me 1 month after his flexible sigmoidoscopy (6 months from now)    2. IBD dysplasia surveillance:   Due in 2022    3.  Healthcare maintenance-she will continue to follow-up with our healthcare maintenance pharmacy team.    Return to clinic in 6 months    Thank you for this consultation.  It was a pleasure to participate in the care of this patient; please contact us with any further questions.      This note was created with voice recognition software, and while reviewed for accuracy, typos may remain.     Colton Valentin MD  Inflammatory Bowel Disease Program   Division of Gastroenterology, Hepatology and Nutrition  [facility]  Pager: [phone]      IBD HISTORY  Age at diagnosis: 2014  Extent of disease: colon  Current UC medications:   -vedolizumab q 8 weeks  -Lialda 4.8 gm/day  Prior UC surgeries: none  Prior IBD Medications: none     Most recent colonoscopy: 1/4/21:  Impression:          - Preparation of the colon was fair.                        - The examined portion of the ileum was normal.                        - Mild (Frausto Score 1) pancolitis ulcerative colitis.                        Biopsied.                        - The rectum is normal. Biopsied.                        - The examination was otherwise normal on direct and                        retroflexion views.                        - Consistent with mild pan-ulcerative  colitis. Given                        rectal sparing Crohn's colitis can [place] considered but                        this is felt to be less likely. It is more likely that                        he has patchy healing of his UC on therapy.         Colon path:  A. CECUM AND ASCENDING COLON, BIOPSIES:   - Moderately active chronic colitis with cryptitis and crypt abscesses   (Analia grade 3)   - Negative for dysplasia     B. TRANSVERSE COLON, BIOPSIES:   - Moderately active chronic colitis with cryptitis and crypt abscesses   (Analia grade 3)   - Negative for dysplasia     C. DESCENDING AND SIGMOID COLON, BIOPSIES:   - Moderately active chronic colitis with cryptitis and crypt abscesses   (Analia grade 3)   - Negative for dysplasia     D. RECTUM, BIOPSIES:   - Mild focal active chronic colitis with rare cryptitis (Analia grade 2)   - Negative for dysplasia     DRUG MONITORING  TPMT enzyme activity: 24 (normal)    6-TGN/6-MMPN levels: --    Biologic concentration: --    HPI:   Currently, Hakan is here today to follow-up after recently starting vedolizumab for his panulcerative colitis.  He reports things are going quite well.  He is having 2-3 bowel movements per day which is his normal.  He is not having any blood in his stools.  He is having no side effects from the vedolizumab.  He is having no extraintestinal manifestations of IBD.  He is taking his Lialda daily    Frausto score:   Stool freq: 0 (baseline stools frequency)  Rectal bleedin (None)  PGA: 0 (normal)  Endoscopy: Not done    Extra intestinal manifestations of IBD:  No uveitis/episcleritis  No aphthous ulcers   No arthritis   No erythema nodosum/pyoderma gangrenosum.     sIBDQ:  No flowsheet data found.         ROS:    Constitutional, HEENT, cardiovascular, pulmonary, GI, , musculoskeletal, neuro, skin, endocrine and psych systems are negative, except as otherwise noted.    PHYSICAL EXAMINATION:  Constitutional: aaox3, cooperative, pleasant, not  dyspneic/diaphoretic, no acute distress  Vitals reviewed: Ht 1.829 m (6')   Wt 103 kg (227 lb)   BMI 30.79 kg/m    Wt:   Wt Readings from Last 2 Encounters:   05/19/21 103 kg (227 lb)   04/14/21 103.3 kg (227 lb 12.8 oz)      Eyes: Sclera anicteric/injected  Respiratory: Unlabored breathing  Psych: Normal affect    PERTINENT PAST MEDICAL HISTORY:  Past Medical History:   Diagnosis Date     Ulcerative colitis (H) 2015     Wrist fracture, right 1992    minor, sports       PREVIOUS SURGERIES:  Past Surgical History:   Procedure Laterality Date     COLONOSCOPY  5/2015     COLONOSCOPY N/A 1/4/2021    Procedure: COLONOSCOPY, WITH  BIOPSY;  Surgeon: Colton Valentin MD;  [facility] [facility] [place] [facility]      TOOTH EXTRACTION W/FORCEP  2009       ALLERGIES:   No Known Allergies    PERTINENT MEDICATIONS:    Current Outpatient Medications:      acetaminophen (TYLENOL) 325 MG tablet, Take 325-650 mg by mouth every 6 hours as needed for mild pain, Disp: , Rfl:      Loratadine-Pseudoephedrine (CLARITIN-D 24 HOUR PO), Take 1 tablet by mouth daily , Disp: , Rfl:      multivitamin, therapeutic (THERA-VIT) TABS tablet, Take 1 tablet by mouth daily, Disp: , Rfl:     SOCIAL HISTORY:  Social History     Socioeconomic History     Marital status:      Spouse name: Not on file     Number of children: Not on file     Years of education: Not on file     Highest education level: Not on file   Occupational History     Not on file   Social Needs     Financial resource strain: Not on file     Food insecurity     Worry: Not on file     Inability: Not on file     Transportation needs     Medical: Not on file     Non-medical: Not on file   Tobacco Use     Smoking status: Never Smoker     Smokeless tobacco: Never Used   Substance and Sexual Activity     Alcohol use: Yes     Comment: 1/day, beer or scotch     Drug use: No     Sexual activity: Not on file   Lifestyle     Physical activity     Days per week: Not on file     Minutes per session:  Not on file     Stress: Not on file   Relationships     Social connections     Talks on phone: Not on file     Gets together: Not on file     Attends Worship service: Not on file     Active member of club or organization: Not on file     Attends meetings of clubs or organizations: Not on file     Relationship status: Not on file     Intimate partner violence     Fear of current or ex partner: Not on file     Emotionally abused: Not on file     Physically abused: Not on file     Forced sexual activity: Not on file   Other Topics Concern     Not on file   Social History Narrative     Not on file       FAMILY HISTORY:  Family History   Problem Relation Age of Onset     Lung Cancer Paternal Grandfather         smoker, 55     Colon Cancer No family hx of      Crohn's Disease No family hx of      Irritable Bowel Syndrome No family hx of      Inflammatory Bowel Disease No family hx of      Ulcerative Colitis No family hx of        Past/family/social history reviewed and no changes

## 2021-05-19 NOTE — PROGRESS NOTES
"Raymond Aguayo is a 40 year old male who is being evaluated via a billable video visit.      The patient has been notified of following:     \"This video visit will be conducted via a call between you and your physician/provider. We have found that certain health care needs can be provided without the need for an in-person physical exam.  This service lets us provide the care you need with a video conversation.  If a prescription is necessary we can send it directly to your pharmacy.  If lab work is needed we can place an order for that and you can then stop by our lab to have the test done at a later time.    If during the course of the call the physician/provider feels a video visit is not appropriate, you will not be charged for this service.\"     Patient confirmed that they are in [place] for today's visit Yes    Video-Visit Details  Type of service:  Video Visit    Video Start Time: 3:51 PM  Video End Time:  3:59 PM    Originating Location (pt. Location): Home    Distant Location (provider location):  [facility] [place]     Platform used: Kiana            "

## 2021-06-01 ENCOUNTER — APPOINTMENT (OUTPATIENT)
Dept: LAB | Facility: CLINIC | Age: 41
End: 2021-06-01
Attending: INTERNAL MEDICINE
Payer: COMMERCIAL

## 2021-06-08 ENCOUNTER — HOME INFUSION (PRE-WILLOW HOME INFUSION) (OUTPATIENT)
Dept: PHARMACY | Facility: CLINIC | Age: 41
End: 2021-06-08

## 2021-06-09 ENCOUNTER — DOCUMENTATION ONLY (OUTPATIENT)
Dept: PHARMACY | Facility: CLINIC | Age: 41
End: 2021-06-09

## 2021-06-09 ENCOUNTER — HOME INFUSION (PRE-WILLOW HOME INFUSION) (OUTPATIENT)
Dept: PHARMACY | Facility: CLINIC | Age: 41
End: 2021-06-09

## 2021-06-09 NOTE — PROGRESS NOTES
Skilled Nurse visit in the  patient home to administer ENTYVIO (VEDOLIZUMAB) 300MG VIAL .  No recent elevated temperature, fever, chills, productive cough, coughing for 3 weeks or longer or hemoptysis, abnormal vital signs, night sweats, chest pain. No  decrease in your appetite, unexplained weight loss or fatigue.  No other new onset medical symptoms.  Current weight 227 lbs.  PIV placed left forearm, 1 attempt.  Infusion completed without complication or reaction. Pt reports therapy is effective in managing symptoms related to therapy.      Kelli Neves RN, BSN  [facility]  [phone]  [email or web address]

## 2021-06-14 NOTE — PROGRESS NOTES
This is a recent snapshot of the patient's [facility] medical record.  For current drug dose and complete information and questions, call [phone] or In Basket pool, fv home infusion ([id number])  CSN Number:  [id number]

## 2021-06-25 NOTE — PROGRESS NOTES
This is a recent snapshot of the patient's [facility] medical record.  For current drug dose and complete information and questions, call [phone] or In Basket pool, fv home infusion ([id number])  CSN Number:  [id number]

## 2021-07-01 ENCOUNTER — APPOINTMENT (OUTPATIENT)
Dept: LAB | Facility: CLINIC | Age: 41
End: 2021-07-01
Attending: INTERNAL MEDICINE
Payer: COMMERCIAL

## 2021-08-04 ENCOUNTER — DOCUMENTATION ONLY (OUTPATIENT)
Dept: PHARMACY | Facility: CLINIC | Age: 41
End: 2021-08-04

## 2021-08-04 ENCOUNTER — MEDICAL CORRESPONDENCE (OUTPATIENT)
Dept: HEALTH INFORMATION MANAGEMENT | Facility: CLINIC | Age: 41
End: 2021-08-04

## 2021-08-04 ENCOUNTER — HOME INFUSION (PRE-WILLOW HOME INFUSION) (OUTPATIENT)
Dept: PHARMACY | Facility: CLINIC | Age: 41
End: 2021-08-04

## 2021-08-04 ENCOUNTER — LAB REQUISITION (OUTPATIENT)
Dept: LAB | Facility: CLINIC | Age: 41
End: 2021-08-04
Payer: COMMERCIAL

## 2021-08-04 DIAGNOSIS — K51.90 ULCERATIVE COLITIS, UNSPECIFIED, WITHOUT COMPLICATIONS (H): ICD-10-CM

## 2021-08-04 LAB
ALBUMIN SERPL-MCNC: 3.8 G/DL (ref 3.4–5)
ALP SERPL-CCNC: 90 U/L (ref 40–150)
ALT SERPL W P-5'-P-CCNC: 56 U/L (ref 0–70)
AST SERPL W P-5'-P-CCNC: 22 U/L (ref 0–45)
BASOPHILS # BLD AUTO: 0 10E3/UL (ref 0–0.2)
BASOPHILS NFR BLD AUTO: 1 %
BILIRUB DIRECT SERPL-MCNC: <0.1 MG/DL (ref 0–0.2)
BILIRUB SERPL-MCNC: 0.4 MG/DL (ref 0.2–1.3)
CRP SERPL-MCNC: <2.9 MG/L (ref 0–8)
EOSINOPHIL # BLD AUTO: 0.2 10E3/UL (ref 0–0.7)
EOSINOPHIL NFR BLD AUTO: 3 %
ERYTHROCYTE [DISTWIDTH] IN BLOOD BY AUTOMATED COUNT: 11.9 % (ref 10–15)
ERYTHROCYTE [SEDIMENTATION RATE] IN BLOOD BY WESTERGREN METHOD: 5 MM/HR (ref 0–15)
HCT VFR BLD AUTO: 40.9 % (ref 40–53)
HGB BLD-MCNC: 14.4 G/DL (ref 13.3–17.7)
IMM GRANULOCYTES # BLD: 0 10E3/UL
IMM GRANULOCYTES NFR BLD: 0 %
LYMPHOCYTES # BLD AUTO: 1.7 10E3/UL (ref 0.8–5.3)
LYMPHOCYTES NFR BLD AUTO: 31 %
MCH RBC QN AUTO: 29.5 PG (ref 26.5–33)
MCHC RBC AUTO-ENTMCNC: 35.2 G/DL (ref 31.5–36.5)
MCV RBC AUTO: 84 FL (ref 78–100)
MONOCYTES # BLD AUTO: 0.6 10E3/UL (ref 0–1.3)
MONOCYTES NFR BLD AUTO: 11 %
NEUTROPHILS # BLD AUTO: 2.9 10E3/UL (ref 1.6–8.3)
NEUTROPHILS NFR BLD AUTO: 54 %
NRBC # BLD AUTO: 0 10E3/UL
NRBC BLD AUTO-RTO: 0 /100
PLATELET # BLD AUTO: 251 10E3/UL (ref 150–450)
PROT SERPL-MCNC: 6.9 G/DL (ref 6.8–8.8)
RBC # BLD AUTO: 4.88 10E6/UL (ref 4.4–5.9)
WBC # BLD AUTO: 5.4 10E3/UL (ref 4–11)

## 2021-08-04 PROCEDURE — 85652 RBC SED RATE AUTOMATED: CPT | Mod: ORL | Performed by: INTERNAL MEDICINE

## 2021-08-04 PROCEDURE — 36415 COLL VENOUS BLD VENIPUNCTURE: CPT | Performed by: INTERNAL MEDICINE

## 2021-08-04 PROCEDURE — 80076 HEPATIC FUNCTION PANEL: CPT | Performed by: INTERNAL MEDICINE

## 2021-08-04 PROCEDURE — 86140 C-REACTIVE PROTEIN: CPT | Performed by: INTERNAL MEDICINE

## 2021-08-04 PROCEDURE — 85025 COMPLETE CBC W/AUTO DIFF WBC: CPT | Performed by: INTERNAL MEDICINE

## 2021-08-04 NOTE — PROGRESS NOTES
Skilled Nurse visit in the  patient home to administer Entyvio 300mg.  No recent elevated temperature, fever, chills, productive cough, coughing for 3 weeks or longer or hemoptysis, abnormal vital signs, night sweats, chest pain. No  decrease in your appetite, unexplained weight loss or fatigue.  No other new onset medical symptoms.  Current weight 228 lbs.  PIV placed left forearm, 1 attempt.  Labs drawn CBC d/p, ESR, hepatic panel and CRP-inflammation. Infusion completed without complication or reaction. Pt reports therapy is effective in managing symptoms related to therapy.      Kelli Neves RN, BSN  [facility]  [phone]  [email or web address]

## 2021-08-10 NOTE — PROGRESS NOTES
This is a recent snapshot of the patient's [facility] medical record.  For current drug dose and complete information and questions, call [phone] or In Basket pool, fv home infusion ([id number])  CSN Number:  [id number]

## 2021-09-04 ENCOUNTER — HEALTH MAINTENANCE LETTER (OUTPATIENT)
Age: 41
End: 2021-09-04

## 2021-09-27 ENCOUNTER — PATIENT OUTREACH (OUTPATIENT)
Dept: GASTROENTEROLOGY | Facility: CLINIC | Age: 41
End: 2021-09-27

## 2021-09-27 ENCOUNTER — HOME INFUSION (PRE-WILLOW HOME INFUSION) (OUTPATIENT)
Dept: PHARMACY | Facility: CLINIC | Age: 41
End: 2021-09-27

## 2021-09-27 NOTE — TELEPHONE ENCOUNTER
Discussed with Dr Valentin and Ms Pike   As long as patient is symptomatic may go ahead with infusion tomorrow  Soren Fernandez [facility] pharmacist and she will contact his nurse  Another my chart message to go ahead as long as he does not have symptoms.

## 2021-09-27 NOTE — TELEPHONE ENCOUNTER
Received a call from [facility] infusion nurse that patient had exposure to  someone who test positive for covid  Patient due for infusion tomorrow   Patient's voice mail is full  Sent a my chart message  Talked to infusion nurse that pt had sent a text to her last night that he had an exposure to someone on Friday who tested postivie for covid  Will discuss with provider   Await call back from patient.

## 2021-09-28 ENCOUNTER — DOCUMENTATION ONLY (OUTPATIENT)
Dept: PHARMACY | Facility: CLINIC | Age: 41
End: 2021-09-28

## 2021-09-28 ENCOUNTER — HOME INFUSION (PRE-WILLOW HOME INFUSION) (OUTPATIENT)
Dept: PHARMACY | Facility: CLINIC | Age: 41
End: 2021-09-28

## 2021-09-28 NOTE — PROGRESS NOTES
Skilled Nurse visit in the  patient home to administer Entyvio 300mg.  No recent elevated temperature, fever, chills, productive cough, coughing for 3 weeks or longer or hemoptysis, abnormal vital signs, night sweats, chest pain. No  decrease in your appetite, unexplained weight loss or fatigue.  No other new onset medical symptoms.  Current weight 227 lbs.  PIV placed left AC, 1  Attempt.  Infusion completed without complication or reaction. Pt reports therapy is effective  in managing symptoms related to therapy.    Kelli Neves RN, BSN  [facility]  [phone]  [email or web address]

## 2021-10-12 ENCOUNTER — TELEPHONE (OUTPATIENT)
Dept: PHARMACY | Facility: CLINIC | Age: 41
End: 2021-10-12

## 2021-10-12 NOTE — TELEPHONE ENCOUNTER
[facility] PRIOR AUTHORIZATION REQUEST     Drug including DOSE: ENTYVIO 300 mg IV Every 8 Weeks  J Code:  NDC: [address]-91  ICD 10 code: K51.90    Date(s) of Service: 10/27/2021    Insurance Name: [employer]  Insurance ID:[id number]    Provider: SIENA ECHEVARRIA  Provider NPI:[id number]        [facility]  NPI: [id number]

## 2021-10-13 NOTE — TELEPHONE ENCOUNTER
PA Initiation    Medication: ENTYVIO 300 mg IV Every 8 Weeks  Insurance Company: [employer] - Phone [phone] Fax [phone]  Pharmacy Filling the Rx: [facility]  Filling Pharmacy Phone:    Filling Pharmacy Fax:    Start Date: 10/13/2021    Central Prior Authorization Team   Phone: [phone]

## 2021-10-14 NOTE — TELEPHONE ENCOUNTER
Prior Authorization Approval    Authorization Effective Date: 10/27/2021  Authorization Expiration Date: 4/24/2022  Medication: ENTYVIO 300 mg IV Every 8 Weeks  Approved Dose/Quantity: 4 doses  Reference #: [id number]    Insurance Company: [employer] - Phone [phone] Fax [phone]   Which Pharmacy is filling the prescription (Not needed for infusion/clinic administered): [facility]  Pharmacy Notified:  yes

## 2021-10-25 NOTE — PROGRESS NOTES
This is a recent snapshot of the patient's [facility] medical record.  For current drug dose and complete information and questions, call [phone] or In Basket pool, fv home infusion ([id number])  CSN Number:  [id number]

## 2021-11-02 ENCOUNTER — VIRTUAL VISIT (OUTPATIENT)
Dept: GASTROENTEROLOGY | Facility: CLINIC | Age: 41
End: 2021-11-02
Payer: COMMERCIAL

## 2021-11-02 VITALS — WEIGHT: 227 LBS | BODY MASS INDEX: 30.79 KG/M2

## 2021-11-02 DIAGNOSIS — K51.00 ULCERATIVE PANCOLITIS WITHOUT COMPLICATION (H): Primary | ICD-10-CM

## 2021-11-02 PROCEDURE — 99214 OFFICE O/P EST MOD 30 MIN: CPT | Mod: 95 | Performed by: INTERNAL MEDICINE

## 2021-11-02 NOTE — PATIENT INSTRUCTIONS
It was good to meet with you again during your virtual visit.  Am so happy things are going well.  I have outlined my recommendations below.    My office will contact you about scheduling the flexible sigmoidoscopy.    Please continue your vedolizumab infusions every 8 weeks.  We will adjust this as needed depending on the results of your sigmoidoscopy.    Please continue your Lialda 4 pills daily for now.  If things look good on the sigmoidoscopy we can likely stop this medication.    My office will contact you about scheduling an appointment with our health maintenance team.  The provider who typically handles this meeting is named Alondra and she is one of our pharmacists.    Follow-up in 6 months.

## 2021-11-02 NOTE — PROGRESS NOTES
BD CLINIC VISIT     CC/REFERRING MD:  Referred Self  REASON FOR CONSULTATION: Follow-up ulcerative colitis    ASSESSMENT/PLAN    1. Ulcerative colitis:   Current medications: Vedolizumab 300 mg IV every 8 weeks and Lialda 4.8 g daily.  Current clinical disease activity: Remission-possible breakthrough symptoms at the end of infusion cycle.  Last endoscopic disease activity: Pending    -Schedule flexible sigmoidoscopy to look for mucosal healing.  If inflammation seen we will discuss increasing the vedolizumab.  -If he has mucosal healing and has occasional symptoms are likely related to irritable bowel syndrome.  -Continue vedolizumab and the other for now.  -If he does have mucosal healing we can likely taper off the Lialda.    2. IBD dysplasia surveillance:   Due 2022    3. Health Care Maintenance: We will refer to MT.    Return to clinic in 6 months    Thank you for this consultation.  It was a pleasure to participate in the care of this patient; please contact us with any further questions.      This note was created with voice recognition software, and while reviewed for accuracy, typos may remain.     Colton Valentin MD  Inflammatory Bowel Disease Program   Division of Gastroenterology, Hepatology and Nutrition  [facility]          IBD HISTORY  Age at diagnosis: [age]  Extent of disease: colon  Current UC medications:   -vedolizumab q 8 weeks  -Lialda 4.8 gm/day  Prior UC surgeries: none  Prior IBD Medications: none     Most recent colonoscopy: 1/4/21:  Impression:          - Preparation of the colon was fair.                        - The examined portion of the ileum was normal.                        - Mild (Frausto Score 1) pancolitis ulcerative colitis.                        Biopsied.                        - The rectum is normal. Biopsied.                        - The examination was otherwise normal on direct and                        retroflexion views.                        -  Consistent with mild pan-ulcerative colitis. Given                        rectal sparing Crohn's colitis can [place] considered but                        this is felt to be less likely. It is more likely that                        he has patchy healing of his UC on therapy.         Colon path:  A. CECUM AND ASCENDING COLON, BIOPSIES:   - Moderately active chronic colitis with cryptitis and crypt abscesses   (Analia grade 3)   - Negative for dysplasia     B. TRANSVERSE COLON, BIOPSIES:   - Moderately active chronic colitis with cryptitis and crypt abscesses   (Analia grade 3)   - Negative for dysplasia     C. DESCENDING AND SIGMOID COLON, BIOPSIES:   - Moderately active chronic colitis with cryptitis and crypt abscesses   (Analia grade 3)   - Negative for dysplasia     D. RECTUM, BIOPSIES:   - Mild focal active chronic colitis with rare cryptitis (Analia grade 2)   - Negative for dysplasia      DRUG MONITORING  TPMT enzyme activity: 24 (normal)     6-TGN/6-MMPN levels: --     Biologic concentration: --       HPI:   Currently, here today to follow-up in clinic.    He reports he is doing well.  He has 3 bowel movements a day that are formed.  There is no blood.  There is no pain.    In a week or 2 leading up to his infusion he may have 1 or 2 extra bowel movements.  BMs may be looser and a little more urgent.  He denies any tenesmus.  There is no blood.    He continues to take Lialda.  He is due for his follow-up flexible sigmoidoscopy.    Frausto score:   Stool freq: 0 (baseline stools frequency)  Rectal bleedin (None)  PGA: 0 (normal)  Endoscopy: Not done    Extra intestinal manifestations of IBD:  No uveitis/episcleritis  No aphthous ulcers   No arthritis   No erythema nodosum/pyoderma gangrenosum.     sIBDQ:  No flowsheet data found.       ROS:    Constitutional, HEENT, cardiovascular, pulmonary, GI, , musculoskeletal, neuro, skin, endocrine and psych systems are negative, except as otherwise noted.    PHYSICAL  EXAMINATION:  Constitutional: aaox3, cooperative, pleasant, not dyspneic/diaphoretic, no acute distress  Vitals reviewed: Wt 103 kg (227 lb)   BMI 30.79 kg/m    Wt:   Wt Readings from Last 2 Encounters:   11/02/21 103 kg (227 lb)   05/19/21 103 kg (227 lb)      Eyes: Sclera anicteric/injected  Respiratory: Unlabored breathing  Skin: no jaundice  Psych: Normal affect    PERTINENT PAST MEDICAL HISTORY:  Past Medical History:   Diagnosis Date     Ulcerative colitis (H) 2015     Wrist fracture, right 1992    minor, sports       PREVIOUS SURGERIES:  Past Surgical History:   Procedure Laterality Date     COLONOSCOPY  5/2015     COLONOSCOPY N/A 1/4/2021    Procedure: COLONOSCOPY, WITH  BIOPSY;  Surgeon: Colton Valentin MD;  Location: [facility] OR      TOOTH EXTRACTION W/FORCEP  2009       ALLERGIES:   No Known Allergies    PERTINENT MEDICATIONS:    Current Outpatient Medications:      acetaminophen (TYLENOL) 325 MG tablet, Take 325-650 mg by mouth every 6 hours as needed for mild pain, Disp: , Rfl:      Loratadine-Pseudoephedrine (CLARITIN-D 24 HOUR PO), Take 1 tablet by mouth daily , Disp: , Rfl:      multivitamin, therapeutic (THERA-VIT) TABS tablet, Take 1 tablet by mouth daily, Disp: , Rfl:     SOCIAL HISTORY:  Social History     Socioeconomic History     Marital status:      Spouse name: Not on file     Number of children: Not on file     Years of education: Not on file     Highest education level: Not on file   Occupational History     Not on file   Tobacco Use     Smoking status: Never Smoker     Smokeless tobacco: Never Used   Substance and Sexual Activity     Alcohol use: Yes     Comment: 1/day, beer or scotch     Drug use: No     Sexual activity: Not on file   Other Topics Concern     Not on file   Social History Narrative     Not on file     Social Determinants of Health     Financial Resource Strain:      Difficulty of Paying Living Expenses:    Food Insecurity:      Worried About Running Out of  Food in the Last Year:      Ran Out of Food in the Last Year:    Transportation Needs:      Lack of Transportation (Medical):      Lack of Transportation (Non-Medical):    Physical Activity:      Days of Exercise per Week:      Minutes of Exercise per Session:    Stress:      Feeling of Stress :    Social Connections:      Frequency of Communication with Friends and Family:      Frequency of Social Gatherings with Friends and Family:      Attends Catholic Services:      Active Member of Clubs or Organizations:      Attends Club or Organization Meetings:      Marital Status:    Intimate Partner Violence:      Fear of Current or Ex-Partner:      Emotionally Abused:      Physically Abused:      Sexually Abused:        FAMILY HISTORY:  Family History   Problem Relation Age of Onset     Lung Cancer Paternal Grandfather         smoker, 55     Colon Cancer No family hx of      Crohn's Disease No family hx of      Irritable Bowel Syndrome No family hx of      Inflammatory Bowel Disease No family hx of      Ulcerative Colitis No family hx of        Past/family/social history reviewed and no changes

## 2021-11-02 NOTE — LETTER
"    11/2/2021         RE: Raymond Aguayo  [address] [facility] [address]        Dear Colleague,    Thank you for referring your patient, Raymond Aguayo, to the [facility] [place]. Please see a copy of my visit note below.    Raymond Aguayo is a 41 year old male who is being evaluated via a billable video visit.      The patient has been notified of following:     \"This video visit will be conducted via a call between you and your physician/provider. We have found that certain health care needs can be provided without the need for an in-person physical exam.  This service lets us provide the care you need with a video conversation.  If a prescription is necessary we can send it directly to your pharmacy.  If lab work is needed we can place an order for that and you can then stop by our lab to have the test done at a later time.    If during the course of the call the physician/provider feels a video visit is not appropriate, you will not be charged for this service.\"     Patient confirmed that they are in [place] for today's visit yes.    Video-Visit Details  Type of service:  Video Visit    Video Start Time: 4:29 PM  Video End Time:  4:36 PM    Originating Location (pt. Location): Home    Distant Location (provider location):  [facility] [place]     Platform used: [place] CLINIC VISIT     CC/REFERRING MD:  Referred Self  REASON FOR CONSULTATION: Follow-up ulcerative colitis    ASSESSMENT/PLAN    1. Ulcerative colitis:   Current medications: Vedolizumab 300 mg IV every 8 weeks and Lialda 4.8 g daily.  Current clinical disease activity: Remission-possible breakthrough symptoms at the end of infusion cycle.  Last endoscopic disease activity: Pending    -Schedule flexible sigmoidoscopy to look for mucosal healing.  If inflammation seen we will discuss increasing the vedolizumab.  -If he has mucosal healing and has occasional " symptoms are likely related to irritable bowel syndrome.  -Continue vedolizumab and the other for now.  -If he does have mucosal healing we can likely taper off the Lialda.    2. IBD dysplasia surveillance:   Due 2022    3. Health Care Maintenance: We will refer to MT.    Return to clinic in 6 months    Thank you for this consultation.  It was a pleasure to participate in the care of this patient; please contact us with any further questions.      This note was created with voice recognition software, and while reviewed for accuracy, typos may remain.     Colton Valentin MD  Inflammatory Bowel Disease Program   Division of Gastroenterology, Hepatology and Nutrition  [facility]          IBD HISTORY  Age at diagnosis: 2014  Extent of disease: colon  Current UC medications:   -vedolizumab q 8 weeks  -Lialda 4.8 gm/day  Prior UC surgeries: none  Prior IBD Medications: none     Most recent colonoscopy: 1/4/21:  Impression:          - Preparation of the colon was fair.                        - The examined portion of the ileum was normal.                        - Mild (Frausto Score 1) pancolitis ulcerative colitis.                        Biopsied.                        - The rectum is normal. Biopsied.                        - The examination was otherwise normal on direct and                        retroflexion views.                        - Consistent with mild pan-ulcerative colitis. Given                        rectal sparing Crohn's colitis can [place] considered but                        this is felt to be less likely. It is more likely that                        he has patchy healing of his UC on therapy.         Colon path:  A. CECUM AND ASCENDING COLON, BIOPSIES:   - Moderately active chronic colitis with cryptitis and crypt abscesses   (Analia grade 3)   - Negative for dysplasia     B. TRANSVERSE COLON, BIOPSIES:   - Moderately active chronic colitis with cryptitis and crypt abscesses    (Analia grade 3)   - Negative for dysplasia     C. DESCENDING AND SIGMOID COLON, BIOPSIES:   - Moderately active chronic colitis with cryptitis and crypt abscesses   (Analia grade 3)   - Negative for dysplasia     D. RECTUM, BIOPSIES:   - Mild focal active chronic colitis with rare cryptitis (Analia grade 2)   - Negative for dysplasia      DRUG MONITORING  TPMT enzyme activity: 24 (normal)     6-TGN/6-MMPN levels: --     Biologic concentration: --       HPI:   Currently, here today to follow-up in clinic.    He reports he is doing well.  He has 3 bowel movements a day that are formed.  There is no blood.  There is no pain.    In a week or 2 leading up to his infusion he may have 1 or 2 extra bowel movements.  BMs may be looser and a little more urgent.  He denies any tenesmus.  There is no blood.    He continues to take Lialda.  He is due for his follow-up flexible sigmoidoscopy.    Frausto score:   Stool freq: 0 (baseline stools frequency)  Rectal bleedin (None)  PGA: 0 (normal)  Endoscopy: Not done    Extra intestinal manifestations of IBD:  No uveitis/episcleritis  No aphthous ulcers   No arthritis   No erythema nodosum/pyoderma gangrenosum.     sIBDQ:  No flowsheet data found.       ROS:    Constitutional, HEENT, cardiovascular, pulmonary, GI, , musculoskeletal, neuro, skin, endocrine and psych systems are negative, except as otherwise noted.    PHYSICAL EXAMINATION:  Constitutional: aaox3, cooperative, pleasant, not dyspneic/diaphoretic, no acute distress  Vitals reviewed: Wt 103 kg (227 lb)   BMI 30.79 kg/m    Wt:   Wt Readings from Last 2 Encounters:   21 103 kg (227 lb)   21 103 kg (227 lb)      Eyes: Sclera anicteric/injected  Respiratory: Unlabored breathing  Skin: no jaundice  Psych: Normal affect    PERTINENT PAST MEDICAL HISTORY:  Past Medical History:   Diagnosis Date     Ulcerative colitis (H) 2015     Wrist fracture, right     minor, sports       PREVIOUS SURGERIES:  Past Surgical  History:   Procedure Laterality Date     COLONOSCOPY  5/2015     COLONOSCOPY N/A 1/4/2021    Procedure: COLONOSCOPY, WITH  BIOPSY;  Surgeon: Colton Valentin MD;  Location: [facility] OR      TOOTH EXTRACTION W/FORCEP  2009       ALLERGIES:   No Known Allergies    PERTINENT MEDICATIONS:    Current Outpatient Medications:      acetaminophen (TYLENOL) 325 MG tablet, Take 325-650 mg by mouth every 6 hours as needed for mild pain, Disp: , Rfl:      Loratadine-Pseudoephedrine (CLARITIN-D 24 HOUR PO), Take 1 tablet by mouth daily , Disp: , Rfl:      multivitamin, therapeutic (THERA-VIT) TABS tablet, Take 1 tablet by mouth daily, Disp: , Rfl:     SOCIAL HISTORY:  Social History     Socioeconomic History     Marital status:      Spouse name: Not on file     Number of children: Not on file     Years of education: Not on file     Highest education level: Not on file   Occupational History     Not on file   Tobacco Use     Smoking status: Never Smoker     Smokeless tobacco: Never Used   Substance and Sexual Activity     Alcohol use: Yes     Comment: 1/day, beer or scotch     Drug use: No     Sexual activity: Not on file   Other Topics Concern     Not on file   Social History Narrative     Not on file     Social Determinants of Health     Financial Resource Strain:      Difficulty of Paying Living Expenses:    Food Insecurity:      Worried About Running Out of Food in the Last Year:      Ran Out of Food in the Last Year:    Transportation Needs:      Lack of Transportation (Medical):      Lack of Transportation (Non-Medical):    Physical Activity:      Days of Exercise per Week:      Minutes of Exercise per Session:    Stress:      Feeling of Stress :    Social Connections:      Frequency of Communication with Friends and Family:      Frequency of Social Gatherings with Friends and Family:      Attends Sabianist Services:      Active Member of Clubs or Organizations:      Attends Club or Organization Meetings:       Marital Status:    Intimate Partner Violence:      Fear of Current or Ex-Partner:      Emotionally Abused:      Physically Abused:      Sexually Abused:        FAMILY HISTORY:  Family History   Problem Relation Age of Onset     Lung Cancer Paternal Grandfather         smoker, 55     Colon Cancer No family hx of      Crohn's Disease No family hx of      Irritable Bowel Syndrome No family hx of      Inflammatory Bowel Disease No family hx of      Ulcerative Colitis No family hx of        Past/family/social history reviewed and no changes      Again, thank you for allowing me to participate in the care of your patient.        Sincerely,        Colton Valentin MD

## 2021-11-02 NOTE — PROGRESS NOTES
"Raymond Aguayo is a 41 year old male who is being evaluated via a billable video visit.      The patient has been notified of following:     \"This video visit will be conducted via a call between you and your physician/provider. We have found that certain health care needs can be provided without the need for an in-person physical exam.  This service lets us provide the care you need with a video conversation.  If a prescription is necessary we can send it directly to your pharmacy.  If lab work is needed we can place an order for that and you can then stop by our lab to have the test done at a later time.    If during the course of the call the physician/provider feels a video visit is not appropriate, you will not be charged for this service.\"     Patient confirmed that they are in [place] for today's visit yes.    Video-Visit Details  Type of service:  Video Visit    Video Start Time: 4:29 PM  Video End Time:  4:36 PM    Originating Location (pt. Location): Home    Distant Location (provider location):  [facility] [place]     Platform used: Kiana            "

## 2021-11-02 NOTE — NURSING NOTE
Chief Complaint   Patient presents with     Follow Up       Vitals:    11/02/21 1611   Weight: 103 kg (227 lb)       Body mass index is 30.79 kg/m .    Lyla Julian CMA

## 2021-11-04 ENCOUNTER — PATIENT OUTREACH (OUTPATIENT)
Dept: GASTROENTEROLOGY | Facility: CLINIC | Age: 41
End: 2021-11-04

## 2021-11-04 DIAGNOSIS — K51.00 ULCERATIVE PANCOLITIS WITHOUT COMPLICATION (H): Primary | ICD-10-CM

## 2021-11-04 NOTE — TELEPHONE ENCOUNTER
Estelita Bill, RN  Referral to MTM for health maintenance     Also needs follow up with me or Jorge in 6 months      Sent a my chart message and left a voice mail message  In basket to schedulers to schedule 6 month follow up with Ms Fernandez  Referral for mtm placed

## 2021-11-11 ENCOUNTER — VIRTUAL VISIT (OUTPATIENT)
Dept: PHARMACY | Facility: CLINIC | Age: 41
End: 2021-11-11
Attending: INTERNAL MEDICINE
Payer: COMMERCIAL

## 2021-11-11 DIAGNOSIS — K51.00 ULCERATIVE PANCOLITIS WITHOUT COMPLICATION (H): Primary | ICD-10-CM

## 2021-11-11 PROCEDURE — 99606 MTMS BY PHARM EST 15 MIN: CPT | Performed by: PHARMACIST

## 2021-11-11 PROCEDURE — 99607 MTMS BY PHARM ADDL 15 MIN: CPT | Performed by: PHARMACIST

## 2021-11-11 NOTE — Clinical Note
Hi Dr. Valentin, I noticed in meeting with Hakan that his Lialda is  so it fell off his medication list. Okay to send a 6 month supply? I know you guys are potentially de-escalating this pending flex sig. Also, he is interested in meeting with Santhosh. I can help arrange this if it is okay with you. Thank you

## 2021-11-11 NOTE — PROGRESS NOTES
Medication Therapy Management (MTM) Encounter    ASSESSMENT:                            Medication Adherence/Access: No issues identified    Ulcerative Pancolitis: We reviewed Hakan's health maintenance today and identified need for the seasonal influenza vaccine, COVID-19 third dose and considerations for the Shingrix series. He prefers to defer the Shingrix series out to the summer when he does not have as much going on with work. To avoid gaps in therapy, I can help coordinate a new order for Lialda with Dr. Valentin. Given desire to learn more about impact of diet on IBD, I will also ask Dr. Valentin about a referral to meet with Santhosh PhD.    PLAN:                            1. Hakan to consider the following vaccines:    -- seasonal influenza   -- COVID-19   -- Shingrix (prefers to defer to summer)    2. Mirtha to coordinate Lialda refill and referral to meet with Santhosh PhD    Follow-up: health maintenance review in 1 year, sooner if needed    SUBJECTIVE/OBJECTIVE:                          Raymond Aguayo is a 41 year old male contacted via secure video for a follow-up visit. He was referred to me from Dr. Valentin.  Today's visit is a follow-up MTM visit from 2/3/21.     Reason for visit: health maintenance referral     Allergies/ADRs: None  Tobacco: He reports that he has never smoked. He has never used smokeless tobacco.  Alcohol: 4-6 beverages / week    Medication Adherence/Access: no issues reported    Ulcerative Pancolitis:   Entyvio 300 mg every 8 weeks  Lialda 4.8 g daily  Multivitamin daily      In reviewing his medications, it looks as though his Lalda is no longer active. He notes he has been filling this consistently. He recently met with Dr. Valentin on 11/2/21 with plan to continue Entyvio and Lialda for now. May escalate Entyvio or de-escalate Lialda depending on the results of his flex sig, which is not yet scheduled. He is interested in knowing more about impact of diet on IBD/general health and would like  to consider meeting with our dietitian Santhosh.     IBD Health Care Maintenance:    Vaccinations:  All patients on biologics should avoid live vaccines.    -- Influenza (every year) due for 21-22 season  -- TdaP (every 10 years) 8/27/2020  -- Pneumococcal Pneumonia (once plus booster at 5 years)              -Prevnar-13 8/27/20              -Pneumovax-23 11/5/20, booster due in 11/2025  -- COVID-19 3/26/21 and 4/16/21  -- Yearly assessment for latent Tb (verbal screening and exam, PPD or QuantiFERON-Tb testing)              Negative 1/7/21     One time confirmation of immunity or serologies:  -- Hepatitis A (serologies or immunizations) unsure, reports two tours in [place] -- [employer]   -- Hepatitis B (serologies or immunizations) serologies indicate immunity 2021  -- Varicella had the chicken pox, consider Shingrix  -- MMR 8/1992     Due to the immunosuppression in this patient, I would not advise administration of live vaccines such as varicella/VZV, intranasal influenza, MMR, or yellow fever vaccine (if traveling).      Cancer Screening:  Colon cancer screening:  Dysplasia screening due in 2022 per Dr. Valentin.    Skin cancer screening: Annual visual exam of skin by dermatologist since patient is immunocompromised   -- discussed selectivity of Entyvio today    Depression Screening:  -- Over the last month, have you felt down, depressed, or hopeless? No  -- Over the last month, have you felt little interest or pleasure doing things? No    Misc:  -- Avoid tobacco use  -- Avoid NSAIDs as there is potentially a 25% chance of causing an IBD flare    ----------------    I spent 38 minutes with this patient today. I offer these suggestions for consideration by his care team. A copy of the visit note was provided to the patient's referring provider.    The patient was sent via [employer] a summary of these recommendations.     Mirtha Pike PharmD, BCACP  MTM Pharmacist   [facility]  Phone: [phone]    Telemedicine Visit Details  Type of service:  Video Conference via [employer]  Start Time: 3:07 PM  End Time: 3:45 PM  Originating Location (patient location): Home  Distant Location (provider location):  [facility]     Medication Therapy Recommendations  Ulcerative colitis (H)    Rationale: Medication product not available - Adherence - Adherence   Recommendation: Provide Adherence Intervention   Status: Contact Provider - Awaiting Response   Note: Mirtha to help coordinate fill for Lialda

## 2021-11-12 NOTE — PATIENT INSTRUCTIONS
Recommendations from today's MTM visit:                                                       1. Hakan to consider the following vaccines:    -- seasonal influenza   -- COVID-19   -- Shingrix (prefers to defer to summer)    2. Mirtha to coordinate Lialda refill and referral to meet with Santhosh Hsu    Follow-up: health maintenance review in 1 year, sooner if needed    It was great to speak with you today.  I value your experience and would be very thankful for your time with providing feedback on our clinic survey. You may receive a survey via email or text message in the next few days.     To schedule another MTM appointment, please call the clinic directly or you may call the MTM scheduling line at [phone] or toll-free at [phone].     My Clinical Pharmacist's contact information:                                                      Please feel free to contact me with any questions or concerns you have.      Mirtha VillalbaD, BCACP  MTM Pharmacist   [facility]  Phone: [phone]

## 2021-11-16 ENCOUNTER — TELEPHONE (OUTPATIENT)
Dept: GASTROENTEROLOGY | Facility: CLINIC | Age: 41
End: 2021-11-16
Payer: COMMERCIAL

## 2021-11-16 NOTE — TELEPHONE ENCOUNTER
Screening Questions  1. Are you active on [employer]? Y    2. What insurance is in the chart? [employer]    2.  Ordering/Referring Provider: Homero    3. BMI 30.8    4.  Respiratory Screening:     Do you use daily home oxygen? N  Do you have mod to severe Obstructive Sleep Apnea? N   Do you have Pulmonary Hypertension? N   Do you have SEVERE asthma? N    5. Have you had a heart or lung transplant? N    6. Are you currently on dialysis or have chronic kidney disease? N    7. Have you had a stroke or Transient ischemic attack (TIA) within 6 months? N    8. In the past 6 months, have you had any heart related issues including cardiomyopathy or heart attack? N      If yes, did it require cardiac stenting or other implantable device?N      9. Do you have any implantable devices in your body (pacemaker, defib, LVAD)? N    10. Do you take nitroglycerin? If yes, how often? N    11. Are you currently taking any blood thinners?N    12. Are you a diabetic? N    13. (Females) Are you currently pregnant?   If yes, how many weeks?      15. Are you taking any prescription pain medications on a routine schedule? N If yes, MAC sedation.    16. Do you have any chemical dependencies such as alcohol, street drugs, or methadone? N If yes, MAC sedation.    17. Do you have any history of post-traumatic stress syndrome, severe anxiety or history of psychosis? N    18. Do you transfer independently? Y    19.  Do you have any issues with constipation? N    20. Preferred Pharmacy for Pre Prescription [facility]     Scheduling Details    Which Colonoscopy Prep was Sent?:   Procedure Scheduled: Flex Sig  Surgeon: Homero  Date of Procedure: 12-22  [place] [facility] [place] (Please ask for phone number if not scheduled by patient): Hakan      Sedation Type: MAC  Conscious Sedation- Needs  for 6 hours after the procedure  MAC/General-Needs  for 24 hours after procedure    Pre-op Required at [facility], [facility] and OR for  MAC sedation:   (if yes advise patient they will need a pre-op prior to procedure)      Is patient on blood thinners? -N (If yes- inform patient to follow up with PCP or provider for follow up instructions)     Informed patient they will need an adult  Y  Cannot take any type of public or medical transportation alone    Pre-Procedure Covid test to be completed at [facility] or Externally: Y -  Lab 12-18    Confirmed Nurse will call to complete assessment Y    Additional comments:

## 2021-11-19 ENCOUNTER — PATIENT OUTREACH (OUTPATIENT)
Dept: GASTROENTEROLOGY | Facility: CLINIC | Age: 41
End: 2021-11-19
Payer: COMMERCIAL

## 2021-11-19 NOTE — TELEPHONE ENCOUNTER
Attempted to reach patient but mailbox is full  Team has been trying to reach patient to schedule flex sig  Sent a my chart message to call to schedule.

## 2021-11-22 ENCOUNTER — PATIENT OUTREACH (OUTPATIENT)
Dept: GASTROENTEROLOGY | Facility: CLINIC | Age: 41
End: 2021-11-22
Payer: COMMERCIAL

## 2021-11-22 NOTE — TELEPHONE ENCOUNTER
Contacted patient to discuss appointment with Santhosh Vaughan  Patient driving so will call back tomorrow   Sent a my chart message with my number for patient to call back.

## 2021-11-23 ENCOUNTER — DOCUMENTATION ONLY (OUTPATIENT)
Dept: PHARMACY | Facility: CLINIC | Age: 41
End: 2021-11-23

## 2021-11-23 ENCOUNTER — HOME INFUSION (PRE-WILLOW HOME INFUSION) (OUTPATIENT)
Dept: PHARMACY | Facility: CLINIC | Age: 41
End: 2021-11-23
Payer: COMMERCIAL

## 2021-11-23 ENCOUNTER — LAB REQUISITION (OUTPATIENT)
Dept: LAB | Facility: CLINIC | Age: 41
End: 2021-11-23
Payer: COMMERCIAL

## 2021-11-23 DIAGNOSIS — K51.90 ULCERATIVE COLITIS, UNSPECIFIED, WITHOUT COMPLICATIONS (H): ICD-10-CM

## 2021-11-23 LAB
ALBUMIN SERPL-MCNC: 3.4 G/DL (ref 3.4–5)
ALP SERPL-CCNC: 90 U/L (ref 40–150)
ALT SERPL W P-5'-P-CCNC: 55 U/L (ref 0–70)
AST SERPL W P-5'-P-CCNC: 29 U/L (ref 0–45)
BASOPHILS # BLD AUTO: 0.1 10E3/UL (ref 0–0.2)
BASOPHILS NFR BLD AUTO: 1 %
BILIRUB DIRECT SERPL-MCNC: <0.1 MG/DL (ref 0–0.2)
BILIRUB SERPL-MCNC: 0.4 MG/DL (ref 0.2–1.3)
CRP SERPL-MCNC: <2.9 MG/L (ref 0–8)
EOSINOPHIL # BLD AUTO: 0.2 10E3/UL (ref 0–0.7)
EOSINOPHIL NFR BLD AUTO: 3 %
ERYTHROCYTE [DISTWIDTH] IN BLOOD BY AUTOMATED COUNT: 11.7 % (ref 10–15)
ERYTHROCYTE [SEDIMENTATION RATE] IN BLOOD BY WESTERGREN METHOD: 6 MM/HR (ref 0–15)
HCT VFR BLD AUTO: 38.7 % (ref 40–53)
HGB BLD-MCNC: 13.6 G/DL (ref 13.3–17.7)
IMM GRANULOCYTES # BLD: 0 10E3/UL
IMM GRANULOCYTES NFR BLD: 0 %
LYMPHOCYTES # BLD AUTO: 1.7 10E3/UL (ref 0.8–5.3)
LYMPHOCYTES NFR BLD AUTO: 28 %
MCH RBC QN AUTO: 29.2 PG (ref 26.5–33)
MCHC RBC AUTO-ENTMCNC: 35.1 G/DL (ref 31.5–36.5)
MCV RBC AUTO: 83 FL (ref 78–100)
MONOCYTES # BLD AUTO: 0.6 10E3/UL (ref 0–1.3)
MONOCYTES NFR BLD AUTO: 10 %
NEUTROPHILS # BLD AUTO: 3.5 10E3/UL (ref 1.6–8.3)
NEUTROPHILS NFR BLD AUTO: 58 %
NRBC # BLD AUTO: 0 10E3/UL
NRBC BLD AUTO-RTO: 0 /100
PLATELET # BLD AUTO: 282 10E3/UL (ref 150–450)
PROT SERPL-MCNC: 6.6 G/DL (ref 6.8–8.8)
RBC # BLD AUTO: 4.65 10E6/UL (ref 4.4–5.9)
WBC # BLD AUTO: 6 10E3/UL (ref 4–11)

## 2021-11-23 PROCEDURE — 86140 C-REACTIVE PROTEIN: CPT | Performed by: INTERNAL MEDICINE

## 2021-11-23 PROCEDURE — 85652 RBC SED RATE AUTOMATED: CPT | Performed by: INTERNAL MEDICINE

## 2021-11-23 PROCEDURE — 82040 ASSAY OF SERUM ALBUMIN: CPT | Performed by: INTERNAL MEDICINE

## 2021-11-23 PROCEDURE — 85004 AUTOMATED DIFF WBC COUNT: CPT | Performed by: INTERNAL MEDICINE

## 2021-11-23 NOTE — PROGRESS NOTES
Skilled Nurse visit in the  patient home to administer Entyvio   No recent elevated temperature, fever, chills, productive cough, coughing for 3 weeks or longer or hemoptysis, abnormal vital signs, night sweats, chest pain. No  decrease in your appetite, unexplained weight loss or fatigue.  No other new onset medical symptoms.  Current weight 227 lbs .  PIV placed left AC, 1 attempt/s. Labs drawn CBCDP, hepatic panel, ESR, CRP.  Infusion completed without complication or reaction. Pt reports therapy is effective  in managing symptoms related to therapy.      Kelli Neves RN, BSN  [facility]  [phone]  [email or web address]

## 2021-11-30 DIAGNOSIS — Z11.59 ENCOUNTER FOR SCREENING FOR OTHER VIRAL DISEASES: ICD-10-CM

## 2021-12-15 ENCOUNTER — TELEPHONE (OUTPATIENT)
Dept: GASTROENTEROLOGY | Facility: CLINIC | Age: 41
End: 2021-12-15
Payer: COMMERCIAL

## 2021-12-15 NOTE — TELEPHONE ENCOUNTER
Pre assessment questions completed for upcoming flexible sigmoidoscopy procedure scheduled on 12.22.2021    COVID test scheduled 12.18.2021    Reviewed procedural arrival time 0630 and facility location [facility].    Designated  policy reviewed. Instructed to have someone stay 24 hours post procedure.     Anticoagulation/blood thinners? no    Electronic implanted devices? no    Reviewed flexible sigmoidoscopy prep instructions with patient.     Patient verbalized understanding and had no questions or concerns at this time.    Palmira Sarmiento RN

## 2021-12-19 ENCOUNTER — LAB (OUTPATIENT)
Dept: URGENT CARE | Facility: URGENT CARE | Age: 41
End: 2021-12-19
Attending: INTERNAL MEDICINE
Payer: COMMERCIAL

## 2021-12-19 DIAGNOSIS — Z11.59 ENCOUNTER FOR SCREENING FOR OTHER VIRAL DISEASES: ICD-10-CM

## 2021-12-19 PROCEDURE — U0005 INFEC AGEN DETEC AMPLI PROBE: HCPCS

## 2021-12-19 PROCEDURE — U0003 INFECTIOUS AGENT DETECTION BY NUCLEIC ACID (DNA OR RNA); SEVERE ACUTE RESPIRATORY SYNDROME CORONAVIRUS 2 (SARS-COV-2) (CORONAVIRUS DISEASE [COVID-19]), AMPLIFIED PROBE TECHNIQUE, MAKING USE OF HIGH THROUGHPUT TECHNOLOGIES AS DESCRIBED BY CMS-2020-01-R: HCPCS

## 2021-12-20 LAB — SARS-COV-2 RNA RESP QL NAA+PROBE: NEGATIVE

## 2021-12-21 ENCOUNTER — ANESTHESIA EVENT (OUTPATIENT)
Dept: SURGERY | Facility: AMBULATORY SURGERY CENTER | Age: 41
End: 2021-12-21
Payer: COMMERCIAL

## 2021-12-22 ENCOUNTER — HOSPITAL ENCOUNTER (OUTPATIENT)
Facility: AMBULATORY SURGERY CENTER | Age: 41
End: 2021-12-22
Attending: INTERNAL MEDICINE
Payer: COMMERCIAL

## 2021-12-22 ENCOUNTER — ANESTHESIA (OUTPATIENT)
Dept: SURGERY | Facility: AMBULATORY SURGERY CENTER | Age: 41
End: 2021-12-22
Payer: COMMERCIAL

## 2021-12-22 VITALS
OXYGEN SATURATION: 95 % | HEIGHT: 72 IN | BODY MASS INDEX: 30.75 KG/M2 | TEMPERATURE: 97 F | HEART RATE: 85 BPM | RESPIRATION RATE: 14 BRPM | SYSTOLIC BLOOD PRESSURE: 114 MMHG | WEIGHT: 227 LBS | DIASTOLIC BLOOD PRESSURE: 68 MMHG

## 2021-12-22 VITALS — HEART RATE: 83 BPM

## 2021-12-22 LAB — FLEXIBLE SIGMOIDOSCOPY: NORMAL

## 2021-12-22 PROCEDURE — 88305 TISSUE EXAM BY PATHOLOGIST: CPT | Mod: TC | Performed by: INTERNAL MEDICINE

## 2021-12-22 PROCEDURE — 88305 TISSUE EXAM BY PATHOLOGIST: CPT | Mod: 26 | Performed by: PATHOLOGY

## 2021-12-22 PROCEDURE — 45331 SIGMOIDOSCOPY AND BIOPSY: CPT

## 2021-12-22 RX ORDER — NALOXONE HYDROCHLORIDE 0.4 MG/ML
0.2 INJECTION, SOLUTION INTRAMUSCULAR; INTRAVENOUS; SUBCUTANEOUS
Status: DISCONTINUED | OUTPATIENT
Start: 2021-12-22 | End: 2021-12-23 | Stop reason: HOSPADM

## 2021-12-22 RX ORDER — SODIUM CHLORIDE, SODIUM LACTATE, POTASSIUM CHLORIDE, CALCIUM CHLORIDE 600; 310; 30; 20 MG/100ML; MG/100ML; MG/100ML; MG/100ML
INJECTION, SOLUTION INTRAVENOUS CONTINUOUS PRN
Status: DISCONTINUED | OUTPATIENT
Start: 2021-12-22 | End: 2021-12-22

## 2021-12-22 RX ORDER — ONDANSETRON 4 MG/1
4 TABLET, ORALLY DISINTEGRATING ORAL EVERY 6 HOURS PRN
Status: DISCONTINUED | OUTPATIENT
Start: 2021-12-22 | End: 2021-12-23 | Stop reason: HOSPADM

## 2021-12-22 RX ORDER — ONDANSETRON 2 MG/ML
4 INJECTION INTRAMUSCULAR; INTRAVENOUS EVERY 6 HOURS PRN
Status: DISCONTINUED | OUTPATIENT
Start: 2021-12-22 | End: 2021-12-23 | Stop reason: HOSPADM

## 2021-12-22 RX ORDER — NALOXONE HYDROCHLORIDE 0.4 MG/ML
0.4 INJECTION, SOLUTION INTRAMUSCULAR; INTRAVENOUS; SUBCUTANEOUS
Status: DISCONTINUED | OUTPATIENT
Start: 2021-12-22 | End: 2021-12-23 | Stop reason: HOSPADM

## 2021-12-22 RX ORDER — PROCHLORPERAZINE MALEATE 10 MG
10 TABLET ORAL EVERY 6 HOURS PRN
Status: DISCONTINUED | OUTPATIENT
Start: 2021-12-22 | End: 2021-12-23 | Stop reason: HOSPADM

## 2021-12-22 RX ORDER — PROPOFOL 10 MG/ML
INJECTION, EMULSION INTRAVENOUS PRN
Status: DISCONTINUED | OUTPATIENT
Start: 2021-12-22 | End: 2021-12-22

## 2021-12-22 RX ORDER — LIDOCAINE HYDROCHLORIDE 20 MG/ML
INJECTION, SOLUTION INFILTRATION; PERINEURAL PRN
Status: DISCONTINUED | OUTPATIENT
Start: 2021-12-22 | End: 2021-12-22

## 2021-12-22 RX ORDER — FLUMAZENIL 0.1 MG/ML
0.2 INJECTION, SOLUTION INTRAVENOUS
Status: ACTIVE | OUTPATIENT
Start: 2021-12-22 | End: 2021-12-22

## 2021-12-22 RX ORDER — SIMETHICONE
LIQUID (ML) MISCELLANEOUS PRN
Status: DISCONTINUED | OUTPATIENT
Start: 2021-12-22 | End: 2021-12-22 | Stop reason: HOSPADM

## 2021-12-22 RX ORDER — ONDANSETRON 2 MG/ML
4 INJECTION INTRAMUSCULAR; INTRAVENOUS
Status: DISCONTINUED | OUTPATIENT
Start: 2021-12-22 | End: 2021-12-22 | Stop reason: HOSPADM

## 2021-12-22 RX ORDER — LIDOCAINE 40 MG/G
CREAM TOPICAL
Status: DISCONTINUED | OUTPATIENT
Start: 2021-12-22 | End: 2021-12-22 | Stop reason: HOSPADM

## 2021-12-22 RX ADMIN — PROPOFOL 50 MG: 10 INJECTION, EMULSION INTRAVENOUS at 07:44

## 2021-12-22 RX ADMIN — PROPOFOL 50 MG: 10 INJECTION, EMULSION INTRAVENOUS at 07:41

## 2021-12-22 RX ADMIN — LIDOCAINE HYDROCHLORIDE 60 MG: 20 INJECTION, SOLUTION INFILTRATION; PERINEURAL at 07:38

## 2021-12-22 RX ADMIN — PROPOFOL 30 MG: 10 INJECTION, EMULSION INTRAVENOUS at 07:47

## 2021-12-22 RX ADMIN — PROPOFOL 50 MG: 10 INJECTION, EMULSION INTRAVENOUS at 07:39

## 2021-12-22 RX ADMIN — SODIUM CHLORIDE, SODIUM LACTATE, POTASSIUM CHLORIDE, CALCIUM CHLORIDE: 600; 310; 30; 20 INJECTION, SOLUTION INTRAVENOUS at 07:36

## 2021-12-22 ASSESSMENT — MIFFLIN-ST. JEOR: SCORE: 1972.67

## 2021-12-22 NOTE — H&P
Raymond Aguayo  [id number]  male  41 year old      Reason for procedure/surgery: follow up IBD    Patient Active Problem List   Diagnosis     Ulcerative colitis (H)       Past Surgical History:    Past Surgical History:   Procedure Laterality Date     COLONOSCOPY  5/2015     COLONOSCOPY N/A 1/4/2021    Procedure: COLONOSCOPY, WITH  BIOPSY;  Surgeon: Colton Valentin MD;  [place] [facility] [place]      TOOTH EXTRACTION W/FORCEP  2009       Past Medical History:   Past Medical History:   Diagnosis Date     Ulcerative colitis (H) 2015     Wrist fracture, right 1992    minor, sports       Social History:   Social History     Tobacco Use     Smoking status: Never Smoker     Smokeless tobacco: Never Used   Substance Use Topics     Alcohol use: Yes     Comment: 1/day, beer or scotch       Family History:   Family History   Problem Relation Age of Onset     Lung Cancer Paternal Grandfather         smoker, 55     Colon Cancer No family hx of      Crohn's Disease No family hx of      Irritable Bowel Syndrome No family hx of      Inflammatory Bowel Disease No family hx of      Ulcerative Colitis No family hx of        Allergies: No Known Allergies    Active Medications:   Current Outpatient Medications   Medication Sig Dispense Refill     acetaminophen (TYLENOL) 325 MG tablet Take 325-650 mg by mouth every 6 hours as needed for mild pain       Loratadine-Pseudoephedrine (CLARITIN-D 24 HOUR PO) Take 1 tablet by mouth daily        mesalamine (LIALDA) 1.2 g EC tablet Take 4 tablets (4,800 mg) by mouth daily (with breakfast) 360 tablet 1     multivitamin, therapeutic (THERA-VIT) TABS tablet Take 1 tablet by mouth daily         Systemic Review:   CONSTITUTIONAL: NEGATIVE for fever, chills, change in weight  ENT/MOUTH: NEGATIVE for ear, mouth and throat problems  RESP: NEGATIVE for significant cough or SOB  CV: NEGATIVE for chest pain, palpitations or peripheral edema    Physical Examination:   Vital Signs: /76   Pulse 86    Temp 97.6  F (36.4  C) (Temporal)   Resp 18   Ht 1.829 m (6')   Wt 103 kg (227 lb)   SpO2 98%   BMI 30.79 kg/m    GENERAL: healthy, alert and no distress  NECK: no adenopathy, no asymmetry, masses, or scars  RESP: lungs clear to auscultation - no rales, rhonchi or wheezes  CV: regular rate and rhythm, normal S1 S2, no S3 or S4, no murmur, click or rub, no peripheral edema and peripheral pulses strong  ABDOMEN: soft, nontender, no hepatosplenomegaly, no masses and bowel sounds normal  MS: no gross musculoskeletal defects noted, no edema    Plan: Appropriate to proceed as scheduled.      Colton Valentin MD  12/22/2021    PCP:  Pop Rodríguez

## 2021-12-22 NOTE — ANESTHESIA PREPROCEDURE EVALUATION
Anesthesia Pre-Procedure Evaluation    Patient: Raymond Aguayo   MRN: [id number] : 1980        Preoperative Diagnosis: Ulcerative pancolitis without complication (H) [K51.00]    Procedure : Procedure(s):  SIGMOIDOSCOPY, FLEXIBLE          Past Medical History:   Diagnosis Date     Ulcerative colitis (H)      Wrist fracture, right 1992    minor, sports      Past Surgical History:   Procedure Laterality Date     COLONOSCOPY  2015     COLONOSCOPY N/A 2021    Procedure: COLONOSCOPY, WITH  BIOPSY;  Surgeon: Colton Valentin MD;  [facility] [place]      TOOTH EXTRACTION W/FORCEP        No Known Allergies   Social History     Tobacco Use     Smoking status: Never Smoker     Smokeless tobacco: Never Used   Substance Use Topics     Alcohol use: Yes     Comment: 1/day, beer or scotch      Wt Readings from Last 1 Encounters:   21 103 kg (227 lb)        Anesthesia Evaluation   Pt has had prior anesthetic. Type: MAC.    No history of anesthetic complications       ROS/MED HX  ENT/Pulmonary:  - neg pulmonary ROS     Neurologic:  - neg neurologic ROS     Cardiovascular:  - neg cardiovascular ROS  (-) murmur   METS/Exercise Tolerance: >4 METS    Hematologic:  - neg hematologic  ROS     Musculoskeletal:  - neg musculoskeletal ROS     GI/Hepatic:     (+) Inflammatory bowel disease, bowel prep,     Renal/Genitourinary:  - neg Renal ROS     Endo:  - neg endo ROS     Psychiatric/Substance Use:  - neg psychiatric ROS     Infectious Disease:  - neg infectious disease ROS     Malignancy:  - neg malignancy ROS     Other:  - neg other ROS          Physical Exam    Airway        Mallampati: I   TM distance: > 3 FB   Neck ROM: full   Mouth opening: > 3 cm    Respiratory Devices and Support         Dental  no notable dental history         Cardiovascular   cardiovascular exam normal       Rhythm and rate: regular and normal (-) no murmur    Pulmonary   pulmonary exam normal        breath sounds clear to  auscultation           OUTSIDE LABS:  CBC:   Lab Results   Component Value Date    WBC 6.0 11/23/2021    WBC 5.4 08/04/2021    HGB 13.6 11/23/2021    HGB 14.4 08/04/2021    HCT 38.7 (L) 11/23/2021    HCT 40.9 08/04/2021     11/23/2021     08/04/2021     BMP:   Lab Results   Component Value Date     01/07/2021    POTASSIUM 4.2 01/07/2021    CHLORIDE 107 01/07/2021    CO2 32 01/07/2021    BUN 13 01/07/2021    CR 0.96 01/07/2021    CR 0.99 08/12/2015    GLC 99 01/07/2021     COAGS: No results found for: PTT, INR, FIBR  POC: No results found for: BGM, HCG, HCGS  HEPATIC:   Lab Results   Component Value Date    ALBUMIN 3.4 11/23/2021    PROTTOTAL 6.6 (L) 11/23/2021    ALT 55 11/23/2021    AST 29 11/23/2021    ALKPHOS 90 11/23/2021    BILITOTAL 0.4 11/23/2021     OTHER:   Lab Results   Component Value Date    EILEEN 9.1 01/07/2021    CRP <2.9 11/23/2021    SED 6 11/23/2021       Anesthesia Plan    ASA Status:  2   NPO Status:  NPO Appropriate    Anesthesia Type: MAC.     - Reason for MAC: immobility needed   Induction: Intravenous, Propofol.   Maintenance: TIVA.        Consents    Anesthesia Plan(s) and associated risks, benefits, and realistic alternatives discussed. Questions answered and patient/representative(s) expressed understanding.    - Discussed:     - Discussed with:  Patient      - Specific Concerns: conversion to general with associated airway management, possibility of awareness.     - Extended Intubation/Ventilatory Support Discussed: No.      - Patient is DNR/DNI Status: No    Use of blood products discussed: No .     Postoperative Care    Pain management: Multi-modal analgesia.   PONV prophylaxis: Ondansetron (or other 5HT-3), Background Propofol Infusion     Comments:           H&P reviewed: Unable to attach H&P to encounter due to EHR limitations. H&P Update: appropriate H&P reviewed, patient examined. No interval changes since H&P (within 30 days).         Nico Dolan MD

## 2021-12-22 NOTE — ANESTHESIA CARE TRANSFER NOTE
Patient: Raymond Aguayo    Procedure: Procedure(s):  SIGMOIDOSCOPY, FLEXIBLE WITH BIOPSY       Diagnosis: Ulcerative pancolitis without complication (H) [K51.00]  Diagnosis Additional Information: No value filed.    Anesthesia Type:   MAC     Note:    Oropharynx: spontaneously breathing  Level of Consciousness: awake  Oxygen Supplementation: room air    Independent Airway: airway patency satisfactory and stable  Dentition: dentition unchanged  Vital Signs Stable: post-procedure vital signs reviewed and stable  Report to RN Given: handoff report given  Patient transferred to: Phase II  Comments: Resps easy and regular. Report to PACU RN  Handoff Report: Identifed the Patient, Identified the Reponsible Provider, Reviewed the pertinent medical history, Discussed the surgical course, Reviewed Intra-OP anesthesia mangement and issues during anesthesia, Set expectations for post-procedure period and Allowed opportunity for questions and acknowledgement of understanding      Vitals:  Vitals Value Taken Time   /72 12/22/21 0755   Temp 36.3  C (97.4  F) 12/22/21 0755   Pulse 83 12/22/21 0755   Resp 16 12/22/21 0755   SpO2 95 % 12/22/21 0755       Electronically Signed By: TIMI VIERA CRNA  December 22, 2021  7:56 AM

## 2021-12-22 NOTE — ANESTHESIA POSTPROCEDURE EVALUATION
Patient: Raymond Aguayo    Procedure: Procedure(s):  SIGMOIDOSCOPY, FLEXIBLE WITH BIOPSY       Diagnosis:Ulcerative pancolitis without complication (H) [K51.00]  Diagnosis Additional Information: No value filed.    Anesthesia Type:  MAC    Note:  Disposition: Outpatient   Postop Pain Control: Uneventful            Sign Out: Well controlled pain   PONV: No   Neuro/Psych: Uneventful            Sign Out: Acceptable/Baseline neuro status   Airway/Respiratory: Uneventful            Sign Out: Acceptable/Baseline resp. status   CV/Hemodynamics: Uneventful            Sign Out: Acceptable CV status   Other NRE: NONE   DID A NON-ROUTINE EVENT OCCUR? No           Last vitals:  Vitals Value Taken Time   /68 12/22/21 0811   Temp 36.1  C (97  F) 12/22/21 0811   Pulse 85 12/22/21 0811   Resp 14 12/22/21 0811   SpO2 95 % 12/22/21 0755       Electronically Signed By: Nico Dolan MD  December 22, 2021  9:48 AM

## 2021-12-23 LAB
PATH REPORT.COMMENTS IMP SPEC: NORMAL
PATH REPORT.COMMENTS IMP SPEC: NORMAL
PATH REPORT.FINAL DX SPEC: NORMAL
PATH REPORT.GROSS SPEC: NORMAL
PATH REPORT.MICROSCOPIC SPEC OTHER STN: NORMAL
PATH REPORT.RELEVANT HX SPEC: NORMAL
PHOTO IMAGE: NORMAL

## 2022-01-18 ENCOUNTER — HOME INFUSION (PRE-WILLOW HOME INFUSION) (OUTPATIENT)
Dept: PHARMACY | Facility: CLINIC | Age: 42
End: 2022-01-18

## 2022-01-18 ENCOUNTER — DOCUMENTATION ONLY (OUTPATIENT)
Dept: PHARMACY | Facility: CLINIC | Age: 42
End: 2022-01-18
Payer: COMMERCIAL

## 2022-01-18 NOTE — PROGRESS NOTES
Skilled Nurse visit in the  patient home to administer Entyvio 300mg.  No recent elevated temperature, fever, chills, productive cough, coughing for 3 weeks or longer or hemoptysis, abnormal vital signs, night sweats, chest pain. No  decrease in your appetite, unexplained weight loss or fatigue.  No other new onset medical symptoms.  Current weight 227 lbs .  PIV placed left forearm, 2 attempt/s. Infusion completed without complication or reaction. Pt reports therapy is effective  in managing symptoms related to therapy.    Kelli Neves RN, BSN  [facility]  [phone]  [email or web address]

## 2022-02-08 ENCOUNTER — HOME INFUSION (PRE-WILLOW HOME INFUSION) (OUTPATIENT)
Dept: PHARMACY | Facility: CLINIC | Age: 42
End: 2022-02-08
Payer: COMMERCIAL

## 2022-02-18 NOTE — PROGRESS NOTES
This is a recent snapshot of the patient's [facility] medical record.  For current drug dose and complete information and questions, call [phone] or In Basket pool, [facility] ([id number])  CSN Number:  [id number]      
Imaging reviewed with the patient.  Endorses history of remote nasal bone injury.  Again refusing DV counselor or police report.  Conservative management discussed with the patient in detail.  Close PMD follow up encouraged.  Strict ED return instructions discussed in detail and patient given the opportunity to ask any questions about their discharge diagnosis and instructions

## 2022-02-19 ENCOUNTER — HEALTH MAINTENANCE LETTER (OUTPATIENT)
Age: 42
End: 2022-02-19

## 2022-03-07 ENCOUNTER — MEDICAL CORRESPONDENCE (OUTPATIENT)
Dept: HEALTH INFORMATION MANAGEMENT | Facility: CLINIC | Age: 42
End: 2022-03-07
Payer: COMMERCIAL

## 2022-03-07 ENCOUNTER — TRANSFERRED RECORDS (OUTPATIENT)
Dept: HEALTH INFORMATION MANAGEMENT | Facility: CLINIC | Age: 42
End: 2022-03-07
Payer: COMMERCIAL

## 2022-03-14 ENCOUNTER — TRANSCRIBE ORDERS (OUTPATIENT)
Dept: OTHER | Age: 42
End: 2022-03-14
Payer: COMMERCIAL

## 2022-03-14 DIAGNOSIS — M72.2 BILATERAL PLANTAR FASCIITIS: ICD-10-CM

## 2022-03-14 DIAGNOSIS — M21.42 PES PLANUS OF BOTH FEET: Primary | ICD-10-CM

## 2022-03-14 DIAGNOSIS — M21.41 PES PLANUS OF BOTH FEET: Primary | ICD-10-CM

## 2022-03-15 ENCOUNTER — LAB REQUISITION (OUTPATIENT)
Dept: LAB | Facility: CLINIC | Age: 42
End: 2022-03-15
Payer: COMMERCIAL

## 2022-03-15 ENCOUNTER — HOME INFUSION (PRE-WILLOW HOME INFUSION) (OUTPATIENT)
Dept: PHARMACY | Facility: CLINIC | Age: 42
End: 2022-03-15

## 2022-03-15 ENCOUNTER — DOCUMENTATION ONLY (OUTPATIENT)
Dept: PHARMACY | Facility: CLINIC | Age: 42
End: 2022-03-15

## 2022-03-15 DIAGNOSIS — K51.90 ULCERATIVE COLITIS, UNSPECIFIED, WITHOUT COMPLICATIONS (H): ICD-10-CM

## 2022-03-15 LAB
ALBUMIN SERPL-MCNC: 3.7 G/DL (ref 3.4–5)
ALP SERPL-CCNC: 78 U/L (ref 40–150)
ALT SERPL W P-5'-P-CCNC: 49 U/L (ref 0–70)
AST SERPL W P-5'-P-CCNC: 20 U/L (ref 0–45)
BASOPHILS # BLD AUTO: 0.1 10E3/UL (ref 0–0.2)
BASOPHILS NFR BLD AUTO: 1 %
BILIRUB DIRECT SERPL-MCNC: <0.1 MG/DL (ref 0–0.2)
BILIRUB SERPL-MCNC: 0.5 MG/DL (ref 0.2–1.3)
CRP SERPL-MCNC: <2.9 MG/L (ref 0–8)
EOSINOPHIL # BLD AUTO: 0.1 10E3/UL (ref 0–0.7)
EOSINOPHIL NFR BLD AUTO: 2 %
ERYTHROCYTE [DISTWIDTH] IN BLOOD BY AUTOMATED COUNT: 11.9 % (ref 10–15)
ERYTHROCYTE [SEDIMENTATION RATE] IN BLOOD BY WESTERGREN METHOD: 5 MM/HR (ref 0–15)
HCT VFR BLD AUTO: 40.5 % (ref 40–53)
HGB BLD-MCNC: 14.3 G/DL (ref 13.3–17.7)
IMM GRANULOCYTES # BLD: 0 10E3/UL
IMM GRANULOCYTES NFR BLD: 0 %
LYMPHOCYTES # BLD AUTO: 1.5 10E3/UL (ref 0.8–5.3)
LYMPHOCYTES NFR BLD AUTO: 25 %
MCH RBC QN AUTO: 29.4 PG (ref 26.5–33)
MCHC RBC AUTO-ENTMCNC: 35.3 G/DL (ref 31.5–36.5)
MCV RBC AUTO: 83 FL (ref 78–100)
MONOCYTES # BLD AUTO: 0.7 10E3/UL (ref 0–1.3)
MONOCYTES NFR BLD AUTO: 11 %
NEUTROPHILS # BLD AUTO: 3.5 10E3/UL (ref 1.6–8.3)
NEUTROPHILS NFR BLD AUTO: 61 %
NRBC # BLD AUTO: 0 10E3/UL
NRBC BLD AUTO-RTO: 0 /100
PLATELET # BLD AUTO: 255 10E3/UL (ref 150–450)
PROT SERPL-MCNC: 6.8 G/DL (ref 6.8–8.8)
RBC # BLD AUTO: 4.86 10E6/UL (ref 4.4–5.9)
WBC # BLD AUTO: 5.9 10E3/UL (ref 4–11)

## 2022-03-15 PROCEDURE — 86140 C-REACTIVE PROTEIN: CPT | Performed by: INTERNAL MEDICINE

## 2022-03-15 PROCEDURE — 85025 COMPLETE CBC W/AUTO DIFF WBC: CPT | Performed by: INTERNAL MEDICINE

## 2022-03-15 PROCEDURE — 85652 RBC SED RATE AUTOMATED: CPT | Performed by: INTERNAL MEDICINE

## 2022-03-15 PROCEDURE — 80076 HEPATIC FUNCTION PANEL: CPT | Performed by: INTERNAL MEDICINE

## 2022-03-15 NOTE — PROGRESS NOTES
Skilled Nurse visit in the  patient home to administer Entyvio 300mg.  No recent elevated temperature, fever, chills, productive cough, coughing for 3 weeks or longer or hemoptysis, abnormal vital signs, night sweats, chest pain. No  decrease in your appetite, unexplained weight loss or fatigue.  No other new onset medical symptoms.  Current weight 225 lbs.  PIV placed left AC, 1 attempt. Labs drawn CBCDP, ESR, CRP, hepatic panel. Infusion completed without complication or reaction. Pt reports therapy is effective in managing symptoms related to therapy.    Kelli Neves RN, BSN  [facility]  [phone]  [email or web address]

## 2022-03-15 NOTE — TELEPHONE ENCOUNTER
DIAGNOSIS: plantar fascitis /orthotic fitting/ dr hunter duenas   APPOINTMENT DATE: 3.17.22   NOTES STATUS DETAILS   OFFICE NOTE from referring provider Scanned 3.7.22 Cecily   MEDICATION LIST Internal

## 2022-03-16 NOTE — PROGRESS NOTES
This is a recent snapshot of the patient's [facility] medical record.  For current drug dose and complete information and questions, call [phone] or In Basket pool, [facility] home infusion ([id number])  CSN Number:  [id number]

## 2022-03-17 ENCOUNTER — OFFICE VISIT (OUTPATIENT)
Dept: ORTHOPEDICS | Facility: CLINIC | Age: 42
End: 2022-03-17
Payer: COMMERCIAL

## 2022-03-17 ENCOUNTER — PRE VISIT (OUTPATIENT)
Dept: ORTHOPEDICS | Facility: CLINIC | Age: 42
End: 2022-03-17

## 2022-03-17 VITALS — BODY MASS INDEX: 30.75 KG/M2 | WEIGHT: 227 LBS | HEIGHT: 72 IN

## 2022-03-17 DIAGNOSIS — K51.00 ULCERATIVE PANCOLITIS WITHOUT COMPLICATION (H): Primary | ICD-10-CM

## 2022-03-17 DIAGNOSIS — M72.2 BILATERAL PLANTAR FASCIITIS: ICD-10-CM

## 2022-03-17 DIAGNOSIS — M21.42 PES PLANUS OF BOTH FEET: ICD-10-CM

## 2022-03-17 DIAGNOSIS — M21.41 PES PLANUS OF BOTH FEET: ICD-10-CM

## 2022-03-17 PROCEDURE — 99203 OFFICE O/P NEW LOW 30 MIN: CPT | Performed by: FAMILY MEDICINE

## 2022-03-17 NOTE — LETTER
3/17/2022      RE: Raymond Aguayo  [address]  [facility] [place] [address]       Sports Medicine Clinic Visit    PCP: Pop Rodríguez    Raymond Aguayo is a 41 year old male who is seen  as self referral presenting with bilateral foot pain, primarily over heel and arches. He reports L > R.  He reports that last week he did have swelling over his left metatarsal heads.    Injury: None    Location of Pain: bilateral foot  Duration of Pain:  1year(s)  Rating of Pain: 4/10  Pain is better with: inserts for shoes and stretching.  Pain is worse with: worse in the morning or when he has been seated for an extended period of time.   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     He has found the most improvement recently from a pair of orthopedic supportive shoes.  He has used the night splint on the left consistently for months.  He has only used it intermittently on the right, as needed.  He does hamstring calf and heel cord stretches correctly, and does them daily.      Prior History of related problems: None    Ht 1.829 m (6')   Wt 103 kg (227 lb)   BMI 30.79 kg/m        History of ulcerative colitis, initial diagnosis 2014.  No surgeries.. Current medications: Vedolizumab 300 mg IV every 8 weeks and Lialda 4.8 g daily.  Most recent GI consult 11/21 reviewed by me.  Disease activity was felt to be in remission with possible breakthrough symptoms at the end of each infusion cycle.        PMH:  Past Medical History:   Diagnosis Date     Ulcerative colitis (H) 2015     Wrist fracture, right 1992    minor, sports       Active problem list:  Patient Active Problem List   Diagnosis     Ulcerative colitis (H)       FH:  Family History   Problem Relation Age of Onset     Lung Cancer Paternal Grandfather         smoker, 55     Colon Cancer No family hx of      Crohn's Disease No family hx of       Irritable Bowel Syndrome No family hx of      Inflammatory Bowel Disease No family hx of      Ulcerative Colitis No family hx of        SH:  Social History     Socioeconomic History     Marital status:      Spouse name: Not on file     Number of children: Not on file     Years of education: Not on file     Highest education level: Not on file   Occupational History     Not on file   Tobacco Use     Smoking status: Never Smoker     Smokeless tobacco: Never Used   Substance and Sexual Activity     Alcohol use: Yes     Comment: 1/day, beer or scotch     Drug use: No     Sexual activity: Not on file   Other Topics Concern     Not on file   Social History Narrative     Not on file     Social Determinants of Health     Financial Resource Strain: Not on file   Food Insecurity: Not on file   Transportation Needs: Not on file   Physical Activity: Not on file   Stress: Not on file   Social Connections: Not on file   Intimate Partner Violence: Not on file   Housing Stability: Not on file       MEDS:  See EMR, reviewed  ALL:  See EMR, reviewed    REVIEW OF SYSTEMS:  CONSTITUTIONAL:NEGATIVE for fever, chills, change in weight  INTEGUMENTARY/SKIN: NEGATIVE for worrisome rashes, moles or lesions  EYES: NEGATIVE for vision changes or irritation  ENT/MOUTH: NEGATIVE for ear, mouth and throat problems  RESP:NEGATIVE for significant cough or SOB  BREAST: NEGATIVE for masses, tenderness or discharge  CV: NEGATIVE for chest pain, palpitations or peripheral edema  GI: NEGATIVE for nausea, abdominal pain, heartburn, or change in bowel habits  :NEGATIVE for frequency, dysuria, or hematuria  :NEGATIVE for frequency, dysuria, or hematuria  NEURO: NEGATIVE for weakness, dizziness or paresthesias  ENDOCRINE: NEGATIVE for temperature intolerance, skin/hair changes  HEME/ALLERGY/IMMUNE: NEGATIVE for bleeding problems  PSYCHIATRIC: NEGATIVE for changes in mood or affect      Objective: Today he is nontender over the origin of the  bilateral plantar fascia although he says it has been consistently tender at this area in the past.  He is nontender over the Achilles tendon and nontender over the remainder of the base of the heel.  He is nontender along the course of the arch.  I do not palpate any synovial thickening at the MTP joints bilaterally although he says the left was visibly and palpably swollen a few weeks ago.  No redness noted.  No skin discoloration.  Normal range of motion at the bilateral great toes.    He has a slight tendency towards a pronated heel.  He does have shoe wear consistent with pronation.    Assessment history of bilateral plantar fasciitis.  History of ulcerative colitis    Plan: We discussed the relationship between UC, spondyloarthropathy and enthesitis of the Achilles tendon or plantar fascia.  He plans on discussing it with his GI doctor.  I am unsure whether it would change treatment.  We discussed options for maximizing the use of night splint, daily stretches, custom orthotic.  In the meantime he will get bilateral gel heel lifts.  We discussed options for injections although would like to avoid it for now.  He is hoping the combination of a set of custom orthotics and his new supportive shoes will be the most useful intervention.      Raphael Ward MD

## 2022-03-17 NOTE — PROGRESS NOTES
Sports Medicine Clinic Visit    PCP: Pop Rodríguez Dede is a 41 year old male who is seen  as self referral presenting with bilateral foot pain, primarily over heel and arches. He reports L > R.  He reports that last week he did have swelling over his left metatarsal heads.    Injury: None    Location of Pain: bilateral foot  Duration of Pain:  1year(s)  Rating of Pain: 4/10  Pain is better with: inserts for shoes and stretching.  Pain is worse with: worse in the morning or when he has been seated for an extended period of time.   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     He has found the most improvement recently from a pair of orthopedic supportive shoes.  He has used the night splint on the left consistently for months.  He has only used it intermittently on the right, as needed.  He does hamstring calf and heel cord stretches correctly, and does them daily.      Prior History of related problems: None    Ht 1.829 m (6')   Wt 103 kg (227 lb)   BMI 30.79 kg/m        History of ulcerative colitis, initial diagnosis 2014.  No surgeries.. Current medications: Vedolizumab 300 mg IV every 8 weeks and Lialda 4.8 g daily.  Most recent GI consult 11/21 reviewed by me.  Disease activity was felt to be in remission with possible breakthrough symptoms at the end of each infusion cycle.        PMH:  Past Medical History:   Diagnosis Date     Ulcerative colitis (H) 2015     Wrist fracture, right 1992    minor, sports       Active problem list:  Patient Active Problem List   Diagnosis     Ulcerative colitis (H)       FH:  Family History   Problem Relation Age of Onset     Lung Cancer Paternal Grandfather         smoker, 55     Colon Cancer No family hx of      Crohn's Disease No family hx of      Irritable Bowel Syndrome No family hx of      Inflammatory Bowel Disease No family hx of       Ulcerative Colitis No family hx of        SH:  Social History     Socioeconomic History     Marital status:      Spouse name: Not on file     Number of children: Not on file     Years of education: Not on file     Highest education level: Not on file   Occupational History     Not on file   Tobacco Use     Smoking status: Never Smoker     Smokeless tobacco: Never Used   Substance and Sexual Activity     Alcohol use: Yes     Comment: 1/day, beer or scotch     Drug use: No     Sexual activity: Not on file   Other Topics Concern     Not on file   Social History Narrative     Not on file     Social Determinants of Health     Financial Resource Strain: Not on file   Food Insecurity: Not on file   Transportation Needs: Not on file   Physical Activity: Not on file   Stress: Not on file   Social Connections: Not on file   Intimate Partner Violence: Not on file   Housing Stability: Not on file       MEDS:  See EMR, reviewed  ALL:  See EMR, reviewed    REVIEW OF SYSTEMS:  CONSTITUTIONAL:NEGATIVE for fever, chills, change in weight  INTEGUMENTARY/SKIN: NEGATIVE for worrisome rashes, moles or lesions  EYES: NEGATIVE for vision changes or irritation  ENT/MOUTH: NEGATIVE for ear, mouth and throat problems  RESP:NEGATIVE for significant cough or SOB  BREAST: NEGATIVE for masses, tenderness or discharge  CV: NEGATIVE for chest pain, palpitations or peripheral edema  GI: NEGATIVE for nausea, abdominal pain, heartburn, or change in bowel habits  :NEGATIVE for frequency, dysuria, or hematuria  :NEGATIVE for frequency, dysuria, or hematuria  NEURO: NEGATIVE for weakness, dizziness or paresthesias  ENDOCRINE: NEGATIVE for temperature intolerance, skin/hair changes  HEME/ALLERGY/IMMUNE: NEGATIVE for bleeding problems  PSYCHIATRIC: NEGATIVE for changes in mood or affect      Objective: Today he is nontender over the origin of the bilateral plantar fascia although he says it has been consistently tender at this area in the  past.  He is nontender over the Achilles tendon and nontender over the remainder of the base of the heel.  He is nontender along the course of the arch.  I do not palpate any synovial thickening at the MTP joints bilaterally although he says the left was visibly and palpably swollen a few weeks ago.  No redness noted.  No skin discoloration.  Normal range of motion at the bilateral great toes.    He has a slight tendency towards a pronated heel.  He does have shoe wear consistent with pronation.    Assessment history of bilateral plantar fasciitis.  History of ulcerative colitis    Plan: We discussed the relationship between UC, spondyloarthropathy and enthesitis of the Achilles tendon or plantar fascia.  He plans on discussing it with his GI doctor.  I am unsure whether it would change treatment.  We discussed options for maximizing the use of night splint, daily stretches, custom orthotic.  In the meantime he will get bilateral gel heel lifts.  We discussed options for injections although would like to avoid it for now.  He is hoping the combination of a set of custom orthotics and his new supportive shoes will be the most useful intervention.

## 2022-03-28 NOTE — PROGRESS NOTES
This is a recent snapshot of the patient's [facility] medical record.  For current drug dose and complete information and questions, call [phone] or In Basket pool, [facility] home infusion ([id number])  CSN Number:  [id number]

## 2022-03-28 NOTE — PROGRESS NOTES
This is a recent snapshot of the patient's [facility] medical record.  For current drug dose and complete information and questions, call [phone] or In Basket pool, fv home infusion ([id number])  CSN Number:  [id number]

## 2022-03-28 NOTE — PROGRESS NOTES
This is a recent snapshot of the patient's [facility] medical record.  For current drug dose and complete information and questions, call [phone] or In Basket pool, fv home infusion ([id number])  CSN Number:  [id number]

## 2022-03-30 ENCOUNTER — TELEPHONE (OUTPATIENT)
Dept: PHARMACY | Facility: CLINIC | Age: 42
End: 2022-03-30
Payer: COMMERCIAL

## 2022-03-30 NOTE — TELEPHONE ENCOUNTER
[facility] PRIOR AUTHORIZATION REQUEST     Drug including DOSE: ENTYVIO 300 mg IV Every 8 weeks  J Code:   NDC: [address]-49  ICD 10 code: K51.90    Date(s) of Service: 4/25/2022    Insurance Name: MEDICA  Insurance ID:[id number]    Provider: SIENA ECHEVARRIA MD  Provider NPI:[id number]      [facility]  NPI: [id number]

## 2022-03-31 NOTE — TELEPHONE ENCOUNTER
PA Initiation    Medication: ENTYVIO 300 mg IV Every 8 weeks  Insurance Company: [employer] - Phone [phone] Fax [phone]  Pharmacy Filling the Rx: [facility]  Filling Pharmacy Phone:    Filling Pharmacy Fax:    Start Date: 3/31/2022    Central Prior Authorization Team   Phone: [phone]

## 2022-03-31 NOTE — TELEPHONE ENCOUNTER
Prior Authorization Approval    Authorization Effective Date: 4/25/2022  Authorization Expiration Date: 10/21/2022  Medication: ENTYVIO 300 mg IV Every 8 weeks  Approved Dose/Quantity: 4  Reference #: [id number]   Insurance Company: [employer] - Phone [phone] Fax [phone]  Which Pharmacy is filling the prescription (Not needed for infusion/clinic administered): [facility]  Pharmacy Notified: Yes

## 2022-05-11 ENCOUNTER — HOME INFUSION (PRE-WILLOW HOME INFUSION) (OUTPATIENT)
Dept: PHARMACY | Facility: CLINIC | Age: 42
End: 2022-05-11

## 2022-05-11 ENCOUNTER — DOCUMENTATION ONLY (OUTPATIENT)
Dept: PHARMACY | Facility: CLINIC | Age: 42
End: 2022-05-11
Payer: COMMERCIAL

## 2022-05-11 NOTE — PROGRESS NOTES
Skilled Nurse visit in the Patient Home to administer Entyvio 300mg.  No recent elevated temperature, fever, chills, productive cough, coughing for 3 weeks or longer or hemoptysis, abnormal vital signs, night sweats, chest pain. No  decrease in your appetite, unexplained weight loss or fatigue.  No other new onset medical symptoms.  Current weight 227 lbs.  Peripheral IVleft AC, 1 attempt. Infusion completed without complication or reaction. Pt reports therapy iseffective in managing symptoms related to therapy.    Kelli Neves RN, BSN  [facility]  [phone]  [email or web address]

## 2022-06-22 NOTE — PROGRESS NOTES
This is a recent snapshot of the patient's [facility] medical record.  For current drug dose and complete information and questions, call [phone] or In Basket pool, fv home infusion ([id number])  CSN Number:  [id number]

## 2022-07-06 ENCOUNTER — DOCUMENTATION ONLY (OUTPATIENT)
Dept: PHARMACY | Facility: CLINIC | Age: 42
End: 2022-07-06

## 2022-07-06 ENCOUNTER — LAB REQUISITION (OUTPATIENT)
Dept: LAB | Facility: CLINIC | Age: 42
End: 2022-07-06
Payer: COMMERCIAL

## 2022-07-06 ENCOUNTER — HOME INFUSION (PRE-WILLOW HOME INFUSION) (OUTPATIENT)
Dept: PHARMACY | Facility: CLINIC | Age: 42
End: 2022-07-06

## 2022-07-06 DIAGNOSIS — K51.90 ULCERATIVE COLITIS, UNSPECIFIED, WITHOUT COMPLICATIONS (H): ICD-10-CM

## 2022-07-06 LAB
ALBUMIN SERPL-MCNC: 3.7 G/DL (ref 3.4–5)
ALP SERPL-CCNC: 80 U/L (ref 40–150)
ALT SERPL W P-5'-P-CCNC: 49 U/L (ref 0–70)
AST SERPL W P-5'-P-CCNC: 21 U/L (ref 0–45)
BASOPHILS # BLD AUTO: 0.1 10E3/UL (ref 0–0.2)
BASOPHILS NFR BLD AUTO: 1 %
BILIRUB DIRECT SERPL-MCNC: <0.1 MG/DL (ref 0–0.2)
BILIRUB SERPL-MCNC: 0.6 MG/DL (ref 0.2–1.3)
CRP SERPL-MCNC: 4.5 MG/L (ref 0–8)
EOSINOPHIL # BLD AUTO: 0.2 10E3/UL (ref 0–0.7)
EOSINOPHIL NFR BLD AUTO: 3 %
ERYTHROCYTE [DISTWIDTH] IN BLOOD BY AUTOMATED COUNT: 11.8 % (ref 10–15)
ERYTHROCYTE [SEDIMENTATION RATE] IN BLOOD BY WESTERGREN METHOD: 6 MM/HR (ref 0–15)
HCT VFR BLD AUTO: 43.6 % (ref 40–53)
HGB BLD-MCNC: 15.1 G/DL (ref 13.3–17.7)
IMM GRANULOCYTES # BLD: 0 10E3/UL
IMM GRANULOCYTES NFR BLD: 1 %
LYMPHOCYTES # BLD AUTO: 1.5 10E3/UL (ref 0.8–5.3)
LYMPHOCYTES NFR BLD AUTO: 24 %
MCH RBC QN AUTO: 29.5 PG (ref 26.5–33)
MCHC RBC AUTO-ENTMCNC: 34.6 G/DL (ref 31.5–36.5)
MCV RBC AUTO: 85 FL (ref 78–100)
MONOCYTES # BLD AUTO: 0.6 10E3/UL (ref 0–1.3)
MONOCYTES NFR BLD AUTO: 9 %
NEUTROPHILS # BLD AUTO: 4.1 10E3/UL (ref 1.6–8.3)
NEUTROPHILS NFR BLD AUTO: 62 %
NRBC # BLD AUTO: 0 10E3/UL
NRBC BLD AUTO-RTO: 0 /100
PLATELET # BLD AUTO: 277 10E3/UL (ref 150–450)
PROT SERPL-MCNC: 6.8 G/DL (ref 6.8–8.8)
RBC # BLD AUTO: 5.12 10E6/UL (ref 4.4–5.9)
WBC # BLD AUTO: 6.4 10E3/UL (ref 4–11)

## 2022-07-06 PROCEDURE — 86140 C-REACTIVE PROTEIN: CPT | Performed by: INTERNAL MEDICINE

## 2022-07-06 PROCEDURE — 80076 HEPATIC FUNCTION PANEL: CPT | Performed by: INTERNAL MEDICINE

## 2022-07-06 PROCEDURE — 85652 RBC SED RATE AUTOMATED: CPT | Performed by: INTERNAL MEDICINE

## 2022-07-06 PROCEDURE — 85025 COMPLETE CBC W/AUTO DIFF WBC: CPT | Performed by: INTERNAL MEDICINE

## 2022-07-06 NOTE — PROGRESS NOTES
Skilled Nurse visit in the Patient Home to administer Entyvio 300mg.  No recent elevated temperature, fever, chills, productive cough, coughing for 3 weeks or longer or hemoptysis, abnormal vital signs, night sweats, chest pain. No  decrease in your appetite, unexplained weight loss or fatigue.  No other new onset medical symptoms.  Current weight 227 lbs.  Peripheral IVleft AC, 1 attempt. Labs drawn CBC d/p, ESR, hepatic panel and CRP-inflammation. Infusion completed without complication or reaction. Pt reports therapy iseffective in managing symptoms related to therapy.    Kelli Neves RN, BSN  [facility]  [phone]  [email or web address]

## 2022-07-07 NOTE — PROGRESS NOTES
This is a recent snapshot of the patient's [facility] medical record.  For current drug dose and complete information and questions, call [phone] or In Basket pool, fv home infusion ([id number])  CSN Number:  [id number]

## 2022-08-29 ENCOUNTER — HOME INFUSION (PRE-WILLOW HOME INFUSION) (OUTPATIENT)
Dept: PHARMACY | Facility: CLINIC | Age: 42
End: 2022-08-29

## 2022-08-31 ENCOUNTER — HOME INFUSION (PRE-WILLOW HOME INFUSION) (OUTPATIENT)
Dept: PHARMACY | Facility: CLINIC | Age: 42
End: 2022-08-31

## 2022-10-22 ENCOUNTER — HEALTH MAINTENANCE LETTER (OUTPATIENT)
Age: 42
End: 2022-10-22

## 2022-10-27 ENCOUNTER — LAB REQUISITION (OUTPATIENT)
Dept: LAB | Facility: CLINIC | Age: 42
End: 2022-10-27
Payer: COMMERCIAL

## 2022-10-27 ENCOUNTER — DOCUMENTATION ONLY (OUTPATIENT)
Dept: PHARMACY | Facility: CLINIC | Age: 42
End: 2022-10-27

## 2022-10-27 DIAGNOSIS — K51.90 ULCERATIVE COLITIS, UNSPECIFIED, WITHOUT COMPLICATIONS (H): ICD-10-CM

## 2022-10-27 LAB
ALBUMIN SERPL-MCNC: 3.5 G/DL (ref 3.4–5)
ALP SERPL-CCNC: 125 U/L (ref 40–150)
ALT SERPL W P-5'-P-CCNC: 85 U/L (ref 0–70)
AST SERPL W P-5'-P-CCNC: 45 U/L (ref 0–45)
BASOPHILS # BLD AUTO: 0.1 10E3/UL (ref 0–0.2)
BASOPHILS NFR BLD AUTO: 1 %
BILIRUB DIRECT SERPL-MCNC: 0.1 MG/DL (ref 0–0.2)
BILIRUB SERPL-MCNC: 0.4 MG/DL (ref 0.2–1.3)
CRP SERPL-MCNC: 20.2 MG/L (ref 0–8)
EOSINOPHIL # BLD AUTO: 0.2 10E3/UL (ref 0–0.7)
EOSINOPHIL NFR BLD AUTO: 4 %
ERYTHROCYTE [DISTWIDTH] IN BLOOD BY AUTOMATED COUNT: 11.7 % (ref 10–15)
ERYTHROCYTE [SEDIMENTATION RATE] IN BLOOD BY WESTERGREN METHOD: 9 MM/HR (ref 0–15)
HCT VFR BLD AUTO: 40.8 % (ref 40–53)
HGB BLD-MCNC: 14.2 G/DL (ref 13.3–17.7)
IMM GRANULOCYTES # BLD: 0 10E3/UL
IMM GRANULOCYTES NFR BLD: 0 %
LYMPHOCYTES # BLD AUTO: 1.4 10E3/UL (ref 0.8–5.3)
LYMPHOCYTES NFR BLD AUTO: 34 %
MCH RBC QN AUTO: 29.8 PG (ref 26.5–33)
MCHC RBC AUTO-ENTMCNC: 34.8 G/DL (ref 31.5–36.5)
MCV RBC AUTO: 86 FL (ref 78–100)
MONOCYTES # BLD AUTO: 0.6 10E3/UL (ref 0–1.3)
MONOCYTES NFR BLD AUTO: 14 %
NEUTROPHILS # BLD AUTO: 1.9 10E3/UL (ref 1.6–8.3)
NEUTROPHILS NFR BLD AUTO: 47 %
NRBC # BLD AUTO: 0 10E3/UL
NRBC BLD AUTO-RTO: 0 /100
PLATELET # BLD AUTO: 282 10E3/UL (ref 150–450)
PROT SERPL-MCNC: 6.9 G/DL (ref 6.8–8.8)
RBC # BLD AUTO: 4.77 10E6/UL (ref 4.4–5.9)
WBC # BLD AUTO: 4.1 10E3/UL (ref 4–11)

## 2022-10-27 PROCEDURE — 85014 HEMATOCRIT: CPT | Performed by: INTERNAL MEDICINE

## 2022-10-27 PROCEDURE — 80076 HEPATIC FUNCTION PANEL: CPT | Performed by: INTERNAL MEDICINE

## 2022-10-27 PROCEDURE — 86140 C-REACTIVE PROTEIN: CPT | Performed by: INTERNAL MEDICINE

## 2022-10-27 PROCEDURE — 85652 RBC SED RATE AUTOMATED: CPT | Performed by: INTERNAL MEDICINE

## 2022-10-27 NOTE — PROGRESS NOTES
This is a recent snapshot of the patient's [facility] medical record.  For current drug dose and complete information and questions, call [phone] or In Basket pool, fv home infusion ([id number])  CSN Number:  [id number]

## 2022-10-27 NOTE — PROGRESS NOTES
Skilled Nurse visit in the Patient Home to administer Entyvio 300mg.  No recent elevated temperature, fever, chills, productive cough, coughing for 3 weeks or longer or hemoptysis, abnormal vital signs, night sweats, chest pain. No  decrease in your appetite, unexplained weight loss or fatigue.  No other new onset medical symptoms.  Current weight 225 lbs.  Peripheral IVleft AC, 1 attempt . Labs drawn CBCD, hepatic panel CRP, ESR. Infusion completed without complication or reaction. Pt reports therapy iseffective in managing symptoms related to therapy.    Kelli Neves RN, BSN  [facility]  [phone]  [email or web address]

## 2022-10-28 ENCOUNTER — MYC MEDICAL ADVICE (OUTPATIENT)
Dept: GASTROENTEROLOGY | Facility: CLINIC | Age: 42
End: 2022-10-28

## 2022-10-31 NOTE — TELEPHONE ENCOUNTER
Updated patient that the elevated CRP could be due to the influenza Vaccine. Also discussed Tumeric with pharmacist and advised patient to monitor amount of curcumin in supplement.

## 2022-12-06 ENCOUNTER — OFFICE VISIT (OUTPATIENT)
Dept: GASTROENTEROLOGY | Facility: CLINIC | Age: 42
End: 2022-12-06
Payer: COMMERCIAL

## 2022-12-06 ENCOUNTER — LAB (OUTPATIENT)
Dept: LAB | Facility: CLINIC | Age: 42
End: 2022-12-06
Payer: COMMERCIAL

## 2022-12-06 VITALS
OXYGEN SATURATION: 96 % | DIASTOLIC BLOOD PRESSURE: 81 MMHG | SYSTOLIC BLOOD PRESSURE: 134 MMHG | WEIGHT: 231.1 LBS | BODY MASS INDEX: 31.3 KG/M2 | HEIGHT: 72 IN

## 2022-12-06 DIAGNOSIS — R79.89 ABNORMAL LIVER FUNCTION TEST: ICD-10-CM

## 2022-12-06 DIAGNOSIS — K59.00 CONSTIPATION, UNSPECIFIED CONSTIPATION TYPE: ICD-10-CM

## 2022-12-06 DIAGNOSIS — K51.00 ULCERATIVE PANCOLITIS WITHOUT COMPLICATION (H): Primary | ICD-10-CM

## 2022-12-06 DIAGNOSIS — K51.00 ULCERATIVE PANCOLITIS WITHOUT COMPLICATION (H): ICD-10-CM

## 2022-12-06 LAB
ALBUMIN SERPL BCG-MCNC: 4.5 G/DL (ref 3.5–5.2)
ALP SERPL-CCNC: 104 U/L (ref 40–129)
ALT SERPL W P-5'-P-CCNC: 68 U/L (ref 10–50)
AST SERPL W P-5'-P-CCNC: 31 U/L (ref 10–50)
BILIRUB DIRECT SERPL-MCNC: <0.2 MG/DL (ref 0–0.3)
BILIRUB SERPL-MCNC: 0.3 MG/DL
CRP SERPL-MCNC: <3 MG/L
ERYTHROCYTE [SEDIMENTATION RATE] IN BLOOD BY WESTERGREN METHOD: 5 MM/HR (ref 0–15)
PROT SERPL-MCNC: 7 G/DL (ref 6.4–8.3)

## 2022-12-06 PROCEDURE — 80076 HEPATIC FUNCTION PANEL: CPT | Performed by: PATHOLOGY

## 2022-12-06 PROCEDURE — 99215 OFFICE O/P EST HI 40 MIN: CPT | Performed by: INTERNAL MEDICINE

## 2022-12-06 PROCEDURE — 85652 RBC SED RATE AUTOMATED: CPT | Performed by: PATHOLOGY

## 2022-12-06 PROCEDURE — 36415 COLL VENOUS BLD VENIPUNCTURE: CPT | Performed by: PATHOLOGY

## 2022-12-06 PROCEDURE — 86140 C-REACTIVE PROTEIN: CPT | Performed by: PATHOLOGY

## 2022-12-06 ASSESSMENT — PAIN SCALES - GENERAL: PAINLEVEL: NO PAIN (0)

## 2022-12-06 NOTE — PROGRESS NOTES
BD CLINIC VISIT     CC/REFERRING MD:  Referred Self    REASON FOR CONSULTATION: follow up UC    ASSESSMENT/PLAN    1. Ulcerative colitis:   Current medications: vedolizumab 300 mg q 8 weeks  Current clinical disease activity: remission  Last endoscopic disease activity: mucosal and histo healing 12/2021 (Frausto 0)    Doing great. No issues. Last lab check with CRP 20 and ALT 85. He reports a virus going around the family at that time. Will recheck labs and fecal calpro today. If fecal calpro elevated may consider adjusting vedolizumab.    -continue vedolizumab  -check CRP, ESR, hepatic panel  -check fecal calpro  -due for colonoscopy surveillance    2. IBD dysplasia surveillance:  Due 2023 (ordered)    3. Abnormal LFTs - ALT mildly elevated once. Recheck     4. Constipation - occasional. Add metamucil/citrucel 1 tablespoon daily. Miralax as needed    Return to clinic in 6 months with Alexandra Sawant and 12 months with Dr. Valentin    Thank you for this consultation.  It was a pleasure to participate in the care of this patient; please contact us with any further questions.  I spent a total of 40 minutes during the day of encounter performed chart review, meeting with patient, patient counseling, care coordination, and documentation.      This note was created with voice recognition software, and while reviewed for accuracy, typos may remain.     Colton Valentin MD  Inflammatory Bowel Disease Program   Division of Gastroenterology, Hepatology and Nutrition  [facility]        IBD HISTORY  Age at diagnosis: 2014  Extent of endoscopic disease: pan-colonic  Extent of histologic disease:  Pan coonic  Prior UC surgeries: none  Prior IBD Medications: Lialda    DRUG MONITORING  TPMT enzyme activity:     6-TGN/6-MMPN levels:    Biologic concentration:         DISEASE ASSESSMENT    Endoscopic assessment:   Most recent colonoscopy: 1/4/21:  Impression:          - Preparation of the colon was fair.                         - The examined portion of the ileum was normal.                        - Mild (Frausto Score 1) pancolitis ulcerative colitis.                        Biopsied.                        - The rectum is normal. Biopsied.                        - The examination was otherwise normal on direct and                        retroflexion views.                        - Consistent with mild pan-ulcerative colitis. Given                        rectal sparing Crohn's colitis can [place] considered but                        this is felt to be less likely. It is more likely that                        he has patchy healing of his UC on therapy.         Colon path:  A. CECUM AND ASCENDING COLON, BIOPSIES:   - Moderately active chronic colitis with cryptitis and crypt abscesses   (Analia grade 3)   - Negative for dysplasia     B. TRANSVERSE COLON, BIOPSIES:   - Moderately active chronic colitis with cryptitis and crypt abscesses   (Analia grade 3)   - Negative for dysplasia     C. DESCENDING AND SIGMOID COLON, BIOPSIES:   - Moderately active chronic colitis with cryptitis and crypt abscesses   (Analia grade 3)   - Negative for dysplasia     D. RECTUM, BIOPSIES:   - Mild focal active chronic colitis with rare cryptitis (Analia grade 2)   - Negative for dysplasia     Enterography: none  Fecal calprotectin: pending  C diff: none    HPI:   Currently, here for routine follow up. Doing well. No issues. Had a virus go through the house 1-2 months ago. Doing great now. No issues.    No diarrhea. No blood in stool. No abdominal pain. No tenesmus.     If anything he feels constipated occasionally. Takes miralax PRN    Frausto score:   Stool freq: 0 (baseline stools frequency)  Rectal bleedin (None)  PGA: 0 (normal)  Endoscopy: 0 (normal mucosa)    Extra intestinal manifestations of IBD:  No uveitis/episcleritis  No aphthous ulcers   No arthritis   No erythema nodosum/pyoderma gangrenosum.     sIBDQ:  No flowsheet data found.        ROS:    Constitutional, HEENT, cardiovascular, pulmonary, GI, , musculoskeletal, neuro, skin, endocrine and psych systems are negative, except as otherwise noted.    PHYSICAL EXAMINATION:  Constitutional: aaox3, cooperative, pleasant, not dyspneic/diaphoretic, no acute distress  Vitals reviewed: /81   Ht 1.829 m (6')   Wt 104.8 kg (231 lb 1.6 oz)   SpO2 96%   BMI 31.34 kg/m    Wt:   Wt Readings from Last 2 Encounters:   12/06/22 104.8 kg (231 lb 1.6 oz)   03/17/22 103 kg (227 lb)      Eyes: Sclera anicteric/injected  Ears/nose/mouth/throat: Normal oropharynx without ulcers or exudate, mucus membranes moist, hearing intact  Neck: supple, thyroid normal size  CV: No edema  Respiratory: Unlabored breathing  Lymph: No axillary, submandibular, supraclavicular or inguinal lymphadenopathy  Abd:  Nondistended, +bs, no hepatosplenomegaly, nontender, no peritoneal signs  Skin: warm, perfused, no jaundice  Psych: Normal affect  MSK: Normal gait    PERTINENT PAST MEDICAL HISTORY:  Past Medical History:   Diagnosis Date     Ulcerative colitis (H) 2015     Wrist fracture, right 1992    minor, sports       PREVIOUS SURGERIES:  Past Surgical History:   Procedure Laterality Date     COLONOSCOPY  5/2015     COLONOSCOPY N/A 1/4/2021    Procedure: COLONOSCOPY, WITH  BIOPSY;  Surgeon: Colton Valentin MD;  Location: [facility] OR      TOOTH EXTRACTION W/FORCEP  2009     SIGMOIDOSCOPY FLEXIBLE N/A 12/22/2021    Procedure: SIGMOIDOSCOPY, FLEXIBLE WITH BIOPSY;  Surgeon: Colton Valentin MD;  Location: [facility] OR       ALLERGIES:   No Known Allergies    PERTINENT MEDICATIONS:    Current Outpatient Medications:      acetaminophen (TYLENOL) 325 MG tablet, Take 325-650 mg by mouth every 6 hours as needed for mild pain, Disp: , Rfl:      Loratadine-Pseudoephedrine (CLARITIN-D 24 HOUR PO), Take 1 tablet by mouth daily , Disp: , Rfl:      multivitamin, therapeutic (THERA-VIT) TABS tablet, Take 1 tablet by mouth daily,  Disp: , Rfl:      mesalamine (LIALDA) 1.2 g EC tablet, Take 4 tablets (4,800 mg) by mouth daily (with breakfast) (Patient not taking: Reported on 12/6/2022), Disp: 360 tablet, Rfl: 1    SOCIAL HISTORY:  Social History     Socioeconomic History     Marital status:      Spouse name: Not on file     Number of children: Not on file     Years of education: Not on file     Highest education level: Not on file   Occupational History     Not on file   Tobacco Use     Smoking status: Never     Smokeless tobacco: Never   Substance and Sexual Activity     Alcohol use: Yes     Comment: 1/day, beer or scotch     Drug use: No     Sexual activity: Not on file   Other Topics Concern     Not on file   Social History Narrative     Not on file     Social Determinants of Health     Financial Resource Strain: Not on file   Food Insecurity: Not on file   Transportation Needs: Not on file   Physical Activity: Not on file   Stress: Not on file   Social Connections: Not on file   Intimate Partner Violence: Not on file   Housing Stability: Not on file       FAMILY HISTORY:  Family History   Problem Relation Age of Onset     Lung Cancer Paternal Grandfather         smoker, 55     Colon Cancer No family hx of      Crohn's Disease No family hx of      Irritable Bowel Syndrome No family hx of      Inflammatory Bowel Disease No family hx of      Ulcerative Colitis No family hx of        Past/family/social history reviewed and no changes

## 2022-12-06 NOTE — NURSING NOTE
Chief Complaint   Patient presents with     Follow Up       Vitals:    12/06/22 1424   BP: 134/81   SpO2: 96%   Weight: 104.8 kg (231 lb 1.6 oz)   Height: 1.829 m (6')       Body mass index is 31.34 kg/m .    Flores Henry MA

## 2022-12-06 NOTE — PATIENT INSTRUCTIONS
It is good to see you today. I am glad things are going so well! Keep up the good work.    Continue with vedolizumab infusions    Check labs and stool study today    My office will contact you to schedule the colonoscopy. This can be done in the next 3-4 months    Follow up with Chase Sawant in 6 months and Homero in 12 months    Call with questions

## 2022-12-06 NOTE — LETTER
12/6/2022         RE: Raymond Aguayo  [address]  [facility] [address]        Dear Colleague,    Thank you for referring your patient, Raymond Aguayo, to the [facility] [place]. Please see a copy of my visit note below.    BD CLINIC VISIT     CC/REFERRING MD:  Referred Self    REASON FOR CONSULTATION: follow up UC    ASSESSMENT/PLAN    1. Ulcerative colitis:   Current medications: vedolizumab 300 mg q 8 weeks  Current clinical disease activity: remission  Last endoscopic disease activity: mucosal and histo healing 12/2021 (Frausto 0)    Doing great. No issues. Last lab check with CRP 20 and ALT 85. He reports a virus going around the family at that time. Will recheck labs and fecal calpro today. If fecal calpro elevated may consider adjusting vedolizumab.    -continue vedolizumab  -check CRP, ESR, hepatic panel  -check fecal calpro  -due for colonoscopy surveillance    2. IBD dysplasia surveillance:  Due 2023 (ordered)    3. Abnormal LFTs - ALT mildly elevated once. Recheck     4. Constipation - occasional. Add metamucil/citrucel 1 tablespoon daily. Miralax as needed    Return to clinic in 6 months with Alexandra Sawant and 12 months with Dr. Valentin    Thank you for this consultation.  It was a pleasure to participate in the care of this patient; please contact us with any further questions.  I spent a total of 40 minutes during the day of encounter performed chart review, meeting with patient, patient counseling, care coordination, and documentation.      This note was created with voice recognition software, and while reviewed for accuracy, typos may remain.     Colton Valentin MD  Inflammatory Bowel Disease Program   Division of Gastroenterology, Hepatology and Nutrition  [facility]        IBD HISTORY  Age at diagnosis: 2014  Extent of endoscopic disease: pan-colonic  Extent of histologic disease:  Pan coonic  Prior UC surgeries: none  Prior IBD  Medications: Lialda    DRUG MONITORING  TPMT enzyme activity:     6-TGN/6-MMPN levels:    Biologic concentration:         DISEASE ASSESSMENT    Endoscopic assessment:   Most recent colonoscopy: 1/4/21:  Impression:          - Preparation of the colon was fair.                        - The examined portion of the ileum was normal.                        - Mild (Frausto Score 1) pancolitis ulcerative colitis.                        Biopsied.                        - The rectum is normal. Biopsied.                        - The examination was otherwise normal on direct and                        retroflexion views.                        - Consistent with mild pan-ulcerative colitis. Given                        rectal sparing Crohn's colitis can [place] considered but                        this is felt to be less likely. It is more likely that                        he has patchy healing of his UC on therapy.         Colon path:  A. CECUM AND ASCENDING COLON, BIOPSIES:   - Moderately active chronic colitis with cryptitis and crypt abscesses   (Analia grade 3)   - Negative for dysplasia     B. TRANSVERSE COLON, BIOPSIES:   - Moderately active chronic colitis with cryptitis and crypt abscesses   (Analia grade 3)   - Negative for dysplasia     C. DESCENDING AND SIGMOID COLON, BIOPSIES:   - Moderately active chronic colitis with cryptitis and crypt abscesses   (Analia grade 3)   - Negative for dysplasia     D. RECTUM, BIOPSIES:   - Mild focal active chronic colitis with rare cryptitis (Analia grade 2)   - Negative for dysplasia     Enterography: none  Fecal calprotectin: pending  C diff: none    HPI:   Currently, here for routine follow up. Doing well. No issues. Had a virus go through the house 1-2 months ago. Doing great now. No issues.    No diarrhea. No blood in stool. No abdominal pain. No tenesmus.     If anything he feels constipated occasionally. Takes miralax PRN    Frasuto score:   Stool freq: 0 (baseline stools  frequency)  Rectal bleedin (None)  PGA: 0 (normal)  Endoscopy: 0 (normal mucosa)    Extra intestinal manifestations of IBD:  No uveitis/episcleritis  No aphthous ulcers   No arthritis   No erythema nodosum/pyoderma gangrenosum.     sIBDQ:  No flowsheet data found.       ROS:    Constitutional, HEENT, cardiovascular, pulmonary, GI, , musculoskeletal, neuro, skin, endocrine and psych systems are negative, except as otherwise noted.    PHYSICAL EXAMINATION:  Constitutional: aaox3, cooperative, pleasant, not dyspneic/diaphoretic, no acute distress  Vitals reviewed: /81   Ht 1.829 m (6')   Wt 104.8 kg (231 lb 1.6 oz)   SpO2 96%   BMI 31.34 kg/m    Wt:   Wt Readings from Last 2 Encounters:   22 104.8 kg (231 lb 1.6 oz)   22 103 kg (227 lb)      Eyes: Sclera anicteric/injected  Ears/nose/mouth/throat: Normal oropharynx without ulcers or exudate, mucus membranes moist, hearing intact  Neck: supple, thyroid normal size  CV: No edema  Respiratory: Unlabored breathing  Lymph: No axillary, submandibular, supraclavicular or inguinal lymphadenopathy  Abd:  Nondistended, +bs, no hepatosplenomegaly, nontender, no peritoneal signs  Skin: warm, perfused, no jaundice  Psych: Normal affect  MSK: Normal gait    PERTINENT PAST MEDICAL HISTORY:  Past Medical History:   Diagnosis Date     Ulcerative colitis (H)      Wrist fracture, right 1992    minor, sports       PREVIOUS SURGERIES:  Past Surgical History:   Procedure Laterality Date     COLONOSCOPY  2015     COLONOSCOPY N/A 2021    Procedure: COLONOSCOPY, WITH  BIOPSY;  Surgeon: Colton Valentin MD;  Location: [facility] OR      TOOTH EXTRACTION W/FORCEP       SIGMOIDOSCOPY FLEXIBLE N/A 2021    Procedure: SIGMOIDOSCOPY, FLEXIBLE WITH BIOPSY;  Surgeon: Colton Valentin MD;  Location: [facility] OR       ALLERGIES:   No Known Allergies    PERTINENT MEDICATIONS:    Current Outpatient Medications:      acetaminophen (TYLENOL) 325 MG  tablet, Take 325-650 mg by mouth every 6 hours as needed for mild pain, Disp: , Rfl:      Loratadine-Pseudoephedrine (CLARITIN-D 24 HOUR PO), Take 1 tablet by mouth daily , Disp: , Rfl:      multivitamin, therapeutic (THERA-VIT) TABS tablet, Take 1 tablet by mouth daily, Disp: , Rfl:      mesalamine (LIALDA) 1.2 g EC tablet, Take 4 tablets (4,800 mg) by mouth daily (with breakfast) (Patient not taking: Reported on 12/6/2022), Disp: 360 tablet, Rfl: 1    SOCIAL HISTORY:  Social History     Socioeconomic History     Marital status:      Spouse name: Not on file     Number of children: Not on file     Years of education: Not on file     Highest education level: Not on file   Occupational History     Not on file   Tobacco Use     Smoking status: Never     Smokeless tobacco: Never   Substance and Sexual Activity     Alcohol use: Yes     Comment: 1/day, beer or scotch     Drug use: No     Sexual activity: Not on file   Other Topics Concern     Not on file   Social History Narrative     Not on file     Social Determinants of Health     Financial Resource Strain: Not on file   Food Insecurity: Not on file   Transportation Needs: Not on file   Physical Activity: Not on file   Stress: Not on file   Social Connections: Not on file   Intimate Partner Violence: Not on file   Housing Stability: Not on file       FAMILY HISTORY:  Family History   Problem Relation Age of Onset     Lung Cancer Paternal Grandfather         smoker, 55     Colon Cancer No family hx of      Crohn's Disease No family hx of      Irritable Bowel Syndrome No family hx of      Inflammatory Bowel Disease No family hx of      Ulcerative Colitis No family hx of        Past/family/social history reviewed and no changes        Again, thank you for allowing me to participate in the care of your patient.      Sincerely,    Colton Valentin MD

## 2022-12-22 ENCOUNTER — DOCUMENTATION ONLY (OUTPATIENT)
Dept: PHARMACY | Facility: CLINIC | Age: 42
End: 2022-12-22

## 2022-12-22 NOTE — PROGRESS NOTES
Skilled Nurse visit in the Patient Home to administer Entyvio 300mg.  No recent elevated temperature, fever, chills, productive cough, coughing for 3 weeks or longer or hemoptysis, abnormal vital signs, night sweats, chest pain. No  decrease in your appetite, unexplained weight loss or fatigue.  No other new onset medical symptoms.  Current weight 228 lbs.  Peripheral IVleft AC, 1 attempt  Infusion completed without complication or reaction. Pt reports therapy iseffective in managing symptoms related to therapy.    Kelli Neves RN, BSN  [facility]  [phone]  [email or web address]

## 2023-01-10 ENCOUNTER — HOSPITAL ENCOUNTER (OUTPATIENT)
Facility: AMBULATORY SURGERY CENTER | Age: 43
End: 2023-01-10
Attending: INTERNAL MEDICINE | Admitting: INTERNAL MEDICINE
Payer: COMMERCIAL

## 2023-01-10 ENCOUNTER — TELEPHONE (OUTPATIENT)
Dept: GASTROENTEROLOGY | Facility: CLINIC | Age: 43
End: 2023-01-10

## 2023-01-10 NOTE — TELEPHONE ENCOUNTER
Screening Questions  BLUE  KIND OF PREP RED  LOCATION [review exclusion criteria] GREEN  SEDATION TYPE        Y Are you active on mychart?       Colton Valentin MD    Ordering/Referring Provider?        MEDICA What type of coverage do you have?      N Have you had a positive covid test in the last 14 days?     30.8 1. BMI  [BMI 40+ - review exclusion criteria]    Y  2. Are you able to give consent for your medical care? [IF NO,RN REVIEW]          N  3. Are you taking any prescription pain medications on a routine schedule   (ex narcotics: tramadol, oxycodone, roxicodone, oxycontin,  and percocet)?          3a. EXTENDED PREP What kind of prescription?     N 4. Do you have any chemical dependencies such as alcohol, street drugs, or methadone?        **If yes 3- 5 , please schedule with MAC sedation.**          IF YES TO ANY 6 - 10 - HOSPITAL SETTING ONLY.     N 6.   Do you need assistance transferring?     N 7.   Have you had a heart or lung transplant?    N 8.   Are you currently on dialysis?   N 9.   Do you use daily home oxygen?   N 10. Do you take nitroglycerin?   10a.  If yes, how often?     11. [FEMALES]  N/A Are you currently pregnant?    11a.  If yes, how many weeks? [ Greater than 12 weeks, OR NEEDED]    N 12. Do you have Pulmonary Hypertension? *NEED PAC APPT AT [facility]*     N 13. [review exclusion criteria]  Do you have any implantable devices in your body (pacemaker, defib, LVAD)?    N 14. In the past 6 months, have you had any heart related issues including cardiomyopathy or heart attack?     14a.  If yes, did it require cardiac stenting if so when?     N 15. Have you had a stroke or Transient ischemic attack (TIA - aka  mini stroke ) within 6 months?      N 16. Do you have mod to severe Obstructive Sleep Apnea?  [Hospital only]    N 17. Do you have SEVERE AND UNCONTROLLED asthma? *NEED PAC APPT AT [facility]*     N 18. Are you currently taking any blood thinners?     18a. If yes, inform patient to  "\"follow up w/ ordering provider for bridging instructions.\"    N 19. Do you take the medication Phentermine?    19a. If yes, \"Hold for 7 days before procedure.  Please consult your prescribing provider if you have questions about holding this medication.\"     N  20. Do you have chronic kidney disease?      N  21. Do you have a diagnosis of diabetes?     N  22. On a regular basis do you go 3-5 days between bowel movements?      23. Preferred LOCAL Pharmacy for Pre Prescription    [ LIST ONLY ONE PHARMACY]       [facility] - [place], [place] - [address] [place] [address] [address]      - CLOSING REMINDERS -    Informed patient they will need an adult    Cannot take any type of public or medical transportation alone    Conscious Sedation- Needs  for 6 hours after the procedure       MAC/General-Needs  for 24 hours after procedure    Pre-Procedure Covid test to be completed [[facility] PCR Testing Required]    Confirmed Nurse will call to complete assessment       - SCHEDULING DETAILS -  NO Hospital Setting Required? If yes, what is the exclusion?:    WALE  Surgeon    4/4  Date of Procedure  Lower Endoscopy [Colonoscopy]  Type of Procedure Scheduled  [facility]   [facility]-If you answer yes to questions #8, #20, #21Which Colonoscopy Prep was Sent?     MAC, PER ORDER Sedation Type     N PAC / Pre-op Required                 "

## 2023-02-16 ENCOUNTER — LAB REQUISITION (OUTPATIENT)
Dept: LAB | Facility: CLINIC | Age: 43
End: 2023-02-16
Payer: COMMERCIAL

## 2023-02-16 ENCOUNTER — DOCUMENTATION ONLY (OUTPATIENT)
Dept: PHARMACY | Facility: CLINIC | Age: 43
End: 2023-02-16

## 2023-02-16 DIAGNOSIS — K51.90 ULCERATIVE COLITIS, UNSPECIFIED, WITHOUT COMPLICATIONS (H): ICD-10-CM

## 2023-02-16 LAB
ALBUMIN SERPL BCG-MCNC: 4.3 G/DL (ref 3.5–5.2)
ALP SERPL-CCNC: 94 U/L (ref 40–129)
ALT SERPL W P-5'-P-CCNC: 64 U/L (ref 10–50)
AST SERPL W P-5'-P-CCNC: 29 U/L (ref 10–50)
BASOPHILS # BLD AUTO: 0.1 10E3/UL (ref 0–0.2)
BASOPHILS NFR BLD AUTO: 1 %
BILIRUB DIRECT SERPL-MCNC: <0.2 MG/DL (ref 0–0.3)
BILIRUB SERPL-MCNC: 0.3 MG/DL
CRP SERPL-MCNC: <3 MG/L
EOSINOPHIL # BLD AUTO: 0.1 10E3/UL (ref 0–0.7)
EOSINOPHIL NFR BLD AUTO: 2 %
ERYTHROCYTE [DISTWIDTH] IN BLOOD BY AUTOMATED COUNT: 11.7 % (ref 10–15)
ERYTHROCYTE [SEDIMENTATION RATE] IN BLOOD BY WESTERGREN METHOD: 8 MM/HR (ref 0–15)
HCT VFR BLD AUTO: 43.8 % (ref 40–53)
HGB BLD-MCNC: 15.1 G/DL (ref 13.3–17.7)
IMM GRANULOCYTES # BLD: 0 10E3/UL
IMM GRANULOCYTES NFR BLD: 0 %
LYMPHOCYTES # BLD AUTO: 1.8 10E3/UL (ref 0.8–5.3)
LYMPHOCYTES NFR BLD AUTO: 31 %
MCH RBC QN AUTO: 29.7 PG (ref 26.5–33)
MCHC RBC AUTO-ENTMCNC: 34.5 G/DL (ref 31.5–36.5)
MCV RBC AUTO: 86 FL (ref 78–100)
MONOCYTES # BLD AUTO: 0.6 10E3/UL (ref 0–1.3)
MONOCYTES NFR BLD AUTO: 10 %
NEUTROPHILS # BLD AUTO: 3.3 10E3/UL (ref 1.6–8.3)
NEUTROPHILS NFR BLD AUTO: 56 %
NRBC # BLD AUTO: 0 10E3/UL
NRBC BLD AUTO-RTO: 0 /100
PLATELET # BLD AUTO: 276 10E3/UL (ref 150–450)
PROT SERPL-MCNC: 6.8 G/DL (ref 6.4–8.3)
RBC # BLD AUTO: 5.09 10E6/UL (ref 4.4–5.9)
WBC # BLD AUTO: 5.8 10E3/UL (ref 4–11)

## 2023-02-16 PROCEDURE — 85014 HEMATOCRIT: CPT | Performed by: INTERNAL MEDICINE

## 2023-02-16 PROCEDURE — 86140 C-REACTIVE PROTEIN: CPT | Performed by: INTERNAL MEDICINE

## 2023-02-16 PROCEDURE — 85652 RBC SED RATE AUTOMATED: CPT | Performed by: INTERNAL MEDICINE

## 2023-02-16 PROCEDURE — 80076 HEPATIC FUNCTION PANEL: CPT | Performed by: INTERNAL MEDICINE

## 2023-02-17 NOTE — PROGRESS NOTES
Skilled Nurse visit in the Patient Home to administer Entyvio 300mg.  No recent elevated temperature, fever, chills, productive cough, coughing for 3 weeks or longer or hemoptysis, abnormal vital signs, night sweats, chest pain. No  decrease in your appetite, unexplained weight loss or fatigue.  No other new onset medical symptoms.  Current weight 228 lbs.  Peripheral IVleft Lower Forearm, 2 attempts  Labs drawn CBC d/p, ESR, hepatic panel and CRP-inflammation. Infusion completed without complication or reaction. Pt reports therapy iseffective in managing symptoms related to therapy.    Kelli Neves RN, BSN  [facility]  [phone]  [email or web address]

## 2023-03-13 ENCOUNTER — TELEPHONE (OUTPATIENT)
Dept: GASTROENTEROLOGY | Facility: CLINIC | Age: 43
End: 2023-03-13
Payer: COMMERCIAL

## 2023-03-13 NOTE — TELEPHONE ENCOUNTER
LVM, sent mychart    Appt on 6/5/23 with Alexandra Sawant needs rescheduling due to the provider being unavailable. Reschedule with Alexandra or Jorge Fernandez (Return IBD). Next available appt. Left K pod #

## 2023-03-17 ENCOUNTER — TELEPHONE (OUTPATIENT)
Dept: GASTROENTEROLOGY | Facility: CLINIC | Age: 43
End: 2023-03-17

## 2023-03-17 DIAGNOSIS — K51.00 ULCERATIVE PANCOLITIS WITHOUT COMPLICATION (H): Primary | ICD-10-CM

## 2023-03-17 RX ORDER — BISACODYL 5 MG/1
TABLET, DELAYED RELEASE ORAL
Qty: 4 TABLET | Refills: 0 | Status: SHIPPED | OUTPATIENT
Start: 2023-03-17 | End: 2024-04-05

## 2023-03-17 NOTE — TELEPHONE ENCOUNTER
Attempted to contact patient regarding upcoming Colonoscopy  procedure on 4/4/23 for pre assessment questions. No answer.     Left message to return call to [phone] #4    Discuss Covid policy and designated  policy.    Pre op exam? N/A    Arrival time: 0800. Procedure time: 0900    [facility]; [address] [place] [address], [address] Floor, [place] [address]    Sedation type: MAC    Anticoagulants: No    Electronic implanted devices? No    Diabetic? No    Indication for procedure: Ulcerative colitis    Bowel prep recommendation: Extended prep Golytely  d/t poor prep in the past    Prep instructions sent via [employer]. Bowel prep script sent to     [facility] - [place], [place] - [address] [facility] [address]      Molly Bradford RN  Endoscopy Procedure Pre Assessment RN

## 2023-03-21 ENCOUNTER — TELEPHONE (OUTPATIENT)
Dept: GASTROENTEROLOGY | Facility: CLINIC | Age: 43
End: 2023-03-21
Payer: COMMERCIAL

## 2023-03-21 NOTE — TELEPHONE ENCOUNTER
Caller: Raymond Aguayo    Reason for Reschedule/Cancellation (please be detailed, any staff messages or encounters to note?): work conflict      Prior to reschedule please review:    Ordering Provider:WALE    Sedation per order:MAC    Does patient have any ASC Exclusions, please identify?: NO      Notes on Cancelled Procedure:    Procedure:Lower Endoscopy [Colonoscopy]     Date: 4/4    Location:[facility]; [address] [place] [address], [address] [place] [address]    Surgeon: WALE        Rescheduled: Yes    Procedure: Lower Endoscopy [Colonoscopy]     Date: 6/13    Location:[facility]; [address] [place] [address], [address] [place] [address]    Surgeon: WALE    Sedation Level Scheduled  MAC,  Reason for Sedation Level PER ORDER    Prep/Instructions updated and sent: YES

## 2023-04-01 ENCOUNTER — HEALTH MAINTENANCE LETTER (OUTPATIENT)
Age: 43
End: 2023-04-01

## 2023-05-30 NOTE — TELEPHONE ENCOUNTER
Pre assessment questions completed for upcoming Colonoscopy  procedure scheduled on 6.13.2023    COVID policy reviewed.     Pre-op exam? N/A    Reviewed procedural arrival time 0745, procedure time 0845 and facility location [facility]; [address] [place] [address], [address],  [id number]    Designated  policy reviewed. Instructed to have someone stay 24 hours post procedure.     NSAIDs? No    Anticoagulation/blood thinners? No    Electronic implanted devices? No    Diabetic? No    Procedure indication: UC    Bowel prep recommendation: Extended prep Mateusz per Dr. Valentin in 1.4.2021 colonoscopy report.    Reviewed procedure prep instructions.     Prep instructions sent via [employer].  Bowel prep script sent to [facility] - [place], [place] - [address] [place] [address] [address].     Patient verbalized understanding and had no questions or concerns at this time.    Palmira Sarmiento RN  Endoscopy Procedure Pre Assessment RN

## 2023-06-07 ENCOUNTER — LAB REQUISITION (OUTPATIENT)
Dept: LAB | Facility: CLINIC | Age: 43
End: 2023-06-07
Payer: COMMERCIAL

## 2023-06-07 DIAGNOSIS — K51.90 ULCERATIVE COLITIS, UNSPECIFIED, WITHOUT COMPLICATIONS (H): ICD-10-CM

## 2023-06-07 LAB
ALBUMIN SERPL BCG-MCNC: 4.1 G/DL (ref 3.5–5.2)
ALP SERPL-CCNC: 86 U/L (ref 40–129)
ALT SERPL W P-5'-P-CCNC: 50 U/L (ref 10–50)
AST SERPL W P-5'-P-CCNC: 24 U/L (ref 10–50)
BASOPHILS # BLD AUTO: 0 10E3/UL (ref 0–0.2)
BASOPHILS NFR BLD AUTO: 1 %
BILIRUB DIRECT SERPL-MCNC: <0.2 MG/DL (ref 0–0.3)
BILIRUB SERPL-MCNC: 0.5 MG/DL
CRP SERPL-MCNC: <3 MG/L
EOSINOPHIL # BLD AUTO: 0.1 10E3/UL (ref 0–0.7)
EOSINOPHIL NFR BLD AUTO: 3 %
ERYTHROCYTE [DISTWIDTH] IN BLOOD BY AUTOMATED COUNT: 11.6 % (ref 10–15)
ERYTHROCYTE [SEDIMENTATION RATE] IN BLOOD BY WESTERGREN METHOD: 2 MM/HR (ref 0–15)
HCT VFR BLD AUTO: 41.5 % (ref 40–53)
HGB BLD-MCNC: 14.5 G/DL (ref 13.3–17.7)
IMM GRANULOCYTES # BLD: 0 10E3/UL
IMM GRANULOCYTES NFR BLD: 0 %
LYMPHOCYTES # BLD AUTO: 1.3 10E3/UL (ref 0.8–5.3)
LYMPHOCYTES NFR BLD AUTO: 26 %
MCH RBC QN AUTO: 29.7 PG (ref 26.5–33)
MCHC RBC AUTO-ENTMCNC: 34.9 G/DL (ref 31.5–36.5)
MCV RBC AUTO: 85 FL (ref 78–100)
MONOCYTES # BLD AUTO: 0.4 10E3/UL (ref 0–1.3)
MONOCYTES NFR BLD AUTO: 8 %
NEUTROPHILS # BLD AUTO: 3 10E3/UL (ref 1.6–8.3)
NEUTROPHILS NFR BLD AUTO: 62 %
NRBC # BLD AUTO: 0 10E3/UL
NRBC BLD AUTO-RTO: 0 /100
PLATELET # BLD AUTO: 267 10E3/UL (ref 150–450)
PROT SERPL-MCNC: 6.5 G/DL (ref 6.4–8.3)
RBC # BLD AUTO: 4.89 10E6/UL (ref 4.4–5.9)
WBC # BLD AUTO: 4.9 10E3/UL (ref 4–11)

## 2023-06-07 PROCEDURE — 80076 HEPATIC FUNCTION PANEL: CPT | Performed by: INTERNAL MEDICINE

## 2023-06-07 PROCEDURE — 85025 COMPLETE CBC W/AUTO DIFF WBC: CPT | Performed by: INTERNAL MEDICINE

## 2023-06-07 PROCEDURE — 85652 RBC SED RATE AUTOMATED: CPT | Performed by: INTERNAL MEDICINE

## 2023-06-07 PROCEDURE — 86140 C-REACTIVE PROTEIN: CPT | Performed by: INTERNAL MEDICINE

## 2023-06-13 ENCOUNTER — ANESTHESIA (OUTPATIENT)
Dept: SURGERY | Facility: AMBULATORY SURGERY CENTER | Age: 43
End: 2023-06-13
Payer: COMMERCIAL

## 2023-06-13 ENCOUNTER — HOSPITAL ENCOUNTER (OUTPATIENT)
Facility: AMBULATORY SURGERY CENTER | Age: 43
Discharge: HOME | End: 2023-06-13
Attending: INTERNAL MEDICINE | Admitting: INTERNAL MEDICINE
Payer: COMMERCIAL

## 2023-06-13 ENCOUNTER — ANESTHESIA EVENT (OUTPATIENT)
Dept: SURGERY | Facility: AMBULATORY SURGERY CENTER | Age: 43
End: 2023-06-13
Payer: COMMERCIAL

## 2023-06-13 VITALS
RESPIRATION RATE: 14 BRPM | DIASTOLIC BLOOD PRESSURE: 74 MMHG | WEIGHT: 231 LBS | HEART RATE: 79 BPM | SYSTOLIC BLOOD PRESSURE: 121 MMHG | TEMPERATURE: 97.2 F | OXYGEN SATURATION: 96 % | HEIGHT: 72 IN | BODY MASS INDEX: 31.29 KG/M2

## 2023-06-13 VITALS — HEART RATE: 64 BPM

## 2023-06-13 LAB — COLONOSCOPY: NORMAL

## 2023-06-13 PROCEDURE — 45380 COLONOSCOPY AND BIOPSY: CPT | Mod: 59,33

## 2023-06-13 PROCEDURE — 88305 TISSUE EXAM BY PATHOLOGIST: CPT | Mod: 26 | Performed by: PATHOLOGY

## 2023-06-13 PROCEDURE — 45385 COLONOSCOPY W/LESION REMOVAL: CPT | Mod: 33

## 2023-06-13 PROCEDURE — 88305 TISSUE EXAM BY PATHOLOGIST: CPT | Mod: TC | Performed by: INTERNAL MEDICINE

## 2023-06-13 RX ORDER — ONDANSETRON 2 MG/ML
4 INJECTION INTRAMUSCULAR; INTRAVENOUS EVERY 6 HOURS PRN
Status: CANCELLED | OUTPATIENT
Start: 2023-06-13

## 2023-06-13 RX ORDER — NALOXONE HYDROCHLORIDE 0.4 MG/ML
0.2 INJECTION, SOLUTION INTRAMUSCULAR; INTRAVENOUS; SUBCUTANEOUS
Status: CANCELLED | OUTPATIENT
Start: 2023-06-13

## 2023-06-13 RX ORDER — FLUMAZENIL 0.1 MG/ML
0.2 INJECTION, SOLUTION INTRAVENOUS
Status: CANCELLED | OUTPATIENT
Start: 2023-06-13 | End: 2023-06-13

## 2023-06-13 RX ORDER — ONDANSETRON 2 MG/ML
4 INJECTION INTRAMUSCULAR; INTRAVENOUS
Status: DISCONTINUED | OUTPATIENT
Start: 2023-06-13 | End: 2023-06-14 | Stop reason: HOSPADM

## 2023-06-13 RX ORDER — NALOXONE HYDROCHLORIDE 0.4 MG/ML
0.4 INJECTION, SOLUTION INTRAMUSCULAR; INTRAVENOUS; SUBCUTANEOUS
Status: CANCELLED | OUTPATIENT
Start: 2023-06-13

## 2023-06-13 RX ORDER — PROPOFOL 10 MG/ML
INJECTION, EMULSION INTRAVENOUS CONTINUOUS PRN
Status: DISCONTINUED | OUTPATIENT
Start: 2023-06-13 | End: 2023-06-13

## 2023-06-13 RX ORDER — LIDOCAINE 40 MG/G
CREAM TOPICAL
Status: DISCONTINUED | OUTPATIENT
Start: 2023-06-13 | End: 2023-06-14 | Stop reason: HOSPADM

## 2023-06-13 RX ORDER — PROCHLORPERAZINE MALEATE 10 MG
10 TABLET ORAL EVERY 6 HOURS PRN
Status: CANCELLED | OUTPATIENT
Start: 2023-06-13

## 2023-06-13 RX ORDER — PROPOFOL 10 MG/ML
INJECTION, EMULSION INTRAVENOUS PRN
Status: DISCONTINUED | OUTPATIENT
Start: 2023-06-13 | End: 2023-06-13

## 2023-06-13 RX ORDER — ONDANSETRON 4 MG/1
4 TABLET, ORALLY DISINTEGRATING ORAL EVERY 6 HOURS PRN
Status: CANCELLED | OUTPATIENT
Start: 2023-06-13

## 2023-06-13 RX ORDER — LIDOCAINE HYDROCHLORIDE 20 MG/ML
INJECTION, SOLUTION INFILTRATION; PERINEURAL PRN
Status: DISCONTINUED | OUTPATIENT
Start: 2023-06-13 | End: 2023-06-13

## 2023-06-13 RX ORDER — SODIUM CHLORIDE, SODIUM LACTATE, POTASSIUM CHLORIDE, CALCIUM CHLORIDE 600; 310; 30; 20 MG/100ML; MG/100ML; MG/100ML; MG/100ML
INJECTION, SOLUTION INTRAVENOUS CONTINUOUS PRN
Status: DISCONTINUED | OUTPATIENT
Start: 2023-06-13 | End: 2023-06-13

## 2023-06-13 RX ADMIN — PROPOFOL 200 MCG/KG/MIN: 10 INJECTION, EMULSION INTRAVENOUS at 09:23

## 2023-06-13 RX ADMIN — LIDOCAINE HYDROCHLORIDE 100 MG: 20 INJECTION, SOLUTION INFILTRATION; PERINEURAL at 09:23

## 2023-06-13 RX ADMIN — PROPOFOL 100 MG: 10 INJECTION, EMULSION INTRAVENOUS at 09:23

## 2023-06-13 RX ADMIN — SODIUM CHLORIDE, SODIUM LACTATE, POTASSIUM CHLORIDE, CALCIUM CHLORIDE: 600; 310; 30; 20 INJECTION, SOLUTION INTRAVENOUS at 09:21

## 2023-06-13 NOTE — ANESTHESIA POSTPROCEDURE EVALUATION
Patient: Raymond Aguayo    Procedure: Procedure(s):  COLONOSCOPY, WITH POLYPECTOMY AND BIOPSY       Anesthesia Type:  MAC    Note:  Disposition: Outpatient   Postop Pain Control: Uneventful            Sign Out: Well controlled pain   PONV: No   Neuro/Psych: Uneventful            Sign Out: Acceptable/Baseline neuro status   Airway/Respiratory: Uneventful            Sign Out: Acceptable/Baseline resp. status   CV/Hemodynamics: Uneventful            Sign Out: Acceptable CV status   Other NRE: NONE   DID A NON-ROUTINE EVENT OCCUR? No           Last vitals:  Vitals Value Taken Time   /74 06/13/23 1030   Temp 36.2  C (97.2  F) 06/13/23 1030   Pulse 79 06/13/23 1030   Resp 14 06/13/23 1030   SpO2 96 % 06/13/23 1030       Electronically Signed By: Slim Gibbs MD  June 13, 2023  11:07 AM

## 2023-06-13 NOTE — ANESTHESIA CARE TRANSFER NOTE
Patient: Raymond Aguayo    Procedure: Procedure(s):  COLONOSCOPY, WITH POLYPECTOMY AND BIOPSY       Diagnosis: Ulcerative pancolitis without complication (H) [K51.00]  Diagnosis Additional Information: No value filed.    Anesthesia Type:   MAC     Note:    Oropharynx: oropharynx clear of all foreign objects and spontaneously breathing  Level of Consciousness: awake  Oxygen Supplementation: room air    Independent Airway: airway patency satisfactory and stable  Dentition: dentition unchanged  Vital Signs Stable: post-procedure vital signs reviewed and stable  Report to RN Given: handoff report given  Patient transferred to: Phase II    Handoff Report: Identifed the Patient, Identified the Reponsible Provider, Reviewed the pertinent medical history, Discussed the surgical course, Reviewed Intra-OP anesthesia mangement and issues during anesthesia, Set expectations for post-procedure period and Allowed opportunity for questions and acknowledgement of understanding      Vitals:  Vitals Value Taken Time   BP 99/61    Temp 36.1    Pulse 65    Resp 14    SpO2 95        Electronically Signed By: TIMI Severino CRNA  June 13, 2023  9:59 AM

## 2023-06-13 NOTE — DISCHARGE INSTRUCTIONS
[facility]  Home Care Following Anesthesia  For 24 hours after surgery:  Get plenty of rest.  A responsible adult must stay with you for at least 24 hours after you leave the surgery center.  Do not drive or use heavy equipment.  If you have weakness or tingling, don't drive or use heavy equipment until this feeling goes away.   Do not drink alcohol.   Avoid strenuous or risky activities.  Ask for help when climbing stairs.  You may feel lightheaded.  IF so, sit for a few minutes before standing.  Have someone help you get up.   If you have nausea (feel sick to your stomach): Drink only clear liquids such as apple juice, ginger ale, broth or 7-Up.  Rest may also help.  Be sure to drink enough fluids.  Move to a regular diet as you feel able.   You may have a slight fever.  Call the doctor if your fever is over 100 F (37.7 C) (taken under the tongue) or lasts longer than 24 hours.  You may have a dry mouth, a sore throat, muscle aches or trouble sleeping. These should go away after 24 hours.  Do not make important or legal decisions.   It is recommended to avoid smoking.               Tips for taking pain medications  To get the best pain relief possible, remember these points:  Take pain medications as directed, before pain becomes severe.  Pain medication can upset your stomach: taking it with food may help.  Constipation is a common side effect of pain medication. Drink plenty of  fluids.  Eat foods high in fiber. Take a stool softener if recommended by your doctor or pharmacist.  Do not drink alcohol, drive or operate machinery while taking pain medications.  Ask about other ways to control pain, such as with heat, ice or relaxation.    Tylenol/Acetaminophen Consumption  To help encourage the safe use of acetaminophen, the makers of TYLENOL  have lowered the maximum daily dose for single-ingredient Extra Strength TYLENOL  (acetaminophen) products sold in the [place] from 8 pills  per day (4,000 mg) to 6 pills per day (3,000 mg). The dosing interval has also changed from 2 pills every 4-6 hours to 2 pills every 6 hours.  If you feel your pain relief is insufficient, you may take Tylenol/Acetaminophen in addition to your narcotic pain medication.   Be careful not to exceed 3,000 mg of Tylenol/Acetaminophen in a 24 hour period from all sources.  If you are taking extra strength Tylenol/acetaminophen (500 mg), the maximum dose is 6 tablets in 24 hours.  If you are taking regular strength acetaminophen (325 mg), the maximum dose is 9 tablets in 24 hours.    Call a doctor for any of the following:  Signs of infection (fever, growing tenderness at the surgery site, a large amount of drainage or bleeding, severe pain, foul-smelling drainage, redness, swelling).  It has been over 8 to 10 hours since surgery and you are still not able to urinate (pass water).  Headache for over 24 hours.  Numbness, tingling or weakness the day after surgery (if you had spinal anesthesia).  Signs of Covid-19 infection (temperature over 100 degrees, shortness of breath, cough, loss of taste/smell, generalized body aches, persistent headache, chills, sore throat, nausea/vomiting/diarrhea)  Your doctor is: MD Demarcus Valentin

## 2023-06-13 NOTE — ANESTHESIA PREPROCEDURE EVALUATION
Anesthesia Pre-Procedure Evaluation    Patient: Raymond Aguayo   MRN: [id number] : 1980        Procedure : Procedure(s):  Colonoscopy          Past Medical History:   Diagnosis Date     Ulcerative colitis (H)      Wrist fracture, right 1992    minor, sports      Past Surgical History:   Procedure Laterality Date     COLONOSCOPY  2015     COLONOSCOPY N/A 2021    Procedure: COLONOSCOPY, WITH  BIOPSY;  Surgeon: Colton Valentin MD;  Location: [facility] [place]     HC TOOTH EXTRACTION W/FORCEP       SIGMOIDOSCOPY FLEXIBLE N/A 2021    Procedure: SIGMOIDOSCOPY, FLEXIBLE WITH BIOPSY;  Surgeon: Colton Valentin MD;  Location: [facility] OR      No Known Allergies   Social History     Tobacco Use     Smoking status: Never     Smokeless tobacco: Never   Vaping Use     Vaping status: Not on file   Substance Use Topics     Alcohol use: Yes     Comment: 1/day, beer or scotch      Wt Readings from Last 1 Encounters:   23 104.8 kg (231 lb)        Anesthesia Evaluation            ROS/MED HX  ENT/Pulmonary:       Neurologic:       Cardiovascular:       METS/Exercise Tolerance:     Hematologic:       Musculoskeletal:       GI/Hepatic:     (+) Inflammatory bowel disease,     Renal/Genitourinary:       Endo:       Psychiatric/Substance Use:       Infectious Disease:       Malignancy:       Other:            Physical Exam    Airway  airway exam normal      Mallampati: II   TM distance: > 3 FB   Neck ROM: full   Mouth opening: > 3 cm    Respiratory Devices and Support         Dental       (+) Completely normal teeth      Cardiovascular          Rhythm and rate: regular and normal     Pulmonary   pulmonary exam normal        breath sounds clear to auscultation           OUTSIDE LABS:  CBC:   Lab Results   Component Value Date    WBC 4.9 2023    WBC 5.8 2023    HGB 14.5 2023    HGB 15.1 2023    HCT 41.5 2023    HCT 43.8 2023     2023     2023      BMP:   Lab Results   Component Value Date     01/07/2021    POTASSIUM 4.2 01/07/2021    CHLORIDE 107 01/07/2021    CO2 32 01/07/2021    BUN 13 01/07/2021    CR 0.96 01/07/2021    CR 0.99 08/12/2015    GLC 99 01/07/2021     COAGS: No results found for: PTT, INR, FIBR  POC: No results found for: BGM, HCG, HCGS  HEPATIC:   Lab Results   Component Value Date    ALBUMIN 4.1 06/07/2023    PROTTOTAL 6.5 06/07/2023    ALT 50 06/07/2023    AST 24 06/07/2023    ALKPHOS 86 06/07/2023    BILITOTAL 0.5 06/07/2023     OTHER:   Lab Results   Component Value Date    EILEEN 9.1 01/07/2021    CRP 20.2 (H) 10/27/2022    SED 2 06/07/2023       Anesthesia Plan    ASA Status:  1   NPO Status:  NPO Appropriate    Anesthesia Type: MAC.     - Reason for MAC: immobility needed, straight local not clinically adequate   Induction: Intravenous.   Maintenance: TIVA.        Consents    Anesthesia Plan(s) and associated risks, benefits, and realistic alternatives discussed. Questions answered and patient/representative(s) expressed understanding.    - Discussed:     - Discussed with:  Patient      - Extended Intubation/Ventilatory Support Discussed: No.      - Patient is DNR/DNI Status: No    Use of blood products discussed: No .     Postoperative Care    Pain management: IV analgesics, Oral pain medications, Multi-modal analgesia.   PONV prophylaxis: Ondansetron (or other 5HT-3), Background Propofol Infusion     Comments:                Slim Gibbs MD

## 2023-06-13 NOTE — H&P
Raymond Aguayo  [id number]  male  42 year old      Reason for procedure/surgery: UC surveillance    Patient Active Problem List   Diagnosis     Ulcerative colitis (H)       Past Surgical History:    Past Surgical History:   Procedure Laterality Date     COLONOSCOPY  5/2015     COLONOSCOPY N/A 1/4/2021    Procedure: COLONOSCOPY, WITH  BIOPSY;  Surgeon: Colton Valentin MD;  [place] [facility] [place]      TOOTH EXTRACTION W/FORCEP  2009     SIGMOIDOSCOPY FLEXIBLE N/A 12/22/2021    Procedure: SIGMOIDOSCOPY, FLEXIBLE WITH BIOPSY;  Surgeon: Colton Valentin MD;  [place] [facility] [place]       Past Medical History:   Past Medical History:   Diagnosis Date     Ulcerative colitis (H) 2015     Wrist fracture, right 1992    minor, sports       Social History:   Social History     Tobacco Use     Smoking status: Never     Smokeless tobacco: Never   Vaping Use     Vaping status: Not on file   Substance Use Topics     Alcohol use: Yes     Comment: 1/day, beer or scotch       Family History:   Family History   Problem Relation Age of Onset     Lung Cancer Paternal Grandfather         smoker, 55     Colon Cancer No family hx of      Crohn's Disease No family hx of      Irritable Bowel Syndrome No family hx of      Inflammatory Bowel Disease No family hx of      Ulcerative Colitis No family hx of        Allergies: No Known Allergies    Active Medications:   Current Outpatient Medications   Medication Sig Dispense Refill     acetaminophen (TYLENOL) 325 MG tablet Take 325-650 mg by mouth every 6 hours as needed for mild pain       bisacodyl (DULCOLAX) 5 MG EC tablet 2 days prior to procedure, take 2 tablets at 4 pm. 1 day prior to procedure, take 2 tablets at 4 pm. For additional instructions refer to your colonoscopy prep instructions. 4 tablet 0     Loratadine-Pseudoephedrine (CLARITIN-D 24 HOUR PO) Take 1 tablet by mouth daily        multivitamin, therapeutic (THERA-VIT) TABS tablet Take 1 tablet by mouth daily        polyethylene glycol (GOLYTELY) 236 g suspension 2 days prior at 5pm, mix and drink half of a jug of Golytely. Drink an 8 oz. glass of Golytely every 15 minutes until half of the jug is gone. Place remainder of Golytely in the refrigerator. 1 day prior at 5 pm, drink the 2nd half of a jug of Golytely bowel prep. 6 hours before your check-in time, drink an 8 oz. glass of Golytely every 15 minutes until half of the 2nd jug of Golytely is gone. Discard remainder of second jug. 8000 mL 0       Systemic Review:   CONSTITUTIONAL: NEGATIVE for fever, chills, change in weight  ENT/MOUTH: NEGATIVE for ear, mouth and throat problems  RESP: NEGATIVE for significant cough or SOB  CV: NEGATIVE for chest pain, palpitations or peripheral edema    Physical Examination:   Vital Signs: /83 (BP Location: Left arm)   Pulse 71   Temp 97.8  F (36.6  C) (Temporal)   Resp 16   Ht 1.829 m (6')   Wt 104.8 kg (231 lb)   SpO2 96%   BMI 31.33 kg/m    GENERAL: healthy, alert and no distress  NECK: no adenopathy, no asymmetry, masses, or scars  RESP: lungs clear to auscultation - no rales, rhonchi or wheezes  CV: regular rate and rhythm, normal S1 S2, no S3 or S4, no murmur, click or rub, no peripheral edema and peripheral pulses strong  ABDOMEN: soft, nontender, no hepatosplenomegaly, no masses and bowel sounds normal  MS: no gross musculoskeletal defects noted, no edema    Plan: Appropriate to proceed as scheduled.      Colton Valentin MD  6/13/2023    PCP:  Pop Rodríguez

## 2023-06-13 NOTE — INTERVAL H&P NOTE
I have reviewed the surgical (or preoperative) H&P that is linked to this encounter, and examined the patient. There are no significant changes    Clinical Conditions Present on Arrival:  Clinically Significant Risk Factors Present on Admission                  # Obesity: Estimated body mass index is 31.33 kg/m  as calculated from the following:    Height as of this encounter: 1.829 m (6').    Weight as of this encounter: 104.8 kg (231 lb).

## 2023-08-15 ENCOUNTER — TELEPHONE (OUTPATIENT)
Dept: GASTROENTEROLOGY | Facility: CLINIC | Age: 43
End: 2023-08-15
Payer: COMMERCIAL

## 2023-09-28 ENCOUNTER — LAB REQUISITION (OUTPATIENT)
Dept: LAB | Facility: CLINIC | Age: 43
End: 2023-09-28
Payer: COMMERCIAL

## 2023-09-28 DIAGNOSIS — K51.90 ULCERATIVE COLITIS, UNSPECIFIED, WITHOUT COMPLICATIONS (H): ICD-10-CM

## 2023-09-28 LAB
ALBUMIN SERPL BCG-MCNC: 3.4 G/DL (ref 3.5–5.2)
ALP SERPL-CCNC: 65 U/L (ref 40–129)
ALT SERPL W P-5'-P-CCNC: 43 U/L (ref 0–70)
AST SERPL W P-5'-P-CCNC: 24 U/L (ref 0–45)
BASOPHILS # BLD AUTO: 0.1 10E3/UL (ref 0–0.2)
BASOPHILS NFR BLD AUTO: 1 %
BILIRUB DIRECT SERPL-MCNC: <0.2 MG/DL (ref 0–0.3)
BILIRUB SERPL-MCNC: 0.2 MG/DL
CRP SERPL-MCNC: <3 MG/L
EOSINOPHIL # BLD AUTO: 0.1 10E3/UL (ref 0–0.7)
EOSINOPHIL NFR BLD AUTO: 3 %
ERYTHROCYTE [DISTWIDTH] IN BLOOD BY AUTOMATED COUNT: 11.7 % (ref 10–15)
ERYTHROCYTE [SEDIMENTATION RATE] IN BLOOD BY WESTERGREN METHOD: 2 MM/HR (ref 0–15)
HCT VFR BLD AUTO: 42.7 % (ref 40–53)
HGB BLD-MCNC: 14.8 G/DL (ref 13.3–17.7)
IMM GRANULOCYTES # BLD: 0 10E3/UL
IMM GRANULOCYTES NFR BLD: 0 %
LYMPHOCYTES # BLD AUTO: 1.4 10E3/UL (ref 0.8–5.3)
LYMPHOCYTES NFR BLD AUTO: 25 %
MCH RBC QN AUTO: 29.6 PG (ref 26.5–33)
MCHC RBC AUTO-ENTMCNC: 34.7 G/DL (ref 31.5–36.5)
MCV RBC AUTO: 85 FL (ref 78–100)
MONOCYTES # BLD AUTO: 0.5 10E3/UL (ref 0–1.3)
MONOCYTES NFR BLD AUTO: 9 %
NEUTROPHILS # BLD AUTO: 3.5 10E3/UL (ref 1.6–8.3)
NEUTROPHILS NFR BLD AUTO: 62 %
NRBC # BLD AUTO: 0 10E3/UL
NRBC BLD AUTO-RTO: 0 /100
PLATELET # BLD AUTO: 283 10E3/UL (ref 150–450)
PROT SERPL-MCNC: 5.3 G/DL (ref 6.4–8.3)
RBC # BLD AUTO: 5 10E6/UL (ref 4.4–5.9)
WBC # BLD AUTO: 5.7 10E3/UL (ref 4–11)

## 2023-09-28 PROCEDURE — 85041 AUTOMATED RBC COUNT: CPT | Performed by: INTERNAL MEDICINE

## 2023-09-28 PROCEDURE — 86140 C-REACTIVE PROTEIN: CPT | Performed by: INTERNAL MEDICINE

## 2023-09-28 PROCEDURE — 80076 HEPATIC FUNCTION PANEL: CPT | Performed by: INTERNAL MEDICINE

## 2023-09-28 PROCEDURE — 85652 RBC SED RATE AUTOMATED: CPT | Performed by: INTERNAL MEDICINE

## 2023-11-21 ENCOUNTER — DOCUMENTATION ONLY (OUTPATIENT)
Dept: PHARMACY | Facility: CLINIC | Age: 43
End: 2023-11-21
Payer: COMMERCIAL

## 2023-11-22 NOTE — PROGRESS NOTES
Skilled Nurse visit in the Patient Home to administer Entyvio 300mg.  No recent elevated temperature, fever, chills, productive cough, coughing for 3 weeks or longer or hemoptysis, abnormal vital signs, night sweats, chest pain. No  decrease in your appetite, unexplained weight loss or fatigue.  No other new onset medical symptoms.  Current weight 230 lbs.  Peripheral IVleft AC, 1attempt Infusion completed without complication or reaction. Pt reports therapy iseffective in managing symptoms related to therapy.     Kelli Neves RN, BSN  [facility]  [phone]  [email or web address]

## 2024-01-16 ENCOUNTER — LAB REQUISITION (OUTPATIENT)
Dept: LAB | Facility: CLINIC | Age: 44
End: 2024-01-16
Payer: COMMERCIAL

## 2024-01-16 DIAGNOSIS — K51.90 ULCERATIVE COLITIS, UNSPECIFIED, WITHOUT COMPLICATIONS (H): ICD-10-CM

## 2024-01-16 LAB
ALBUMIN SERPL BCG-MCNC: 4.6 G/DL (ref 3.5–5.2)
ALP SERPL-CCNC: 101 U/L (ref 40–150)
ALT SERPL W P-5'-P-CCNC: 51 U/L (ref 0–70)
AST SERPL W P-5'-P-CCNC: 26 U/L (ref 0–45)
BASOPHILS # BLD AUTO: 0.1 10E3/UL (ref 0–0.2)
BASOPHILS NFR BLD AUTO: 1 %
BILIRUB DIRECT SERPL-MCNC: <0.2 MG/DL (ref 0–0.3)
BILIRUB SERPL-MCNC: 0.2 MG/DL
CRP SERPL-MCNC: <3 MG/L
EOSINOPHIL # BLD AUTO: 0.2 10E3/UL (ref 0–0.7)
EOSINOPHIL NFR BLD AUTO: 3 %
ERYTHROCYTE [DISTWIDTH] IN BLOOD BY AUTOMATED COUNT: 11.8 % (ref 10–15)
ERYTHROCYTE [SEDIMENTATION RATE] IN BLOOD BY WESTERGREN METHOD: 7 MM/HR (ref 0–15)
HCT VFR BLD AUTO: 45 % (ref 40–53)
HGB BLD-MCNC: 15.7 G/DL (ref 13.3–17.7)
IMM GRANULOCYTES # BLD: 0 10E3/UL
IMM GRANULOCYTES NFR BLD: 1 %
LYMPHOCYTES # BLD AUTO: 1.7 10E3/UL (ref 0.8–5.3)
LYMPHOCYTES NFR BLD AUTO: 26 %
MCH RBC QN AUTO: 29.7 PG (ref 26.5–33)
MCHC RBC AUTO-ENTMCNC: 34.9 G/DL (ref 31.5–36.5)
MCV RBC AUTO: 85 FL (ref 78–100)
MONOCYTES # BLD AUTO: 0.7 10E3/UL (ref 0–1.3)
MONOCYTES NFR BLD AUTO: 11 %
NEUTROPHILS # BLD AUTO: 3.9 10E3/UL (ref 1.6–8.3)
NEUTROPHILS NFR BLD AUTO: 58 %
NRBC # BLD AUTO: 0 10E3/UL
NRBC BLD AUTO-RTO: 0 /100
PLATELET # BLD AUTO: 290 10E3/UL (ref 150–450)
PROT SERPL-MCNC: 7.4 G/DL (ref 6.4–8.3)
RBC # BLD AUTO: 5.29 10E6/UL (ref 4.4–5.9)
WBC # BLD AUTO: 6.6 10E3/UL (ref 4–11)

## 2024-01-16 PROCEDURE — 85004 AUTOMATED DIFF WBC COUNT: CPT | Performed by: INTERNAL MEDICINE

## 2024-01-16 PROCEDURE — 86140 C-REACTIVE PROTEIN: CPT | Performed by: INTERNAL MEDICINE

## 2024-01-16 PROCEDURE — 80076 HEPATIC FUNCTION PANEL: CPT | Performed by: INTERNAL MEDICINE

## 2024-01-16 PROCEDURE — 85652 RBC SED RATE AUTOMATED: CPT | Performed by: INTERNAL MEDICINE

## 2024-01-18 ENCOUNTER — PATIENT OUTREACH (OUTPATIENT)
Dept: GASTROENTEROLOGY | Facility: CLINIC | Age: 44
End: 2024-01-18
Payer: COMMERCIAL

## 2024-01-18 DIAGNOSIS — K51.00 ULCERATIVE PANCOLITIS WITHOUT COMPLICATION (H): Primary | ICD-10-CM

## 2024-01-18 NOTE — TELEPHONE ENCOUNTER
Received a request to update Entyvio infusion orders.    Patient has not been seen since December 2022.    Mychart message sent to patient with next step instructions.    Inbasket message sent to [facility], requesting patient get in to see Alexandra Sawant as soon as possible.    TB Quantiferon Gold lab also overdue. Requested patient complete as soon as possible.

## 2024-01-19 NOTE — PROGRESS NOTES
This is a recent snapshot of the patient's [facility] medical record.  For current drug dose and complete information and questions, call [phone] or In Basket pool, [facility]v home infusion ([id number])  CSN Number:  [id number]

## 2024-01-23 ENCOUNTER — TELEPHONE (OUTPATIENT)
Dept: GASTROENTEROLOGY | Facility: CLINIC | Age: 44
End: 2024-01-23
Payer: COMMERCIAL

## 2024-01-23 NOTE — TELEPHONE ENCOUNTER
Writer received a message from [employer] to help get Pt scheduled for a follow up visit Alexandra Sawant. Writer called and left a message for the Pt, along with call back number.

## 2024-02-20 NOTE — TELEPHONE ENCOUNTER
received a message from [employer] to help get Pt scheduled for a follow up visit Alexandra Sawant. Writer called and left a message for the Pt, along with call back number.         Writer will send a message to update [employer]. Closing encounter.

## 2024-02-27 ENCOUNTER — TELEPHONE (OUTPATIENT)
Dept: GASTROENTEROLOGY | Facility: CLINIC | Age: 44
End: 2024-02-27
Payer: COMMERCIAL

## 2024-02-27 NOTE — TELEPHONE ENCOUNTER
Pt is returning Writer's phone call about scheduling for a sooner follow up appointment with Alexandra Sawant. Pt mentioned that there was an appointment scheduled for today, 2/27/2024 with GI. However, upon looking at Pt's appointments and MyChart messages with GI RN, there is no mentioning about an appointment on 2/27/2024. Writer offered Pt an appointment with Alexandra Sawant on 3/1/2024 at 8 AM, however the appointment date does not work for the Pt. 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 Writer informed Pt that Writer will connect with provider's RN to provide scheduling advise. Writer informed Pt that Writer will send out a MyChart message to the Pt once RN has a plan. Pt expressed understanding of the plan.

## 2024-02-28 NOTE — TELEPHONE ENCOUNTER
Writer called Pt back to help Pt get scheduled for an appointment. Pt accepted an appointment with Alexandra Sawant on 3/5/2024 at 11 AM for an in-person clinic visit.

## 2024-03-04 NOTE — PROGRESS NOTES
BD CLINIC VISIT     CC/REFERRING MD:  Referred Self    REASON FOR CONSULTATION: follow up UC    ASSESSMENT/PLAN    1. Ulcerative colitis:   Current medications: vedolizumab 300 mg q 8 weeks  Current clinical disease activity: pMayo1, intermitent rectal bleeding  Last endoscopic disease activity: mucosal and histo healing 6/2023 (Frausto 0)    Jose R is in endoscopic and histologic remission on colonoscopy 6/2023.  He reports bowel pattern remains stable, no urgency, frequency, tenesmus, though is having intermittent blood with wiping and in toilet water.  Unclear if bleeding related to underlying UC versus possible hemorrhoids.  He does have a history of intermittent constipation.  No pain to suggest fissure.  Recent colonoscopy within the last year without any signs of malignancy.  Patient declined rectal exam today.  We will check fecal calprotectin and labs with next infusion to assess for inflammation.  If negative most suspicious for underlying hemorrhoid, he does endorse intermittent anal itching.  We will continue to monitor and can suggest supportive cares for hemorrhoids pending ongoing symptoms.  May also benefit from soluble fiber powder to keep stools soft.  If the labs or fecal calprotectin suggest inflammation, consider adjusting vedolizumab.    -continue vedolizumab every 8 weeks  -check CRP, ESR, hepatic panel with next infusion, every other thereafter  -check fecal calpro  -referral to [facility] for HCM  -dermatology referral for annual skin exam with immunosuppression    2. IBD dysplasia surveillance: Last 6/2023 with 1 adenomatous polyp, recommended repeat in 2 years (2025)    3. Abnormal LFTs - ALT mildly elevated once. Recheck     4. Constipation - occasional. Add metamucil/citrucel 1 tablespoon daily. Miralax as needed    Return to clinic in 6 months with Dr. Valentin    Thank you for this consultation.  It was a pleasure to participate in the care of this patient; please contact us with any further  questions.  I spent a total of 55 minutes during the day of encounter performed chart review, meeting with patient, patient counseling, care coordination, and documentation.      This note was created with voice recognition software, and while reviewed for accuracy, typos may remain.       Alexandra Sawant PA-C  Division of Gastroenterology, Hepatology & Nutrition  [facility]          IBD HISTORY  Age at diagnosis: 2014  Extent of endoscopic disease: pan-colonic  Extent of histologic disease:  Pan coonic  Prior UC surgeries: none  Prior IBD Medications: Lialda    DRUG MONITORING  TPMT enzyme activity:     6-TGN/6-MMPN levels:    Biologic concentration:         DISEASE ASSESSMENT    Endoscopic assessment:   Most recent colonoscopy: 1/4/21:  Impression:          - Preparation of the colon was fair.                        - The examined portion of the ileum was normal.                        - Mild (Frausto Score 1) pancolitis ulcerative colitis.                        Biopsied.                        - The rectum is normal. Biopsied.                        - The examination was otherwise normal on direct and                        retroflexion views.                        - Consistent with mild pan-ulcerative colitis. Given                        rectal sparing Crohn's colitis can [place] considered but                        this is felt to be less likely. It is more likely that                        he has patchy healing of his UC on therapy.         Colon path:  A. CECUM AND ASCENDING COLON, BIOPSIES:   - Moderately active chronic colitis with cryptitis and crypt abscesses   (Analia grade 3)   - Negative for dysplasia     B. TRANSVERSE COLON, BIOPSIES:   - Moderately active chronic colitis with cryptitis and crypt abscesses   (Analia grade 3)   - Negative for dysplasia     C. DESCENDING AND SIGMOID COLON, BIOPSIES:   - Moderately active chronic colitis with cryptitis and crypt abscesses   (Analia grade 3)   -  Negative for dysplasia     D. RECTUM, BIOPSIES:   - Mild focal active chronic colitis with rare cryptitis (Analia grade 2)   - Negative for dysplasia     Enterography: none  Fecal calprotectin: pending  C diff: none    HPI:   Here today for routine follow up. Last seen by Dr. Valentin 2022.    He continues on Entyvio every 8 weeks, going well no issues.    Colonoscopy 2023 showed inactive Frausto 0 colitis. One small adenoma polyp.    Jose R reports that in the last 3 months he has had 5 instances of rectal bleeding, most recently 2 in the last week.  Prior to this had been.  He reports blood on toilet paper and in toilet water, unsure if on outside of stool, does not seem mixed into stool.  He does report that prior to 2-3 of the episodes he was more constipated with hard stool, last 2 episodes softer stool.  No pain with stool passage.  No known history of hemorrhoids.  Other than intermittent blood he reports symptoms have been very stable, typically has 3 bowel movements per day, stool can be hard to soft.  No significant straining but can spend significant time on toilet making sure he is empty.  No urgency.  No tenesmus.  No nocturnal stools.  No abdominal pain.  No bloating.  Instances of blood are not associated with timing of Entyvio, various times in infusion cycle.    Denies joint symptoms, skin symptoms, eye symptoms.  No oral ulcers.  No perianal symptoms.    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         sIBDQ:       No data to display                   ROS:    Constitutional, HEENT,  cardiovascular, pulmonary, GI, , musculoskeletal, neuro, skin, endocrine and psych systems are negative, except as otherwise noted.    PHYSICAL EXAMINATION:  Constitutional: aaox3, cooperative, pleasant, not dyspneic/diaphoretic, no acute distress  Vitals reviewed: There were no vitals taken for this visit.  Wt:   Wt Readings from Last 2 Encounters:   06/13/23 104.8 kg (231 lb)   12/06/22 104.8 kg (231 lb 1.6 oz)      Eyes: Sclera anicteric/injected  Ears/nose/mouth/throat: Normal oropharynx without ulcers or exudate, mucus membranes moist, hearing intact  Neck: supple, thyroid normal size  CV: No edema  Respiratory: Unlabored breathing  Lymph: No axillary, submandibular, supraclavicular or inguinal lymphadenopathy  Abd:  Nondistended, +bs, no hepatosplenomegaly, nontender, no peritoneal signs  Skin: warm, perfused, no jaundice  Psych: Normal affect  MSK: Normal gait    PERTINENT PAST MEDICAL HISTORY:  Past Medical History:   Diagnosis Date    Ulcerative colitis (H) 2015    Wrist fracture, right 1992    minor, sports       PREVIOUS SURGERIES:  Past Surgical History:   Procedure Laterality Date    COLONOSCOPY  5/2015    COLONOSCOPY N/A 1/4/2021    Procedure: COLONOSCOPY, WITH  BIOPSY;  Surgeon: Colton Valentin MD;  [place] [facility] [place]    COLONOSCOPY N/A 6/13/2023    Procedure: COLONOSCOPY, WITH POLYPECTOMY AND BIOPSY;  Surgeon: Colton Valentin MD;  Location: [facility] [place]     TOOTH EXTRACTION W/FORCEP  2009    SIGMOIDOSCOPY FLEXIBLE N/A 12/22/2021    Procedure: SIGMOIDOSCOPY, FLEXIBLE WITH BIOPSY;  Surgeon: Colton Valentin MD;  Location: [facility] [place]       ALLERGIES:   No Known Allergies    PERTINENT MEDICATIONS:    Current Outpatient Medications:     acetaminophen (TYLENOL) 325 MG tablet, Take 325-650 mg by mouth every 6 hours as needed for mild pain, Disp: , Rfl:     bisacodyl (DULCOLAX) 5 MG EC tablet, 2 days prior to procedure, take 2 tablets at 4 pm. 1 day prior to procedure, take 2 tablets  at 4 pm. For additional instructions refer to your colonoscopy prep instructions., Disp: 4 tablet, Rfl: 0    Loratadine-Pseudoephedrine (CLARITIN-D 24 HOUR PO), Take 1 tablet by mouth daily , Disp: , Rfl:     multivitamin, therapeutic (THERA-VIT) TABS tablet, Take 1 tablet by mouth daily, Disp: , Rfl:     polyethylene glycol (GOLYTELY) 236 g suspension, 2 days prior at 5pm, mix and drink half of a jug of Golytely. Drink an 8 oz. glass of Golytely every 15 minutes until half of the jug is gone. Place remainder of Golytely in the refrigerator. 1 day prior at 5 pm, drink the 2nd half of a jug of Golytely bowel prep. 6 hours before your check-in time, drink an 8 oz. glass of Golytely every 15 minutes until half of the 2nd jug of Golytely is gone. Discard remainder of second jug., Disp: 8000 mL, Rfl: 0    SOCIAL HISTORY:  Social History     Socioeconomic History    Marital status:      Spouse name: Not on file    Number of children: Not on file    Years of education: Not on file    Highest education level: Not on file   Occupational History    Not on file   Tobacco Use    Smoking status: Never    Smokeless tobacco: Never   Substance and Sexual Activity    Alcohol use: Yes     Comment: 1/day, beer or scotch    Drug use: No    Sexual activity: Not on file   Other Topics Concern    Not on file   Social History Narrative    Not on file     Social Determinants of Health     Financial Resource Strain: Not on file   Food Insecurity: Not on file   Transportation Needs: Not on file   Physical Activity: Not on file   Stress: Not on file   Social Connections: Not on file   Interpersonal Safety: Not on file   Housing Stability: Not on file       FAMILY HISTORY:  Family History   Problem Relation Age of Onset    Lung Cancer Paternal Grandfather         smoker, 55    Colon Cancer No family hx of     Crohn's Disease No family hx of     Irritable Bowel Syndrome No family hx of     Inflammatory Bowel Disease No family hx of      Ulcerative Colitis No family hx of        Past/family/social history reviewed and no changes

## 2024-03-05 ENCOUNTER — LAB (OUTPATIENT)
Dept: LAB | Facility: CLINIC | Age: 44
End: 2024-03-05
Payer: COMMERCIAL

## 2024-03-05 ENCOUNTER — MYC MEDICAL ADVICE (OUTPATIENT)
Dept: GASTROENTEROLOGY | Facility: CLINIC | Age: 44
End: 2024-03-05

## 2024-03-05 ENCOUNTER — OFFICE VISIT (OUTPATIENT)
Dept: GASTROENTEROLOGY | Facility: CLINIC | Age: 44
End: 2024-03-05
Payer: COMMERCIAL

## 2024-03-05 VITALS
HEIGHT: 72 IN | DIASTOLIC BLOOD PRESSURE: 79 MMHG | WEIGHT: 236 LBS | HEART RATE: 93 BPM | OXYGEN SATURATION: 96 % | BODY MASS INDEX: 31.97 KG/M2 | SYSTOLIC BLOOD PRESSURE: 125 MMHG

## 2024-03-05 DIAGNOSIS — K51.00 ULCERATIVE PANCOLITIS WITHOUT COMPLICATION (H): Primary | ICD-10-CM

## 2024-03-05 DIAGNOSIS — K51.00 ULCERATIVE PANCOLITIS WITHOUT COMPLICATION (H): ICD-10-CM

## 2024-03-05 PROCEDURE — 99215 OFFICE O/P EST HI 40 MIN: CPT | Performed by: DIETITIAN, REGISTERED

## 2024-03-05 PROCEDURE — 99417 PROLNG OP E/M EACH 15 MIN: CPT | Performed by: DIETITIAN, REGISTERED

## 2024-03-05 RX ORDER — ALBUTEROL SULFATE 90 UG/1
2 AEROSOL, METERED RESPIRATORY (INHALATION) EVERY 6 HOURS PRN
COMMUNITY
Start: 2024-02-27

## 2024-03-05 ASSESSMENT — PAIN SCALES - GENERAL: PAINLEVEL: NO PAIN (0)

## 2024-03-05 NOTE — TELEPHONE ENCOUNTER
Therapy plan orders ; plan has been updated. Patient remains on the same medication regimen of Entyvio 300mg Q8w. Standing lab orders renewed. Hepatitis B serologies from  indicate immunity. Quant Gold TB test due now; order placed. Last office visit was 3/5/24 with Alexandra Sawant PA-C. Next office visit is not yet scheduled; plan for follow-up with Dr. Valentin in 6 months. Patient receives infusions at [facility].

## 2024-03-05 NOTE — PATIENT INSTRUCTIONS
It was a pleasure taking care of you today.  I've included a brief summary of our discussion and care plan from today's visit below.  Please review this information with your primary care provider.  ______________________________________________________________________    My recommendations are summarized as follows:  -- Continue Entyvio every 8 weeks  -- Submit fecal calprotectin  -- Labs with next infusion and every other thereafter  -- Dermatology referral  -- MTM referral  -- try soluble fiber to keep stools soft, Metamucil or Citrucel 1 Tbs per day. Can also use Miralax as needed.        If you have any questions please do not hesitate to call my nurse coordinator, Palmira Vargas, at [phone].      -- please see scheduling information provided below     Return to GI Clinic in 6 months to review your progress.    ______________________________________________________________________    How do I schedule labs, imaging studies, or procedures that were ordered in clinic today?     Labs: To schedule lab appointment at the [facility], use my chart or call [phone]. If you have a [facility] lab closer to home where you are regularly seen you can give them a call.     Procedures: If a colonoscopy, upper endoscopy, breath test, esophageal manometry, or pH impedence was ordered today, our endoscopy team will call you to schedule this. If you have not heard from our endoscopy team within a week, please call [phone] to schedule.     Imaging Studies: If you were scheduled for a CT scan, X-ray, MRI, ultrasound, HIDA scan or other imaging study, please call [phone] to have this scheduled.     Referral: If a referral to another specialty was ordered, expect a phone call or follow instructions above. If you have not heard from anyone regarding your referral in a week, please call our clinic to check the status.     Who do I call with any questions after my visit?  Please be in touch if there  are any further questions that arise following today's visit.  There are multiple ways to contact your gastroenterology care team.      During business hours, you may reach a Gastroenterology nurse at [phone]    To schedule or reschedule an appointment, please call [phone].     You can always send a secure message through [email or web address].  [email or web address] messages are answered by your nurse or doctor typically within 24 hours.  Please allow extra time on weekends and holidays.      For urgent/emergent questions after business hours, you may reach the on-call GI Fellow by contacting the [facility]  at [phone].     How will I get the results of any tests ordered?    You will receive all of your results.  If you have signed up for [employer], any tests ordered at your visit will be available to you after your provider reviews them.  Typically this takes 1-2 weeks.  If there are urgent results that require a change in your care plan, your provider or nurse will call you to discuss the next steps.      What is [email or web address]?  [email or web address] is a secure way for you to access all of your healthcare records from the [facility].  It is a web based computer program, so you can sign on to it from any location.  It also allows you to send secure messages to your care team.  I recommend signing up for [email or web address] access if you have not already done so and are comfortable with using a computer.      How to I schedule a follow-up visit?  If you did not schedule a follow-up visit today, please call [phone] to schedule a follow-up office visit.      Sincerely,    Alexandra Sawant PA-C  Division of Gastroenterology, Hepatology & Nutrition  [facility]

## 2024-03-05 NOTE — NURSING NOTE
Chief Complaint   Patient presents with    Follow Up       Vitals:    03/05/24 1115   BP: 125/79   Pulse: 93   SpO2: 96%   Weight: 107 kg (236 lb)   Height: 1.829 m (6')       Body mass index is 32.01 kg/m .    Mary Austin

## 2024-03-05 NOTE — LETTER
3/5/2024         RE: Raymond Aguayo  [address]  [place] [facility] [address]        Dear Colleague,    Thank you for referring your patient, Raymond Aguayo, to the [facility] [place]. Please see a copy of my visit note below.    BD CLINIC VISIT     CC/REFERRING MD:  Referred Self    REASON FOR CONSULTATION: follow up UC    ASSESSMENT/PLAN    1. Ulcerative colitis:   Current medications: vedolizumab 300 mg q 8 weeks  Current clinical disease activity: pMayo1, intermitent rectal bleeding  Last endoscopic disease activity: mucosal and histo healing 6/2023 (Frausto 0)    Jose R is in endoscopic and histologic remission on colonoscopy 6/2023.  He reports bowel pattern remains stable, no urgency, frequency, tenesmus, though is having intermittent blood with wiping and in toilet water.  Unclear if bleeding related to underlying UC versus possible hemorrhoids.  He does have a history of intermittent constipation.  No pain to suggest fissure.  Recent colonoscopy within the last year without any signs of malignancy.  Patient declined rectal exam today.  We will check fecal calprotectin and labs with next infusion to assess for inflammation.  If negative most suspicious for underlying hemorrhoid, he does endorse intermittent anal itching.  We will continue to monitor and can suggest supportive cares for hemorrhoids pending ongoing symptoms.  May also benefit from soluble fiber powder to keep stools soft.  If the labs or fecal calprotectin suggest inflammation, consider adjusting vedolizumab.    -continue vedolizumab every 8 weeks  -check CRP, ESR, hepatic panel with next infusion, every other thereafter  -check fecal calpro  -referral to [facility] for HCM  -dermatology referral for annual skin exam with immunosuppression    2. IBD dysplasia surveillance: Last 6/2023 with 1 adenomatous polyp, recommended repeat in 2 years (2025)    3. Abnormal LFTs - ALT mildly elevated once. Recheck     4.  Constipation - occasional. Add metamucil/citrucel 1 tablespoon daily. Miralax as needed    Return to clinic in 6 months with Dr. Valentin    Thank you for this consultation.  It was a pleasure to participate in the care of this patient; please contact us with any further questions.  I spent a total of 55 minutes during the day of encounter performed chart review, meeting with patient, patient counseling, care coordination, and documentation.      This note was created with voice recognition software, and while reviewed for accuracy, typos may remain.       Alexandra Sawant PA-C  Division of Gastroenterology, Hepatology & Nutrition  [facility]          IBD HISTORY  Age at diagnosis: 2014  Extent of endoscopic disease: pan-colonic  Extent of histologic disease:  Pan coonic  Prior UC surgeries: none  Prior IBD Medications: Lialda    DRUG MONITORING  TPMT enzyme activity:     6-TGN/6-MMPN levels:    Biologic concentration:         DISEASE ASSESSMENT    Endoscopic assessment:   Most recent colonoscopy: 1/4/21:  Impression:          - Preparation of the colon was fair.                        - The examined portion of the ileum was normal.                        - Mild (Frausto Score 1) pancolitis ulcerative colitis.                        Biopsied.                        - The rectum is normal. Biopsied.                        - The examination was otherwise normal on direct and                        retroflexion views.                        - Consistent with mild pan-ulcerative colitis. Given                        rectal sparing Crohn's colitis can [place] considered but                        this is felt to be less likely. It is more likely that                        he has patchy healing of his UC on therapy.         Colon path:  A. CECUM AND ASCENDING COLON, BIOPSIES:   - Moderately active chronic colitis with cryptitis and crypt abscesses   (Analia grade 3)   - Negative for dysplasia     B. TRANSVERSE COLON,  BIOPSIES:   - Moderately active chronic colitis with cryptitis and crypt abscesses   (Analia grade 3)   - Negative for dysplasia     C. DESCENDING AND SIGMOID COLON, BIOPSIES:   - Moderately active chronic colitis with cryptitis and crypt abscesses   (Analia grade 3)   - Negative for dysplasia     D. RECTUM, BIOPSIES:   - Mild focal active chronic colitis with rare cryptitis (Analia grade 2)   - Negative for dysplasia     Enterography: none  Fecal calprotectin: pending  C diff: none    HPI:   Here today for routine follow up. Last seen by Dr. Valentin 2022.    He continues on Entyvio every 8 weeks, going well no issues.    Colonoscopy 2023 showed inactive Frausto 0 colitis. One small adenoma polyp.    Jose R reports that in the last 3 months he has had 5 instances of rectal bleeding, most recently 2 in the last week.  Prior to this had been.  He reports blood on toilet paper and in toilet water, unsure if on outside of stool, does not seem mixed into stool.  He does report that prior to 2-3 of the episodes he was more constipated with hard stool, last 2 episodes softer stool.  No pain with stool passage.  No known history of hemorrhoids.  Other than intermittent blood he reports symptoms have been very stable, typically has 3 bowel movements per day, stool can be hard to soft.  No significant straining but can spend significant time on toilet making sure he is empty.  No urgency.  No tenesmus.  No nocturnal stools.  No abdominal pain.  No bloating.  Instances of blood are not associated with timing of Entyvio, various times in infusion cycle.    Denies joint symptoms, skin symptoms, eye symptoms.  No oral ulcers.  No perianal symptoms.    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         sIBDQ:       No data to display                   ROS:    Constitutional, HEENT, cardiovascular, pulmonary, GI, , musculoskeletal, neuro, skin, endocrine and psych systems are negative, except as otherwise noted.    PHYSICAL EXAMINATION:  Constitutional: aaox3, cooperative, pleasant, not dyspneic/diaphoretic, no acute distress  Vitals reviewed: There were no vitals taken for this visit.  Wt:   Wt Readings from Last 2 Encounters:   06/13/23 104.8 kg (231 lb)   12/06/22 104.8 kg (231 lb 1.6 oz)      Eyes: Sclera anicteric/injected  Ears/nose/mouth/throat: Normal oropharynx without ulcers or exudate, mucus membranes moist, hearing intact  Neck: supple, thyroid normal size  CV: No edema  Respiratory: Unlabored breathing  Lymph: No axillary, submandibular, supraclavicular or inguinal lymphadenopathy  Abd:  Nondistended, +bs, no hepatosplenomegaly, nontender, no peritoneal signs  Skin: warm, perfused, no jaundice  Psych: Normal affect  MSK: Normal gait    PERTINENT PAST MEDICAL HISTORY:  Past Medical History:   Diagnosis Date     Ulcerative colitis (H) 2015     Wrist fracture, right 1992    minor, sports       PREVIOUS SURGERIES:  Past Surgical History:   Procedure Laterality Date     COLONOSCOPY  5/2015     COLONOSCOPY N/A 1/4/2021    Procedure: COLONOSCOPY, WITH  BIOPSY;  Surgeon: Colton Valentin MD;  [place] [facility] [place]     COLONOSCOPY N/A 6/13/2023    Procedure: COLONOSCOPY, WITH POLYPECTOMY AND BIOPSY;  Surgeon: Colton Valentin MD;  Location: [facility] [place]      TOOTH EXTRACTION W/FORCEP  2009     SIGMOIDOSCOPY FLEXIBLE N/A 12/22/2021    Procedure: SIGMOIDOSCOPY, FLEXIBLE WITH BIOPSY;  Surgeon: Colton Valentin MD;  Location: [facility] [place]       ALLERGIES:   No Known Allergies    PERTINENT MEDICATIONS:    Current  Outpatient Medications:      acetaminophen (TYLENOL) 325 MG tablet, Take 325-650 mg by mouth every 6 hours as needed for mild pain, Disp: , Rfl:      bisacodyl (DULCOLAX) 5 MG EC tablet, 2 days prior to procedure, take 2 tablets at 4 pm. 1 day prior to procedure, take 2 tablets at 4 pm. For additional instructions refer to your colonoscopy prep instructions., Disp: 4 tablet, Rfl: 0     Loratadine-Pseudoephedrine (CLARITIN-D 24 HOUR PO), Take 1 tablet by mouth daily , Disp: , Rfl:      multivitamin, therapeutic (THERA-VIT) TABS tablet, Take 1 tablet by mouth daily, Disp: , Rfl:      polyethylene glycol (GOLYTELY) 236 g suspension, 2 days prior at 5pm, mix and drink half of a jug of Golytely. Drink an 8 oz. glass of Golytely every 15 minutes until half of the jug is gone. Place remainder of Golytely in the refrigerator. 1 day prior at 5 pm, drink the 2nd half of a jug of Golytely bowel prep. 6 hours before your check-in time, drink an 8 oz. glass of Golytely every 15 minutes until half of the 2nd jug of Golytely is gone. Discard remainder of second jug., Disp: 8000 mL, Rfl: 0    SOCIAL HISTORY:  Social History     Socioeconomic History     Marital status:      Spouse name: Not on file     Number of children: Not on file     Years of education: Not on file     Highest education level: Not on file   Occupational History     Not on file   Tobacco Use     Smoking status: Never     Smokeless tobacco: Never   Substance and Sexual Activity     Alcohol use: Yes     Comment: 1/day, beer or scotch     Drug use: No     Sexual activity: Not on file   Other Topics Concern     Not on file   Social History Narrative     Not on file     Social Determinants of Health     Financial Resource Strain: Not on file   Food Insecurity: Not on file   Transportation Needs: Not on file   Physical Activity: Not on file   Stress: Not on file   Social Connections: Not on file   Interpersonal Safety: Not on file   Housing Stability: Not on  file       FAMILY HISTORY:  Family History   Problem Relation Age of Onset     Lung Cancer Paternal Grandfather         smoker, 55     Colon Cancer No family hx of      Crohn's Disease No family hx of      Irritable Bowel Syndrome No family hx of      Inflammatory Bowel Disease No family hx of      Ulcerative Colitis No family hx of        Past/family/social history reviewed and no changes      Again, thank you for allowing me to participate in the care of your patient.        Sincerely,        Alexandra Sawant PA-C

## 2024-03-06 ENCOUNTER — TELEPHONE (OUTPATIENT)
Dept: GASTROENTEROLOGY | Facility: CLINIC | Age: 44
End: 2024-03-06
Payer: COMMERCIAL

## 2024-03-06 NOTE — TELEPHONE ENCOUNTER
Left Voicemail (1st Attempt) for the patient to call back and schedule the following:    Appointment type: return   Provider: Dr. Valentin  Return date: Sept 2024  Specialty phone number: [phone]  Additional appointment(s) needed:   Additonal Notes:

## 2024-03-07 ENCOUNTER — TELEPHONE (OUTPATIENT)
Dept: GASTROENTEROLOGY | Facility: CLINIC | Age: 44
End: 2024-03-07
Payer: COMMERCIAL

## 2024-03-07 NOTE — TELEPHONE ENCOUNTER
MTM referral from: [facility] visit (referral by provider)    MTM referral outreach attempt #2 on March 7, 2024 at 3:22 PM      Outcome: Patient not reachable after several attempts, will route to [facility] Pharmacist/Provider as an FYI.  [facility] scheduling number is .  Thank you for the referral.    Use hbc for the carrier/Plan on the flowsheet      [employer] Message Sent    ALLISON Watson

## 2024-03-08 ENCOUNTER — TELEPHONE (OUTPATIENT)
Dept: GASTROENTEROLOGY | Facility: CLINIC | Age: 44
End: 2024-03-08
Payer: COMMERCIAL

## 2024-03-08 NOTE — TELEPHONE ENCOUNTER
Left Voicemail (2nd Attempt) for the patient to call back and schedule the following:    Appointment type: return   Provider: Dr. Valentin  Return date: Sept 2024  Specialty phone number: [phone]  Additional appointment(s) needed:   Additonal Notes:

## 2024-03-11 ENCOUNTER — TELEPHONE (OUTPATIENT)
Dept: GASTROENTEROLOGY | Facility: CLINIC | Age: 44
End: 2024-03-11
Payer: COMMERCIAL

## 2024-03-14 ENCOUNTER — LAB REQUISITION (OUTPATIENT)
Dept: LAB | Facility: CLINIC | Age: 44
End: 2024-03-14
Payer: COMMERCIAL

## 2024-03-14 ENCOUNTER — DOCUMENTATION ONLY (OUTPATIENT)
Dept: PHARMACY | Facility: CLINIC | Age: 44
End: 2024-03-14

## 2024-03-14 DIAGNOSIS — K51.90 ULCERATIVE COLITIS, UNSPECIFIED, WITHOUT COMPLICATIONS (H): ICD-10-CM

## 2024-03-14 LAB
ALBUMIN SERPL BCG-MCNC: 4.3 G/DL (ref 3.5–5.2)
ALP SERPL-CCNC: 97 U/L (ref 40–150)
ALT SERPL W P-5'-P-CCNC: 52 U/L (ref 0–70)
AST SERPL W P-5'-P-CCNC: 26 U/L (ref 0–45)
BASOPHILS # BLD AUTO: 0.1 10E3/UL (ref 0–0.2)
BASOPHILS NFR BLD AUTO: 1 %
BILIRUB DIRECT SERPL-MCNC: <0.2 MG/DL (ref 0–0.3)
BILIRUB SERPL-MCNC: 0.3 MG/DL
CRP SERPL-MCNC: <3 MG/L
EOSINOPHIL # BLD AUTO: 0.2 10E3/UL (ref 0–0.7)
EOSINOPHIL NFR BLD AUTO: 3 %
ERYTHROCYTE [DISTWIDTH] IN BLOOD BY AUTOMATED COUNT: 11.8 % (ref 10–15)
HCT VFR BLD AUTO: 40.5 % (ref 40–53)
HGB BLD-MCNC: 14.1 G/DL (ref 13.3–17.7)
IMM GRANULOCYTES # BLD: 0 10E3/UL
IMM GRANULOCYTES NFR BLD: 0 %
LYMPHOCYTES # BLD AUTO: 1.7 10E3/UL (ref 0.8–5.3)
LYMPHOCYTES NFR BLD AUTO: 29 %
MCH RBC QN AUTO: 29.3 PG (ref 26.5–33)
MCHC RBC AUTO-ENTMCNC: 34.8 G/DL (ref 31.5–36.5)
MCV RBC AUTO: 84 FL (ref 78–100)
MONOCYTES # BLD AUTO: 0.7 10E3/UL (ref 0–1.3)
MONOCYTES NFR BLD AUTO: 13 %
NEUTROPHILS # BLD AUTO: 3.1 10E3/UL (ref 1.6–8.3)
NEUTROPHILS NFR BLD AUTO: 54 %
NRBC # BLD AUTO: 0 10E3/UL
NRBC BLD AUTO-RTO: 0 /100
PLATELET # BLD AUTO: 285 10E3/UL (ref 150–450)
PROT SERPL-MCNC: 6.6 G/DL (ref 6.4–8.3)
RBC # BLD AUTO: 4.82 10E6/UL (ref 4.4–5.9)
WBC # BLD AUTO: 5.7 10E3/UL (ref 4–11)

## 2024-03-14 PROCEDURE — 80076 HEPATIC FUNCTION PANEL: CPT | Performed by: INTERNAL MEDICINE

## 2024-03-14 PROCEDURE — 85025 COMPLETE CBC W/AUTO DIFF WBC: CPT | Performed by: INTERNAL MEDICINE

## 2024-03-14 PROCEDURE — 86140 C-REACTIVE PROTEIN: CPT | Performed by: INTERNAL MEDICINE

## 2024-03-15 NOTE — PROGRESS NOTES
Skilled Nurse visit in the Patient Home to administer Entyvio 300mg.  No recent elevated temperature, fever, chills, productive cough, coughing for 3 weeks or longer or hemoptysis, abnormal vital signs, night sweats, chest pain. No  decrease in your appetite, unexplained weight loss or fatigue.  No other new onset medical symptoms.  Current weight 236 lbs.  Peripheral IVleft AC, 1attempt  Labs drawn CBCd/p, Hepatic Panel, CRP. Infusion completed without complication or reaction. Pt reports therapy iseffective in managing symptoms related to therapy.     Kelli Neves RN, BSN  [facility]  [phone]  [email or web address]

## 2024-04-05 ENCOUNTER — VIRTUAL VISIT (OUTPATIENT)
Dept: GASTROENTEROLOGY | Facility: CLINIC | Age: 44
End: 2024-04-05
Attending: DIETITIAN, REGISTERED
Payer: COMMERCIAL

## 2024-04-05 DIAGNOSIS — K51.00 ULCERATIVE PANCOLITIS WITHOUT COMPLICATION (H): Primary | ICD-10-CM

## 2024-04-05 DIAGNOSIS — R06.02 SHORTNESS OF BREATH: ICD-10-CM

## 2024-04-05 RX ORDER — LORATADINE 10 MG/1
10 TABLET ORAL DAILY
COMMUNITY

## 2024-04-05 NOTE — Clinical Note
4/5/2024         RE: Raymond Aguayo  [address]  [place] [address]        Dear Colleague,    Thank you for referring your patient, Raymond Aguayo, to the [facility]. Please see a copy of my visit note below.    Medication Therapy Management (MTM) Encounter    ASSESSMENT:                            Medication Adherence/Access: {adherencechoices:859865}    ***:  ***      PLAN:                            Reminder for quantiferon screening to be drawn at any [facility] lab.  Subcutaneous Entyvio infusion   Hakan to consider the following vaccines:  Shingrix series  COVID 23-24 booster    Follow-up: {followuptest2:334315}    SUBJECTIVE/OBJECTIVE:                          Hakan Aguayo is a 43 year old male contacted via secure video for a follow-up visit.       Reason for visit: IBD health maintenance review    Allergies/ADRs: None  Tobacco: He reports that he has never smoked. He has never used smokeless tobacco.  Alcohol: 1-3 beverages / week      Medication Adherence/Access: {fumedadherence:500292}    ***:   Entyvio 300 mg IV every 8 weeks    Current medications: vedolizumab 300 mg q 8 weeks  Current clinical disease activity: pMayo1, intermitent rectal bleeding  Last endoscopic disease activity: mucosal and histo healing 6/2023 (Frausto 0)    Last provider visit:  Next provider visit:  Last Quantiferon:    Disease activity on current therapy:      IBD Health Maintenance    Vaccinations:  All patients on biologics should avoid live vaccines unless specifically indicated.    -- Influenza (every year) not on file - declined  -- TdaP (every 10 years) 8/27/2020  -- Pneumococcal Pneumonia    Prevnar-13: 8/27/2020   Pneumovax-23: 11/5/2020   Prevnar-20: not on file   -- COVID-19 declined    One time confirmation of immunity or serologies:  -- Hepatitis A (serologies or immunizations)  -- Hepatitis B (serologies or immunizations)  -- Varicella/Zoster    Varicella   Zoster has not  received       Due to the immunosuppression in this patient, I would not advise administration of live vaccines such as varicella/VZV, intranasal influenza, MMR, or yellow fever vaccine (if traveling).      Immunosuppressive Screening:    Lab Results   Component Value Date    AUSAB 98.23 (H) 01/07/2021    HEPBANG Nonreactive 01/07/2021    HBCAB Nonreactive 01/07/2021     Bone mineral density screening   -- Recommend all patients supplement with calcium and vitamin D  -- ***Given prior steroid use recommend DEXA if not already done    Cancer Screening:  Colon cancer screening:  Given ***, recommend patient undergo regular dysplasia surveillance   Next dysplasia screening is recommended ***.    Skin cancer screening: Annual visual exam of skin by dermatologist since patient is immunocompromised    Depression Screening:    PHQ-2 Score:         3/5/2024    11:17 AM 3/17/2022     9:22 AM   PHQ-2 ( 1999 Pfizer)   Q1: Little interest or pleasure in doing things 0 0   Q2: Feeling down, depressed or hopeless 0 0   PHQ-2 Score 0 0       Research:  Are you interested in being contacted about enrollment in clinical research studies? Yes      Misc:  -- Avoid tobacco use  -- Avoid NSAIDs as there is potentially a 25% chance of causing an IBD flare    :    Albuterol HFA as needed     Hasn't had to use it yet, but has been having asthma episodes to environmental triggers.     Today's Vitals: There were no vitals taken for this visit.  ----------------  {GRECIA?:781460}    I spent 31 minutes with this patient today. { :690290}. A copy of the visit note was provided to the patient's provider(s).    A summary of these recommendations {GIVEN/NOT GIVEN:573108}.    Mirtha Pike PharmD, BCACP  MTM Pharmacist   [facility]   Phone: [phone]    Telemedicine Visit Details  Type of service:  Video Conference via [employer]  Start Time:  9:28 AM  End Time: 9:59 AM     Medication Therapy Recommendations  No medication  therapy recommendations to display         Again, thank you for allowing me to participate in the care of your patient.        Sincerely,        Mirtha Pike RPH

## 2024-04-05 NOTE — PROGRESS NOTES
Medication Therapy Management (MTM) Encounter    ASSESSMENT:                            Medication Adherence/Access: No issues identified    Ulcerative Colitis: Hakan would benefit from continuing Entyvio every eight weeks for maintenance of remission. We discussed that it would still be beneficial to complete fecal calprotectin testing to rule out inflammation as a source of bleeding, though based on our discussion today it does seem more likely this was related to a hemorrhoid. Discussed option of exploring self-administered Entyvio if he is interested, especially if he will be continuing on standard Entyvio dosing. Discussed health maintenance as noted below. He is due for quantiferon screening which was previously ordered. Provided reminders about vaccines. He will likely pursue Shingrix after the semester ends.    SOB: Unchanged.    PLAN:                            Reminder for quantiferon screening to be drawn at any [facility] lab.  Will explore subcutaneous Entyvio injections after fecal calprotectin results.   Hakan to consider the following vaccines:  Shingrix series  COVID 23-24 booster    -- When you are ready to get the Shingrix vaccine, please let me know and we can try coordinating this through a pharmacy of your choosing.    Follow-up:   6 months for check-in with MTM    SUBJECTIVE/OBJECTIVE:                          Hakan Aguayo is a 43 year old male contacted via secure video for a follow-up visit.       Reason for visit: IBD health maintenance review    Allergies/ADRs: None  Tobacco: He reports that he has never smoked. He has never used smokeless tobacco.  Alcohol: 1-3 beverages / week    Medication Adherence/Access: no issues reported    Ulcerative Colitis:   Entyvio 300 mg IV every 8 weeks    No major concerns today. Wrapping up the semester, so notes its a bit difficult to come into lab right now. He saw Alexandra Sawant PA-C recently who recommended fecal calprotectin testing. He is more suspicious  at this point that the bleeding was due to hemorrhoids. He is somewhat interested in doing subcutaneous Entyvio, but would need to talk this over with his wife first. Feels it may be a bit easier to coordinate, but so far things are going okay with infusions.    Current medications: vedolizumab 300 mg q 8 weeks  Current clinical disease activity: pMayo1, intermitent rectal bleeding  Last endoscopic disease activity: mucosal and histo healing 6/2023 (Frausto 0)    Last provider visit: 3/5/2024 with Alexandra Sawant PA-C  Next provider visit: 10/24/2024 with Dr. Valentin  Last Quantiferon: 1/2021 (negative)    IBD Health Maintenance    Vaccinations:  All patients on biologics should avoid live vaccines unless specifically indicated.    -- Influenza (every year) not on file - declined  -- TdaP (every 10 years) 8/27/2020  -- Pneumococcal Pneumonia    Prevnar-13: 8/27/2020   Pneumovax-23: 11/5/2020   Prevnar-20: not on file   -- COVID-19 10/24/2022 (bivalent), 1/11/2022, 3/26/2021 and 4/16/2021    One time confirmation of immunity or serologies:  -- Hepatitis A (serologies or immunizations) reactive per serologies 2021  -- Hepatitis B (serologies or immunizations) serologies indicate immunity 2021  -- Varicella/Zoster    Varicella presumed based on age   Zoster has not received     Due to the immunosuppression in this patient, I would not advise administration of live vaccines such as varicella/VZV, intranasal influenza, MMR, or yellow fever vaccine (if traveling).      Immunosuppressive Screening:    Lab Results   Component Value Date    AUSAB 98.23 (H) 01/07/2021    HEPBANG Nonreactive 01/07/2021    HBCAB Nonreactive 01/07/2021     Cancer Screening:  Colon cancer screening:  Last 6/2023 with 1 adenomatous polyp, recommended repeat in 2 years (2025)     Skin cancer screening: Annual visual exam of skin by dermatologist since patient is immunocompromised    Depression Screening:    PHQ-2 Score:         3/5/2024    11:17 AM  3/17/2022     9:22 AM   PHQ-2 ( 1999 Pfizer)   Q1: Little interest or pleasure in doing things 0 0   Q2: Feeling down, depressed or hopeless 0 0   PHQ-2 Score 0 0     Research:  Are you interested in being contacted about enrollment in clinical research studies? Yes      Misc:  -- Avoid tobacco use  -- Avoid NSAIDs as there is potentially a 25% chance of causing an IBD flare    SOB:  Albuterol HFA as needed     Hasn't had to use it yet, but has been having asthma episodes to environmental triggers.     ----------------    I spent 31 minutes with this patient today. All changes were made via collaborative practice agreement with Alexandra Sawant. A copy of the visit note was provided to the patient's provider(s).    A summary of these recommendations was sent via [employer].    Mirtha Pike PharmD, BCACP  MTM Pharmacist   [facility]   Phone: [phone]    Telemedicine Visit Details  Type of service:  Video Conference via [employer]  Start Time:  9:28 AM  End Time: 9:59 AM     Medication Therapy Recommendations  Ulcerative colitis (H)    Rationale: Preventive therapy - Needs additional medication therapy - Indication   Recommendation: Start Medication - Shingrix 50 MCG/0.5ML Susr   Status: Patient Agreed - Adherence/Education   Note: Consider Shingrix vaccine series and updated COVID-19 booster

## 2024-04-12 NOTE — PATIENT INSTRUCTIONS
"Recommendations from today's MTM visit:                                                       Reminder for quantiferon screening to be drawn at any [facility] lab.  Will explore subcutaneous Entyvio injections after fecal calprotectin results.   Hakan to consider the following vaccines:  Shingrix series  COVID 23-24 booster    -- When you are ready to get the Shingrix vaccine, please let me know and we can try coordinating this through a pharmacy of your choosing.    Follow-up:   6 months for check-in with MTM    It was great speaking with you today.  I value your experience and would be very thankful for your time in providing feedback in our clinic survey. In the next few days, you may receive an email or text message from [employer] with a link to a survey related to your  clinical pharmacist.\"     To schedule another MTM appointment, please call the clinic directly or you may call the MTM scheduling line at [phone].    My Clinical Pharmacist's contact information:                                                      Please feel free to contact me with any questions or concerns you have.      Mirtha VillalbaD, BCACP  MTM Pharmacist   [facility]   Phone: [phone]    "

## 2024-05-08 ENCOUNTER — DOCUMENTATION ONLY (OUTPATIENT)
Dept: PHARMACY | Facility: CLINIC | Age: 44
End: 2024-05-08
Payer: COMMERCIAL

## 2024-05-08 NOTE — PROGRESS NOTES
Skilled Nurse visit in the Patient Home to administer Entyvio 300mg .  No recent elevated temperature, fever, chills, productive cough, coughing for 3 weeks or longer or hemoptysis, abnormal vital signs, night sweats, chest pain. No  decrease in your appetite, unexplained weight loss or fatigue.  No other new onset medical symptoms.  Current weight 235 lbs.  Peripheral IVleft AC, 1attempt . Infusion completed without complication or reaction. Pt reports therapy iseffective in managing symptoms related to therapy.    Kelli Neves RN, BSN  [facility]  [phone]  [email or web address]

## 2024-06-01 ENCOUNTER — HEALTH MAINTENANCE LETTER (OUTPATIENT)
Age: 44
End: 2024-06-01

## 2024-07-05 ENCOUNTER — LAB REQUISITION (OUTPATIENT)
Dept: LAB | Facility: CLINIC | Age: 44
End: 2024-07-05
Payer: COMMERCIAL

## 2024-07-05 DIAGNOSIS — K51.90 ULCERATIVE COLITIS, UNSPECIFIED, WITHOUT COMPLICATIONS (H): ICD-10-CM

## 2024-07-05 LAB
ALBUMIN SERPL BCG-MCNC: 4 G/DL (ref 3.5–5.2)
ALP SERPL-CCNC: 87 U/L (ref 40–150)
ALT SERPL W P-5'-P-CCNC: 78 U/L (ref 0–70)
AST SERPL W P-5'-P-CCNC: 43 U/L (ref 0–45)
BASOPHILS # BLD AUTO: 0 10E3/UL (ref 0–0.2)
BASOPHILS NFR BLD AUTO: 1 %
BILIRUB DIRECT SERPL-MCNC: <0.2 MG/DL (ref 0–0.3)
BILIRUB SERPL-MCNC: 0.3 MG/DL
CRP SERPL-MCNC: <3 MG/L
EOSINOPHIL # BLD AUTO: 0.1 10E3/UL (ref 0–0.7)
EOSINOPHIL NFR BLD AUTO: 3 %
ERYTHROCYTE [DISTWIDTH] IN BLOOD BY AUTOMATED COUNT: 11.9 % (ref 10–15)
HCT VFR BLD AUTO: 41.4 % (ref 40–53)
HGB BLD-MCNC: 14.5 G/DL (ref 13.3–17.7)
IMM GRANULOCYTES # BLD: 0 10E3/UL
IMM GRANULOCYTES NFR BLD: 0 %
LYMPHOCYTES # BLD AUTO: 1.2 10E3/UL (ref 0.8–5.3)
LYMPHOCYTES NFR BLD AUTO: 26 %
MCH RBC QN AUTO: 30 PG (ref 26.5–33)
MCHC RBC AUTO-ENTMCNC: 35 G/DL (ref 31.5–36.5)
MCV RBC AUTO: 86 FL (ref 78–100)
MONOCYTES # BLD AUTO: 0.5 10E3/UL (ref 0–1.3)
MONOCYTES NFR BLD AUTO: 11 %
NEUTROPHILS # BLD AUTO: 2.8 10E3/UL (ref 1.6–8.3)
NEUTROPHILS NFR BLD AUTO: 60 %
NRBC # BLD AUTO: 0 10E3/UL
NRBC BLD AUTO-RTO: 0 /100
PLATELET # BLD AUTO: 250 10E3/UL (ref 150–450)
PROT SERPL-MCNC: 6.2 G/DL (ref 6.4–8.3)
RBC # BLD AUTO: 4.83 10E6/UL (ref 4.4–5.9)
WBC # BLD AUTO: 4.8 10E3/UL (ref 4–11)

## 2024-07-05 PROCEDURE — 86140 C-REACTIVE PROTEIN: CPT | Performed by: INTERNAL MEDICINE

## 2024-07-05 PROCEDURE — 80076 HEPATIC FUNCTION PANEL: CPT | Performed by: INTERNAL MEDICINE

## 2024-07-05 PROCEDURE — 85025 COMPLETE CBC W/AUTO DIFF WBC: CPT | Performed by: INTERNAL MEDICINE

## 2024-07-15 ENCOUNTER — PATIENT OUTREACH (OUTPATIENT)
Dept: GASTROENTEROLOGY | Facility: CLINIC | Age: 44
End: 2024-07-15
Payer: COMMERCIAL

## 2024-07-15 NOTE — TELEPHONE ENCOUNTER
Attempted to contact the patient to discuss repeating lab draw. No answer, left detailed message regarding next steps and direct callback number if needed.

## 2024-08-28 ENCOUNTER — TELEPHONE (OUTPATIENT)
Dept: GASTROENTEROLOGY | Facility: CLINIC | Age: 44
End: 2024-08-28
Payer: COMMERCIAL

## 2024-08-28 NOTE — TELEPHONE ENCOUNTER
Left Voicemail (1st Attempt) for the patient to call back and schedule the following:    Appointment type: return   Provider: Dr. Valentin  Return date: 10/24/24  Specialty phone number: [phone]  Additional appointment(s) needed:   Additional Notes:

## 2024-08-29 ENCOUNTER — DOCUMENTATION ONLY (OUTPATIENT)
Dept: PHARMACY | Facility: CLINIC | Age: 44
End: 2024-08-29
Payer: COMMERCIAL

## 2024-08-30 NOTE — PROGRESS NOTES
Skilled Nurse visit in the Patient Home to administer Entyvio .  No recent elevated temperature, fever, chills, productive cough, coughing for 3 weeks or longer or hemoptysis, abnormal vital signs, night sweats, chest pain. No  decrease in your appetite, unexplained weight loss or fatigue.  No other new onset medical symptoms.  Current weight 230 lbs.  Peripheral IVleft AC, 1attempt  Infusion completed without complication or reaction. Pt reports therapy is effective in managing symptoms related to therapy.     Kelli Neves RN, BSN  [facility]  [phone]  [email or web address]

## 2024-09-04 ENCOUNTER — TELEPHONE (OUTPATIENT)
Dept: GASTROENTEROLOGY | Facility: CLINIC | Age: 44
End: 2024-09-04
Payer: COMMERCIAL

## 2024-09-04 NOTE — TELEPHONE ENCOUNTER
Left Voicemail (2nd Attempt) for the patient to call back and schedule the following:    Appointment type: return   Provider: Dr. Valentin  Return date: next available   Specialty phone number: [phone]  Additional appointment(s) needed:   Additonal Notes:

## 2024-09-05 ENCOUNTER — ENROLLMENT (OUTPATIENT)
Dept: HOME HEALTH SERVICES | Facility: HOME HEALTH | Age: 44
End: 2024-09-05
Payer: COMMERCIAL

## 2024-10-10 ENCOUNTER — HOME INFUSION (OUTPATIENT)
Dept: HOME HEALTH SERVICES | Facility: HOME HEALTH | Age: 44
End: 2024-10-10
Payer: COMMERCIAL

## 2024-10-10 DIAGNOSIS — K51.90 ULCERATIVE COLITIS (H): Primary | ICD-10-CM

## 2024-10-15 ENCOUNTER — EPISODE UPDATE (OUTPATIENT)
Dept: HOME HEALTH SERVICES | Facility: HOME HEALTH | Age: 44
End: 2024-10-15
Payer: COMMERCIAL

## 2024-10-17 ENCOUNTER — TELEPHONE (OUTPATIENT)
Dept: PHARMACY | Facility: CLINIC | Age: 44
End: 2024-10-17
Payer: COMMERCIAL

## 2024-10-17 NOTE — TELEPHONE ENCOUNTER
Pt is due for follow up with Mirtha COOPER     Call placed to pt to initiate scheduling on 10/17    Outcome: LVM and MyC

## 2024-10-19 VITALS — HEIGHT: 72 IN | BODY MASS INDEX: 31.15 KG/M2 | WEIGHT: 230 LBS

## 2024-10-19 RX ORDER — SODIUM CHLORIDE 9 MG/ML
500 INJECTION, SOLUTION INTRAVENOUS PRN
Qty: 999999 ML | Refills: 0 | Status: ACTIVE | OUTPATIENT
Start: 2024-10-23 | End: 2025-03-05

## 2024-10-19 RX ORDER — WATER 10 ML/10ML
4.8 INJECTION INTRAMUSCULAR; INTRAVENOUS; SUBCUTANEOUS
Qty: 12 ML | Refills: 0 | Status: DISPENSED | OUTPATIENT
Start: 2024-10-23 | End: 2025-03-05

## 2024-10-19 RX ORDER — EPINEPHRINE 0.3 MG/.3ML
0.3 INJECTION SUBCUTANEOUS PRN
Qty: 999999 ML | Refills: 0 | Status: ACTIVE | OUTPATIENT
Start: 2024-10-23 | End: 2025-03-05

## 2024-10-19 RX ORDER — DIPHENHYDRAMINE HYDROCHLORIDE 50 MG/ML
50 INJECTION INTRAMUSCULAR; INTRAVENOUS PRN
Qty: 99999 ML | Refills: 0 | Status: ACTIVE | OUTPATIENT
Start: 2024-10-23 | End: 2025-03-05

## 2024-10-23 ENCOUNTER — HOME INFUSION BILLING (OUTPATIENT)
Dept: HOME HEALTH SERVICES | Facility: HOME HEALTH | Age: 44
End: 2024-10-23
Payer: COMMERCIAL

## 2024-10-23 PROCEDURE — A4930 STERILE, GLOVES PER PAIR: HCPCS

## 2024-10-23 PROCEDURE — A4217 STERILE WATER/SALINE, 500 ML: HCPCS

## 2024-10-23 PROCEDURE — S1015 IV TUBING EXTENSION SET: HCPCS

## 2024-10-23 PROCEDURE — A4215 STERILE NEEDLE: HCPCS

## 2024-10-24 ENCOUNTER — HOME CARE VISIT (OUTPATIENT)
Dept: HOME HEALTH SERVICES | Facility: HOME HEALTH | Age: 44
End: 2024-10-24
Payer: COMMERCIAL

## 2024-10-24 ENCOUNTER — LAB REQUISITION (OUTPATIENT)
Dept: LAB | Facility: CLINIC | Age: 44
End: 2024-10-24
Payer: COMMERCIAL

## 2024-10-24 VITALS
OXYGEN SATURATION: 96 % | RESPIRATION RATE: 16 BRPM | SYSTOLIC BLOOD PRESSURE: 130 MMHG | HEART RATE: 74 BPM | TEMPERATURE: 97.3 F | DIASTOLIC BLOOD PRESSURE: 76 MMHG

## 2024-10-24 DIAGNOSIS — K51.90 ULCERATIVE COLITIS, UNSPECIFIED, WITHOUT COMPLICATIONS (H): ICD-10-CM

## 2024-10-24 LAB
ALBUMIN SERPL BCG-MCNC: 4.1 G/DL (ref 3.5–5.2)
ALP SERPL-CCNC: 93 U/L (ref 40–150)
ALT SERPL W P-5'-P-CCNC: 50 U/L (ref 0–70)
AST SERPL W P-5'-P-CCNC: 30 U/L (ref 0–45)
BASOPHILS # BLD AUTO: 0.1 10E3/UL (ref 0–0.2)
BASOPHILS NFR BLD AUTO: 1 %
BILIRUB DIRECT SERPL-MCNC: <0.2 MG/DL (ref 0–0.3)
BILIRUB SERPL-MCNC: 0.3 MG/DL
CRP SERPL-MCNC: <3 MG/L
EOSINOPHIL # BLD AUTO: 0.2 10E3/UL (ref 0–0.7)
EOSINOPHIL NFR BLD AUTO: 3 %
ERYTHROCYTE [DISTWIDTH] IN BLOOD BY AUTOMATED COUNT: 11.8 % (ref 10–15)
HCT VFR BLD AUTO: 40.9 % (ref 40–53)
HGB BLD-MCNC: 14.5 G/DL (ref 13.3–17.7)
IMM GRANULOCYTES # BLD: 0 10E3/UL
IMM GRANULOCYTES NFR BLD: 0 %
LYMPHOCYTES # BLD AUTO: 1.8 10E3/UL (ref 0.8–5.3)
LYMPHOCYTES NFR BLD AUTO: 28 %
MCH RBC QN AUTO: 29.9 PG (ref 26.5–33)
MCHC RBC AUTO-ENTMCNC: 35.5 G/DL (ref 31.5–36.5)
MCV RBC AUTO: 84 FL (ref 78–100)
MONOCYTES # BLD AUTO: 0.7 10E3/UL (ref 0–1.3)
MONOCYTES NFR BLD AUTO: 11 %
NEUTROPHILS # BLD AUTO: 3.7 10E3/UL (ref 1.6–8.3)
NEUTROPHILS NFR BLD AUTO: 58 %
NRBC # BLD AUTO: 0 10E3/UL
NRBC BLD AUTO-RTO: 0 /100
PLATELET # BLD AUTO: 271 10E3/UL (ref 150–450)
PROT SERPL-MCNC: 6.5 G/DL (ref 6.4–8.3)
RBC # BLD AUTO: 4.85 10E6/UL (ref 4.4–5.9)
WBC # BLD AUTO: 6.4 10E3/UL (ref 4–11)

## 2024-10-24 PROCEDURE — 86140 C-REACTIVE PROTEIN: CPT | Performed by: INTERNAL MEDICINE

## 2024-10-24 PROCEDURE — 85025 COMPLETE CBC W/AUTO DIFF WBC: CPT | Performed by: INTERNAL MEDICINE

## 2024-10-24 PROCEDURE — 99601 HOME NFS VISIT <2 HRS: CPT

## 2024-10-24 PROCEDURE — 80076 HEPATIC FUNCTION PANEL: CPT | Performed by: INTERNAL MEDICINE

## 2024-10-24 PROCEDURE — S9338 HIT IMMUNOTHERAPY DIEM: HCPCS

## 2024-10-24 NOTE — PROGRESS NOTES
"Infusion Nursing Note:  Skilled nurse visit today for entyvio for Raymond Aguayo.   present during visit today: Not Applicable.    Pre-infusion Checklist:   Pre-infusion Checklist    Have you had any delayed reaction since last infusion?   No    Have you recently had an elevated temperature, fever, chills productive cough, coughing for 3 weeks or longer or hemoptysis, abnormal vital signs, night sweats, chest pain, or have you noticed a decrease in your appetite, or noted unexplained weight loss or fatigue?   No    Do you have any open wounds or new incisions?  No    Do you have any recent or upcoming hospitalizations, surgeries, or dental procedures?   No    Do you currently have or recently have had any signs of illness or infection or are you on any antibiotics?   No    Have you had any new, sudden , or worsening abdominal pain?   No    Have you or anyone in your household received a live vaccination in the past 4 weeks?   No    Have you recently been diagnosed with any new nervous system diseases or cancer diagnosis? (i.e., Multiple Sclerosis, Guillain [place], sezures, neurological changes) Are you receiving any form of radiation or chemotherapy?   No    Are you pregnant or breastfeeding, or do you have plans of pregnancy in the future?   No    Have you been having any signs of worsening depression or suicidal ideation?  No    Have there been any other new onset medical symptoms?  No    Did the patient answer \"YES\" to any of the questions above?  No    Will the patient receive a medication that has an order for infusion reaction management?  Yes, and all drugs and supplies are available and none have .    If ordered, has the patient taken pre-medications?  Yes    Plan:   Therapy is appropriate, will proceed with treatment.     Chemotherapy Treatment Conditions:   Biological Infusion Checklist:  ~~~ NOTE: If the patient answers yes to any of the questions below, hold the infusion and contact " ordering provider or on-call provider.    Have you recently had an elevated temperature, fever, chills, productive cough, coughing for 3 weeks or longer or hemoptysis,  abnormal vital signs, night sweats,  chest pain or have you noticed a decrease in your appetite, unexplained weight loss or fatigue? No  Do you have any open wounds or new incisions? No  Do you have any upcoming hospitalizations or surgeries? Does not include esophagogastroduodenoscopy, colonoscopy, endoscopic retrograde cholangiopancreatography (ERCP), endoscopic ultrasound (EUS), dental procedures or joint aspiration/steroid injections No  Do you currently have any signs of illness or infection or are you on any antibiotics? No  Have you had any new, sudden or worsening abdominal pain? No  Have you or anyone in your household received a live vaccination in the past 4 weeks? Please note: No live vaccines while on biologic/chemotherapy until 6 months after the last treatment. Patient can receive the flu vaccine (shot only), pneumovax and the Covid vaccine. It is optimal for the patient to get these vaccines mid cycle, but they can be given at any time as long as it is not on the day of the infusion. No  Have you recently been diagnosed with any new nervous system diseases (ie. Multiple sclerosis, Guillain [place], seizures, neurological changes) or cancer diagnosis? Are you on any form of radiation or chemotherapy? No  Are you pregnant or breast feeding or do you have plans of pregnancy in the future? No  Have you been having any signs of worsening depression or suicidal ideations?  (benlysta only) No  Have there been any other new onset medical symptoms? No  Have you had any new blood clots? (IVIG only) No    Intravenous Access:  Lab draw site LAC, Attempts 1    Post Infusion Assessment:  Patient tolerated infusion without incident.  Blood return noted pre and post infusion.  Site patent and intact, free from redness, edema or discomfort.  No evidence  of extravasations.  Access discontinued per protocol.       Note: PIV placed and flushed with blood return. 60 mL of flushes used for IVP, Labs, and clearing line. Infusion Entyvio started at 1623. Infused over 29 minutes. Tolerated well and effective per pt    Next Visit Plan:   12/19/2024      Camilla Grace, RN 10/24/2024

## 2024-11-05 ENCOUNTER — VIRTUAL VISIT (OUTPATIENT)
Dept: PHARMACY | Facility: CLINIC | Age: 44
End: 2024-11-05
Attending: DIETITIAN, REGISTERED
Payer: COMMERCIAL

## 2024-11-05 VITALS — HEIGHT: 72 IN | BODY MASS INDEX: 31.15 KG/M2 | WEIGHT: 230 LBS

## 2024-11-05 ASSESSMENT — PAIN SCALES - GENERAL: PAINLEVEL_OUTOF10: NO PAIN (0)

## 2024-11-05 NOTE — PROGRESS NOTES
Medication Therapy Management (MTM) Encounter    ASSESSMENT:                            Medication Adherence/Access: No issues identified.    Ulcerative colitis:    Hakan would benefit from continued treatment with Entyvio. He is up to date on routine maintenance labs. He is not up to date on annual tuberculosis screening. Discussed importance of yearly tuberculosis screening and he is agreeable to go get this done. He will need to see a provider by March 2025, he is agreeable to call and schedule an appointment.    Routine vaccinations including COVID and influenza are indicated for Hakan, as well as Shingrix given IBD on advanced therapy, which he will consider.     PLAN:                            Reminder to schedule an appointment with Dr. Valentin or Alexandra Sawant PA-C.     Reminder for quantiferon (tuberculosis) screening to be drawn at any [facility] lab.     Consider COVID, influenza and Shingrix vaccines.  Please let me know if you need assistance coordinating Shingrix through a pharmacy of your choosing.     Follow-up:    -- 6 months for MTM, sooner if needed    SUBJECTIVE/OBJECTIVE:                          Hakan Aguayo is a 44 year old male seen for a follow-up visit.       Reason for visit: Entyvio check-in    Allergies/ADRs: Reviewed in chart  Tobacco: He reports that he has never smoked. He has never used smokeless tobacco.  Alcohol: 1-3 beverages / week    Medication Adherence/Access: no issues reported.    Ulcerative colitis:   Entyvio infusion 300 mg every 8 weeks  MVI daily    He states Entyvio infusions have been going fine. Sometimes struggles with making sure his schedule works with the 8 week infusion schedule, though he is able to manage. He states he is not currently interested in transitioning to the self injections as the infusions are working well for him.     He is aware his October 2024 visit with Dr. Valenitn was cancelled and will call to schedule another appointment so he does not have  delay in care.     States he is not symptomatic and feels that ulcerative colitis is being managed well.     States he had an appointment and forgot, so he plans to reschedule influenza and COVID vaccines. Inquires about Shingrix vaccine, states he will plan to go though primary care to get this rather than pharmacy.     Last provider visit: 3/5/24 Alexandra Sawant PA-C   Next provider visit: not on file  Last Quantiferon: 1/7/21    Disease activity on current therapy:   Last endoscopic evaluation/MRE: 6/2023    Impression:            - The examined portion of the ileum was normal.                          - Inactive (Frausto Score 0) ulcerative colitis, in                          remission, in remission since the last examination.                          Biopsied.                          - One 2 mm polyp in the cecum, removed with a jumbo                          cold forceps. Resected and retrieved.                          - One 4 to 5 mm polyp in the cecum, removed with a                          cold snare. Resected and retrieved.                          - One 3 to 4 mm polyp in the ascending colon, removed                          with a cold snare. Resected and retrieved.                          - Diverticulosis in the sigmoid colon.                          - The examination was otherwise normal on direct and                          retroflexion views.                          He has endoscopic healing. Continue current                          medications.     IBD Health Maintenance    Vaccinations:  All patients on biologics should avoid live vaccines unless specifically indicated.    -- Influenza (every year) not on file   -- TdaP (every 10 years) 8/27/2020  -- Pneumococcal Pneumonia               Prevnar-13: 8/27/2020              Pneumovax-23: 11/5/2020              Prevnar-20: not on file   -- COVID-19 10/24/2022 (bivalent), 1/11/2022, 3/26/2021 and 4/16/2021     One time confirmation of immunity or  serologies:  -- Hepatitis A (serologies or immunizations) reactive per serologies 2021  -- Hepatitis B (serologies or immunizations) serologies indicate immunity 2021  -- Varicella/Zoster               Varicella presumed based on age              Zoster has not received     ----------------    I spent 18 minutes with this patient today. A copy of the visit note was provided to the patient's provider(s).    A summary of these recommendations was sent via [employer].    Telemedicine Visit Details  The patient's medications can be safely assessed via a telemedicine encounter.  Type of service:  Telephone visit  Originating Location (pt. Location): Home    Distant Location (provider location):  On-site  Start Time:  11:02 AM  End Time:  11:20 AM     Medication Therapy Recommendations  No medication therapy recommendations to display

## 2024-11-05 NOTE — NURSING NOTE
Current patient location: [address] [place] [address]    Is the patient currently in the state of [place]? YES    Visit mode:TELEPHONE    If the visit is dropped, the patient can be reconnected by: TELEPHONE VISIT: Phone number:   Telephone Information:   Mobile [phone]       Will anyone else be joining the visit? NO  (If patient encounters technical issues they should call [phone])    Are changes needed to the allergy or medication list? No    Are refills needed on medications prescribed by this physician? NO    Rooming Documentation:  Questionnaire(s) not done per department protocol    Reason for visit: JOLANTA LO

## 2024-11-05 NOTE — PROGRESS NOTES
"Virtual Visit Details    Type of service:  Telephone Visit   Phone call duration: *** minutes   Originating Location (pt. Location): {patient location:511055::\"Home\"}  {PROVIDER LOCATION On-site should be selected for visits conducted from your clinic location or adjoining [facility] hospital, academic office, or other nearby [facility] building. Off-site should be selected for all other provider locations, including home:[id number]}  Distant Location (provider location):  {virtual location provider:[id number]}  "

## 2024-11-06 NOTE — PATIENT INSTRUCTIONS
"Recommendations from today's MTM visit:                                                      Reminder to schedule an appointment with Dr. Valentin or Alexandra Sawant PA-C.     Reminder for quantiferon (tuberculosis) screening to be drawn at any [facility] lab.     Consider COVID, influenza and Shingrix vaccines.  Please let me know if you need assistance coordinating Shingrix through a pharmacy of your choosing.     Follow-up:    -- 6 months for MTM, sooner if needed    It was great speaking with you today.  I value your experience and would be very thankful for your time in providing feedback in our clinic survey. In the next few days, you may receive an email or text message from [employer] with a link to a survey related to your  clinical pharmacist.\"     To schedule another MTM appointment, please call the clinic directly or you may call the MTM scheduling line at [phone].    My Clinical Pharmacist's contact information:                                                      Please feel free to contact me with any questions or concerns you have.      Mirtha VillalbaD, BCACP  MTM Pharmacist   [facility]   Phone: [phone]    "

## 2024-12-18 ENCOUNTER — HOME INFUSION BILLING (OUTPATIENT)
Dept: HOME HEALTH SERVICES | Facility: HOME HEALTH | Age: 44
End: 2024-12-18
Payer: COMMERCIAL

## 2024-12-18 PROCEDURE — A4217 STERILE WATER/SALINE, 500 ML: HCPCS | Mod: JZ

## 2024-12-18 PROCEDURE — A4215 STERILE NEEDLE: HCPCS

## 2024-12-18 PROCEDURE — A4930 STERILE, GLOVES PER PAIR: HCPCS

## 2024-12-18 PROCEDURE — S1015 IV TUBING EXTENSION SET: HCPCS

## 2024-12-19 ENCOUNTER — HOME CARE VISIT (OUTPATIENT)
Dept: HOME HEALTH SERVICES | Facility: HOME HEALTH | Age: 44
End: 2024-12-19
Payer: COMMERCIAL

## 2024-12-19 VITALS
RESPIRATION RATE: 15 BRPM | HEART RATE: 71 BPM | TEMPERATURE: 97.5 F | SYSTOLIC BLOOD PRESSURE: 108 MMHG | DIASTOLIC BLOOD PRESSURE: 76 MMHG | OXYGEN SATURATION: 96 %

## 2024-12-19 PROCEDURE — S9338 HIT IMMUNOTHERAPY DIEM: HCPCS

## 2024-12-19 NOTE — PROGRESS NOTES
"Infusion Nursing Note:  Skilled nurse visit today for SN[facility] for Entyvio infusion , [place] .  for Raymond Aguayo.   present during visit today: Not Applicable.    Pre-infusion Checklist:   Pre-infusion Checklist    Have you had any delayed reaction since last infusion?   No    Have you recently had an elevated temperature, fever, chills productive cough, coughing for 3 weeks or longer or hemoptysis, abnormal vital signs, night sweats, chest pain, or have you noticed a decrease in your appetite, or noted unexplained weight loss or fatigue?   No    Do you have any open wounds or new incisions?  No    Do you have any recent or upcoming hospitalizations, surgeries, or dental procedures?   No    Do you currently have or recently have had any signs of illness or infection or are you on any antibiotics?   No    Have you had any new, sudden , or worsening abdominal pain?   No    Have you or anyone in your household received a live vaccination in the past 4 weeks?   No    Have you recently been diagnosed with any new nervous system diseases or cancer diagnosis? (i.e., Multiple Sclerosis, Guillain [place], sezures, neurological changes) Are you receiving any form of radiation or chemotherapy?   No    Are you pregnant or breastfeeding, or do you have plans of pregnancy in the future?   No    Have you been having any signs of worsening depression or suicidal ideation?  No    Have there been any other new onset medical symptoms?  No    Did the patient answer \"YES\" to any of the questions above?  No    Will the patient receive a medication that has an order for infusion reaction management?  Yes, and all drugs and supplies are available and none have .    If ordered, has the patient taken pre-medications?  N/A    Plan:   Therapy is appropriate, will proceed with treatment.     Chemotherapy Treatment Conditions:   Not Applicable    Intravenous Access:  Peripheral IV placed.    Post Infusion Assessment:  Patient tolerated " infusion without incident.  Blood return noted pre and post infusion.  No evidence of extravasations.  Access discontinued per protocol.  Biologic Infusion Post Education: Call the triage nurse at your clinic or seek medical attention if you have chills and/or temperature greater than or equal to 100.5, uncontrolled nausea/vomiting, diarrhea, constipation, dizziness, shortness of breath, chest pain, heart palpitations, weakness or any other new or concerning symptoms, questions or concerns.  You cannot have any live virus vaccines prior to or during treatment or up to 6 months post infusion.  If you have an upcoming surgery, medical procedure or dental procedure during treatment, this should be discussed with your ordering physician and your surgeon/dentist.  If you are having any concerning symptom, if you are unsure if you should get your next infusion or wish to speak to a provider before your next infusion, please call your care coordinator or triage nurse at your clinic to notify them so we can adequately serve you.     Note: No GI flare ups reported . Tolerating infusions well . No new med changes . Tolerated interventions well .     Saline administered (in mLs): 50ml     Next Visit Plan:   2/13/25 for Entyvio infusion therapy well, labs , [place] at 1430      Kelli Neves, RN 12/19/2024

## 2025-01-30 ENCOUNTER — HOME INFUSION (OUTPATIENT)
Dept: HOME HEALTH SERVICES | Facility: HOME HEALTH | Age: 45
End: 2025-01-30
Payer: COMMERCIAL

## 2025-01-30 DIAGNOSIS — K51.90 ULCERATIVE COLITIS (H): ICD-10-CM

## 2025-02-06 ENCOUNTER — TELEPHONE (OUTPATIENT)
Dept: GASTROENTEROLOGY | Facility: CLINIC | Age: 45
End: 2025-02-06
Payer: COMMERCIAL

## 2025-02-06 DIAGNOSIS — K51.00 ULCERATIVE PANCOLITIS WITHOUT COMPLICATION (H): Primary | ICD-10-CM

## 2025-02-06 NOTE — TELEPHONE ENCOUNTER
----- Message from Amanda SCHWARTZ sent at 2/4/2025 10:45 AM CST -----  Regarding: FW: Entyvio renewal needed    ----- Message -----  From: Epi Foote [facility]  Sent: 2/4/2025  10:36 AM CST  To: [facility]  Subject: Entyvio renewal needed                           Hello,    Patients orders will be expiring soon. Is it OK for [facility] to renew our current Entyvio orders for another year?     Thank you,    Epi Foote, PharmD  Clinical Pharmacist   [facility]   Office: [phone]   Fax: [phone]

## 2025-02-06 NOTE — TELEPHONE ENCOUNTER
Vedolizumab therapy plan orders ; plan has been updated. Patient remains on the same medication regimen Vedolizumab every 8 weeks with I. Standing lab orders placed. TB Quantiferon Gold overdue. Hepatitis B serologies are up to date. Next office visit not yet scheduled.    [employer] message sent with instructions to schedule next steps.     [employer]basket message sent to clinic coordinators requesting assistance with scheduling next steps.

## 2025-02-11 RX ORDER — DIPHENHYDRAMINE HYDROCHLORIDE 50 MG/ML
50 INJECTION INTRAMUSCULAR; INTRAVENOUS PRN
Qty: 99999 ML | Refills: 0 | Status: ACTIVE | OUTPATIENT
Start: 2025-02-11 | End: 2026-02-06

## 2025-02-11 RX ORDER — SODIUM CHLORIDE 9 MG/ML
500 INJECTION, SOLUTION INTRAVENOUS PRN
Qty: 999999 ML | Refills: 0 | Status: ACTIVE | OUTPATIENT
Start: 2025-02-11 | End: 2026-02-06

## 2025-02-11 RX ORDER — EPINEPHRINE 0.3 MG/.3ML
0.3 INJECTION SUBCUTANEOUS PRN
Qty: 999999 ML | Refills: 0 | Status: ACTIVE | OUTPATIENT
Start: 2025-02-11 | End: 2026-02-06

## 2025-02-11 RX ORDER — WATER 10 ML/10ML
4.8 INJECTION INTRAMUSCULAR; INTRAVENOUS; SUBCUTANEOUS
Qty: 12 ML | Refills: 0 | Status: ACTIVE | OUTPATIENT
Start: 2025-02-11 | End: 2026-02-06

## 2025-02-12 ENCOUNTER — HOME INFUSION BILLING (OUTPATIENT)
Dept: HOME HEALTH SERVICES | Facility: HOME HEALTH | Age: 45
End: 2025-02-12
Payer: COMMERCIAL

## 2025-02-12 PROCEDURE — S1015 IV TUBING EXTENSION SET: HCPCS

## 2025-02-12 PROCEDURE — A4217 STERILE WATER/SALINE, 500 ML: HCPCS | Mod: JZ

## 2025-02-12 PROCEDURE — A4215 STERILE NEEDLE: HCPCS

## 2025-02-12 PROCEDURE — A4930 STERILE, GLOVES PER PAIR: HCPCS

## 2025-02-13 ENCOUNTER — LAB REQUISITION (OUTPATIENT)
Dept: LAB | Facility: CLINIC | Age: 45
End: 2025-02-13
Payer: COMMERCIAL

## 2025-02-13 ENCOUNTER — HOME CARE VISIT (OUTPATIENT)
Dept: HOME HEALTH SERVICES | Facility: HOME HEALTH | Age: 45
End: 2025-02-13
Payer: COMMERCIAL

## 2025-02-13 VITALS
TEMPERATURE: 97.3 F | DIASTOLIC BLOOD PRESSURE: 70 MMHG | RESPIRATION RATE: 16 BRPM | OXYGEN SATURATION: 94 % | SYSTOLIC BLOOD PRESSURE: 106 MMHG | HEART RATE: 77 BPM

## 2025-02-13 DIAGNOSIS — K51.90 ULCERATIVE COLITIS, UNSPECIFIED, WITHOUT COMPLICATIONS (H): ICD-10-CM

## 2025-02-13 LAB
ALBUMIN SERPL BCG-MCNC: 4.2 G/DL (ref 3.5–5.2)
ALP SERPL-CCNC: 101 U/L (ref 40–150)
ALT SERPL W P-5'-P-CCNC: 78 U/L (ref 0–70)
AST SERPL W P-5'-P-CCNC: 34 U/L (ref 0–45)
BASOPHILS # BLD AUTO: 0.1 10E3/UL (ref 0–0.2)
BASOPHILS NFR BLD AUTO: 1 %
BILIRUB DIRECT SERPL-MCNC: <0.2 MG/DL (ref 0–0.3)
BILIRUB SERPL-MCNC: 0.4 MG/DL
CRP SERPL-MCNC: <3 MG/L
EOSINOPHIL # BLD AUTO: 0.1 10E3/UL (ref 0–0.7)
EOSINOPHIL NFR BLD AUTO: 2 %
ERYTHROCYTE [DISTWIDTH] IN BLOOD BY AUTOMATED COUNT: 11.9 % (ref 10–15)
HCT VFR BLD AUTO: 40.8 % (ref 40–53)
HGB BLD-MCNC: 14.8 G/DL (ref 13.3–17.7)
IMM GRANULOCYTES # BLD: 0 10E3/UL
IMM GRANULOCYTES NFR BLD: 1 %
LYMPHOCYTES # BLD AUTO: 1.8 10E3/UL (ref 0.8–5.3)
LYMPHOCYTES NFR BLD AUTO: 28 %
MCH RBC QN AUTO: 29.7 PG (ref 26.5–33)
MCHC RBC AUTO-ENTMCNC: 36.3 G/DL (ref 31.5–36.5)
MCV RBC AUTO: 82 FL (ref 78–100)
MONOCYTES # BLD AUTO: 0.5 10E3/UL (ref 0–1.3)
MONOCYTES NFR BLD AUTO: 9 %
NEUTROPHILS # BLD AUTO: 3.7 10E3/UL (ref 1.6–8.3)
NEUTROPHILS NFR BLD AUTO: 60 %
NRBC # BLD AUTO: 0 10E3/UL
NRBC BLD AUTO-RTO: 0 /100
PLATELET # BLD AUTO: 305 10E3/UL (ref 150–450)
PROT SERPL-MCNC: 6.6 G/DL (ref 6.4–8.3)
RBC # BLD AUTO: 4.99 10E6/UL (ref 4.4–5.9)
WBC # BLD AUTO: 6.2 10E3/UL (ref 4–11)

## 2025-02-13 PROCEDURE — 85025 COMPLETE CBC W/AUTO DIFF WBC: CPT | Performed by: INTERNAL MEDICINE

## 2025-02-13 PROCEDURE — 86140 C-REACTIVE PROTEIN: CPT | Performed by: INTERNAL MEDICINE

## 2025-02-13 PROCEDURE — S9338 HIT IMMUNOTHERAPY DIEM: HCPCS

## 2025-02-13 PROCEDURE — 82040 ASSAY OF SERUM ALBUMIN: CPT | Performed by: INTERNAL MEDICINE

## 2025-02-13 NOTE — PROGRESS NOTES
Nursing Visit Note:  Nurse visit today for SNV for Entyvio infusion , labs , [place]  for Raymond Aguayo.     present during visit today: Not Applicable.    Intravenous Access:  Labs drawn without difficulty. and Peripheral IV placed.    Lab-Only Nursing Note    Labs obtained via VPT    Time Specimen drawn: 1320    Last dose (if applicable): No    Facility sent to: [facility] Tracking number: [id number]    Note: Pt and family have been sick with influenza A abx norovirus . Pt asymptomatic currently . VSS . No other changes reported . Labs drawn from PIV start . Tokerated interventions well .     Saline administered: 50 (ml)    Supply Check:   Does the patient have all the supplies they need for the next visit?  Yes    Next visit plan: SNV fir Entyvio infusion , [place] 4/10/25 at 1300 or 1400 confirmed with pt     Kelli Neves, RN 2/13/2025

## 2025-04-07 ENCOUNTER — HOME INFUSION (OUTPATIENT)
Dept: HOME HEALTH SERVICES | Facility: HOME HEALTH | Age: 45
End: 2025-04-07
Payer: COMMERCIAL

## 2025-04-09 ENCOUNTER — HOME INFUSION BILLING (OUTPATIENT)
Dept: HOME HEALTH SERVICES | Facility: HOME HEALTH | Age: 45
End: 2025-04-09
Payer: COMMERCIAL

## 2025-04-09 PROCEDURE — A4215 STERILE NEEDLE: HCPCS

## 2025-04-09 PROCEDURE — A4217 STERILE WATER/SALINE, 500 ML: HCPCS | Mod: JZ

## 2025-04-09 PROCEDURE — S1015 IV TUBING EXTENSION SET: HCPCS

## 2025-04-10 ENCOUNTER — HOME CARE VISIT (OUTPATIENT)
Dept: HOME HEALTH SERVICES | Facility: HOME HEALTH | Age: 45
End: 2025-04-10
Payer: COMMERCIAL

## 2025-04-10 VITALS
DIASTOLIC BLOOD PRESSURE: 76 MMHG | HEART RATE: 82 BPM | SYSTOLIC BLOOD PRESSURE: 108 MMHG | TEMPERATURE: 97.5 F | RESPIRATION RATE: 16 BRPM | OXYGEN SATURATION: 97 %

## 2025-04-10 PROCEDURE — S9338 HIT IMMUNOTHERAPY DIEM: HCPCS

## 2025-04-10 NOTE — PROGRESS NOTES
Nursing Visit Note:  Nurse visit today for SNV for Entyvio infusion, [place]  for Raymond Aguayo.     present during visit today: Not Applicable.    Intravenous Access:  Peripheral IV placed.    Infusion Nursing Note:    Pre-infusion Checklist:   Have you had any delayed reaction since last infusion?   No     Have you recently had an elevated temperature, fever, chills productive cough, coughing for 3 weeks or longer or hemoptysis, abnormal vital signs, night sweats, chest pain, or have you noticed a decrease in your appetite, or noted unexplained weight loss or fatigue?   No     Do you have any open wounds or new incisions?  No     Do you have any recent or upcoming hospitalizations, surgeries, or dental procedures? Does not include esophagogastroduodenoscopy, colonoscopy, endoscopic retrograde cholangiopancreatography (ERCP), endoscopic ultrasound (EUS), dental procedures or joint aspiration/steroid injections.   No     Do you currently have or recently have had any signs of illness or infection or are you on any antibiotics?   No     Have you had any new, sudden, or worsening abdominal pain?   No     Have you or anyone in your household received a live vaccination in the past 4 weeks?   No     Have you recently been diagnosed with any new nervous system diseases or cancer diagnosis? (i.e., Multiple Sclerosis, Guillain [place], seizures, neurological changes) Are you receiving any form of radiation or chemotherapy?   No     Are you pregnant or breastfeeding, or do you have plans of pregnancy in the future?   No     Have you been having any signs of worsening depression or suicidal ideation?  No     Have you had any other new onset medical symptoms?  No    Entyvio/Ocrevus/Tyasabri only: Have you been having any new or worsening medical problems such as issues with thinking, eyesight, balance or strength that have persisted over several days?   No    Benlysta only: Have you been having any signs of worsening  "depression or suicidal ideations?    N/A    IVIG only: Have you had any new blood clots?  N/A    Did the patient answer \"YES\" to any of the questions above?  No     Will the patient receive a medication that has an order for infusion reaction management?  Yes, and all drugs and supplies are available and none have .     If ordered, has the patient taken pre-medications?  N/A    Plan:   Therapy is appropriate, will proceed with treatment.     Post Infusion Assessment:  Patient tolerated infusion without incident.  Blood return noted pre and post infusion.  Site patent and intact, free from redness, edema or discomfort.  No evidence of extravasations.  Access discontinued per protocol.  Biologic Infusion Post Education: Call the triage nurse at your clinic or seek medical attention if you have chills and/or temperature greater than or equal to 100.5, uncontrolled nausea/vomiting, diarrhea, constipation, dizziness, shortness of breath, chest pain, heart palpitations, weakness or any other new or concerning symptoms, questions or concerns.  You cannot have any live virus vaccines prior to or during treatment or up to 6 months post infusion.  If you have an upcoming surgery, medical procedure or dental procedure during treatment, this should be discussed with your ordering physician and your surgeon/dentist.  If you are having any concerning symptom, if you are unsure if you should get your next infusion or wish to speak to a provider before your next infusion, please call your care coordinator or triage nurse at your clinic to notify them so we can adequately serve you.     Note: Patient A&O , in good spirits. Denies any new changes. Tolerated interventions well     Saline administered: 60 (ml)    Supply Check:   Does the patient have all the supplies they need for the next visit?  Yes    Next visit plan: SNV for Entyvio infusion , labs , GA 24 at 1:30pm confirmed with pt     Kelli Neves, RN 4/10/2025  "

## 2025-04-13 ENCOUNTER — EPISODE UPDATE (OUTPATIENT)
Dept: HOME HEALTH SERVICES | Facility: HOME HEALTH | Age: 45
End: 2025-04-13
Payer: COMMERCIAL

## 2025-06-03 ENCOUNTER — HOME INFUSION (OUTPATIENT)
Dept: HOME HEALTH SERVICES | Facility: HOME HEALTH | Age: 45
End: 2025-06-03
Payer: COMMERCIAL

## 2025-06-03 ENCOUNTER — TELEPHONE (OUTPATIENT)
Dept: PHARMACY | Facility: CLINIC | Age: 45
End: 2025-06-03
Payer: COMMERCIAL

## 2025-06-03 NOTE — TELEPHONE ENCOUNTER
Pt is due for follow up with Mirtha COOPER     Call placed to pt to initiate scheduling on 6/3/25    Outcome: BRYANTM

## 2025-06-04 ENCOUNTER — HOME INFUSION BILLING (OUTPATIENT)
Dept: HOME HEALTH SERVICES | Facility: HOME HEALTH | Age: 45
End: 2025-06-04
Payer: COMMERCIAL

## 2025-06-04 PROCEDURE — A4217 STERILE WATER/SALINE, 500 ML: HCPCS | Mod: JZ

## 2025-06-04 PROCEDURE — S1015 IV TUBING EXTENSION SET: HCPCS

## 2025-06-04 PROCEDURE — A4215 STERILE NEEDLE: HCPCS

## 2025-06-05 ENCOUNTER — LAB REQUISITION (OUTPATIENT)
Dept: LAB | Facility: CLINIC | Age: 45
End: 2025-06-05
Payer: COMMERCIAL

## 2025-06-05 ENCOUNTER — HOME CARE VISIT (OUTPATIENT)
Dept: HOME HEALTH SERVICES | Facility: HOME HEALTH | Age: 45
End: 2025-06-05
Payer: COMMERCIAL

## 2025-06-05 VITALS
TEMPERATURE: 97.3 F | SYSTOLIC BLOOD PRESSURE: 108 MMHG | DIASTOLIC BLOOD PRESSURE: 70 MMHG | OXYGEN SATURATION: 96 % | HEART RATE: 82 BPM | RESPIRATION RATE: 16 BRPM

## 2025-06-05 DIAGNOSIS — K51.90 ULCERATIVE COLITIS, UNSPECIFIED, WITHOUT COMPLICATIONS (H): ICD-10-CM

## 2025-06-05 LAB
ALBUMIN SERPL BCG-MCNC: 4 G/DL (ref 3.5–5.2)
ALP SERPL-CCNC: 93 U/L (ref 40–150)
ALT SERPL W P-5'-P-CCNC: 50 U/L (ref 0–70)
AST SERPL W P-5'-P-CCNC: 24 U/L (ref 0–45)
BASOPHILS # BLD AUTO: 0.1 10E3/UL (ref 0–0.2)
BASOPHILS NFR BLD AUTO: 1 %
BILIRUB DIRECT SERPL-MCNC: 0.08 MG/DL (ref 0–0.3)
BILIRUB SERPL-MCNC: 0.3 MG/DL
CRP SERPL-MCNC: <3 MG/L
EOSINOPHIL # BLD AUTO: 0.2 10E3/UL (ref 0–0.7)
EOSINOPHIL NFR BLD AUTO: 3 %
ERYTHROCYTE [DISTWIDTH] IN BLOOD BY AUTOMATED COUNT: 11.9 % (ref 10–15)
HCT VFR BLD AUTO: 40.9 % (ref 40–53)
HGB BLD-MCNC: 14.4 G/DL (ref 13.3–17.7)
IMM GRANULOCYTES # BLD: 0 10E3/UL
IMM GRANULOCYTES NFR BLD: 1 %
LYMPHOCYTES # BLD AUTO: 1.5 10E3/UL (ref 0.8–5.3)
LYMPHOCYTES NFR BLD AUTO: 25 %
MCH RBC QN AUTO: 29.9 PG (ref 26.5–33)
MCHC RBC AUTO-ENTMCNC: 35.2 G/DL (ref 31.5–36.5)
MCV RBC AUTO: 85 FL (ref 78–100)
MONOCYTES # BLD AUTO: 0.7 10E3/UL (ref 0–1.3)
MONOCYTES NFR BLD AUTO: 12 %
NEUTROPHILS # BLD AUTO: 3.5 10E3/UL (ref 1.6–8.3)
NEUTROPHILS NFR BLD AUTO: 59 %
NRBC # BLD AUTO: 0 10E3/UL
NRBC BLD AUTO-RTO: 0 /100
PLATELET # BLD AUTO: 248 10E3/UL (ref 150–450)
PROT SERPL-MCNC: 6.2 G/DL (ref 6.4–8.3)
RBC # BLD AUTO: 4.82 10E6/UL (ref 4.4–5.9)
WBC # BLD AUTO: 5.9 10E3/UL (ref 4–11)

## 2025-06-05 PROCEDURE — 80076 HEPATIC FUNCTION PANEL: CPT | Performed by: INTERNAL MEDICINE

## 2025-06-05 PROCEDURE — 86140 C-REACTIVE PROTEIN: CPT | Performed by: INTERNAL MEDICINE

## 2025-06-05 PROCEDURE — 85004 AUTOMATED DIFF WBC COUNT: CPT | Performed by: INTERNAL MEDICINE

## 2025-06-05 PROCEDURE — 85025 COMPLETE CBC W/AUTO DIFF WBC: CPT | Performed by: INTERNAL MEDICINE

## 2025-06-05 PROCEDURE — S9338 HIT IMMUNOTHERAPY DIEM: HCPCS

## 2025-06-05 NOTE — PROGRESS NOTES
Nursing Visit Note:  Nurse visit today for Entyvio infusion, labs , GA  for Raymond Aguayo.     present during visit today: Not Applicable.    Intravenous Access:  Labs drawn without difficulty. and Peripheral IV placed.    Infusion Nursing Note:    Pre-infusion Checklist:   Have you had any delayed reaction since last infusion?   No     Have you recently had an elevated temperature, fever, chills productive cough, coughing for 3 weeks or longer or hemoptysis, abnormal vital signs, night sweats, chest pain, or have you noticed a decrease in your appetite, or noted unexplained weight loss or fatigue?   No     Do you have any open wounds or new incisions?  No     Do you have any recent or upcoming hospitalizations, surgeries, or dental procedures? Does not include esophagogastroduodenoscopy, colonoscopy, endoscopic retrograde cholangiopancreatography (ERCP), endoscopic ultrasound (EUS), dental procedures or joint aspiration/steroid injections.   No     Do you currently have or recently have had any signs of illness or infection or are you on any antibiotics?   No     Have you had any new, sudden, or worsening abdominal pain?   No     Have you or anyone in your household received a live vaccination in the past 4 weeks?   No     Have you recently been diagnosed with any new nervous system diseases or cancer diagnosis? (i.e., Multiple Sclerosis, Guillain [place], seizures, neurological changes) Are you receiving any form of radiation or chemotherapy?   No     Are you pregnant or breastfeeding, or do you have plans of pregnancy in the future?   No     Have you been having any signs of worsening depression or suicidal ideation?  No     Have you had any other new onset medical symptoms?  No    Entyvio/Ocrevus/Tyasabri only: Have you been having any new or worsening medical problems such as issues with thinking, eyesight, balance or strength that have persisted over several days?   No    Benlysta only: Have you  "been having any signs of worsening depression or suicidal ideations?    N/A    IVIG only: Have you had any new blood clots?  N/A    Did the patient answer \"YES\" to any of the questions above?  No     Will the patient receive a medication that has an order for infusion reaction management?  Yes, and all drugs and supplies are available and none have .     If ordered, has the patient taken pre-medications?  N/A    Plan:   Therapy is appropriate, will proceed with treatment.     Post Infusion Assessment:  Patient tolerated infusion without incident.  Blood return noted pre and post infusion.  Site patent and intact, free from redness, edema or discomfort.  No evidence of extravasations.  Access discontinued per protocol.      and Lab-Only Nursing Note    Labs obtained via VPT    Time Specimen drawn: 1440    Last dose (if applicable): No    Facility sent to: [facility] Tracking number: [id number]    Note: Patient A&O , in good spirits. VSS . Denies any new changes or GI flares . Labs drawn from PIV start . Tolerated interventions well     Saline administered: 60 (ml)    Supply Check:   Does the patient have all the supplies they need for the next visit?  No, Order placed for patient lab labels     Next visit plan: [facility] for Entyvio infusion , GA 25     Kelli Neves, RN 2025  "

## 2025-06-09 ENCOUNTER — RESULTS FOLLOW-UP (OUTPATIENT)
Dept: GASTROENTEROLOGY | Facility: CLINIC | Age: 45
End: 2025-06-09

## 2025-06-14 ENCOUNTER — HEALTH MAINTENANCE LETTER (OUTPATIENT)
Age: 45
End: 2025-06-14

## 2025-06-25 ENCOUNTER — TELEPHONE (OUTPATIENT)
Dept: UROLOGY | Facility: CLINIC | Age: 45
End: 2025-06-25
Payer: COMMERCIAL

## 2025-06-25 NOTE — TELEPHONE ENCOUNTER
Patient Details  Patient's Full Name  Raymond Aguayo  Patient's Date of Birth  1980  Patient's Phone Number  [phone]  Patient's Email ID  [email or web address]  [facility]?  Yes  Address Details  Address  [address] [place]  [address]  Appointment Preferences  Reason for appointment  I'm interested in a vasectomy this summer.  Preferred Location  [facility] or [facility]  Preferred Visit Type  Either   Services   Do you need an  for your appointment?  No  Billing Preference  Which billing situation applies to the patient?  Patient has insurance  Insurance Information  Insurance Provider Name  Other - [employer]  Member ID/ Policy Number  [id number]  Group Number  [id number]  Insurance Contact for Provider  [phone]  Policy Pryor  Is the patient the Insurance Policy pryor/subscriber?  Yes, patient is the policy pryor  Callback Preferences  Could you share your preferred callback time with us?  Yes, I have a preferred callback time  Preferred Time  Wednesday - Morning (8:00 AM to Noon)  Another Time  Thursday - Morning (8:00 AM to Noon)

## 2025-06-30 ENCOUNTER — VIRTUAL VISIT (OUTPATIENT)
Dept: UROLOGY | Facility: CLINIC | Age: 45
End: 2025-06-30
Payer: COMMERCIAL

## 2025-06-30 DIAGNOSIS — Z30.2 ENCOUNTER FOR STERILIZATION: Primary | ICD-10-CM

## 2025-06-30 PROCEDURE — 98001 SYNCH AUDIO-VIDEO NEW LOW 30: CPT | Performed by: STUDENT IN AN ORGANIZED HEALTH CARE EDUCATION/TRAINING PROGRAM

## 2025-06-30 PROCEDURE — 1126F AMNT PAIN NOTED NONE PRSNT: CPT | Mod: 95 | Performed by: STUDENT IN AN ORGANIZED HEALTH CARE EDUCATION/TRAINING PROGRAM

## 2025-06-30 RX ORDER — ACETAMINOPHEN 325 MG/1
975 TABLET ORAL ONCE
OUTPATIENT
Start: 2025-06-30 | End: 2025-06-30

## 2025-06-30 NOTE — LETTER
6/30/2025       RE: Raymond Aguayo  [address] [place]  [facility] [address]     Dear Colleague,    Thank you for referring your patient, Raymond Aguayo, to the [facility] at [facility]. Please see a copy of my visit note below.    VASECTOMY CONSULTATION NOTE  DATE OF VISIT: 6/30/2025  PATIENT NAME: Raymond Aguayo    YOB: 1980      REASON FOR CONSULTATION: Mr. Raymond Aguayo is a 45 year old year old gentleman who is seen today requesting a vasectomy as a form of permanent birth control.     He has four children: 17, 15, 13, and 11.     No prior scrotal/inguinal surgeries, no hx diabetes, he does not take blood thinners, he believes his scrotum is tight.     He knows he would prefer to have the procedure in the OR as he does not enjoy being awake for medical/dental procedures.    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     PAST MEDICAL HISTORY:   Past Medical History:   Diagnosis Date     Ulcerative colitis (H) 2015     Wrist fracture, right 1992    minor, sports       PAST SURGICAL HISTORY:   Past Surgical History:   Procedure Laterality Date     COLONOSCOPY  5/2015     COLONOSCOPY N/A 1/4/2021    Procedure: COLONOSCOPY, WITH  BIOPSY;  Surgeon: Colton Valentin MD;  Location: [facility] OR     COLONOSCOPY N/A 6/13/2023    Procedure: COLONOSCOPY, WITH POLYPECTOMY AND BIOPSY;  Surgeon: Colton Valentin MD;  Location: [facility] OR      TOOTH EXTRACTION W/FORCEP  2009     SIGMOIDOSCOPY FLEXIBLE N/A 12/22/2021    Procedure: SIGMOIDOSCOPY, FLEXIBLE WITH BIOPSY;  Surgeon: Colton Valentin MD;  Location: [facility] OR       MEDICATIONS:   Current Outpatient Medications:      acetaminophen (TYLENOL) 325 MG tablet, Take 325-650 mg by mouth every 6 hours as needed for mild pain, Disp: , Rfl:      albuterol (PROAIR HFA/PROVENTIL HFA/VENTOLIN HFA) 108 (90 Base) MCG/ACT inhaler, Inhale 2 puffs into the lungs every 6 hours as needed, Disp: , Rfl:  "     diphenhydrAMINE (BENADRYL) 50 MG/ML injection, Inject 1 mL (50 mg) over 3-5 minutes into the vein via push as needed for other (infusion reaction). For RN use only.  Draw up in a syringe and administer IV push.  Discard remainder of vial., Disp: 40073 mL, Rfl: 0     Emergency Supply Kit, PIV,, Patient use for emergency only. Contents: 3 sodium chloride 0.9% flushes, 1 IV start kit, 1 microclave ext set 14\", 1 each IV Cath 22 G/1\" and 24G/3/4\", 6 alcohol prep pads, 4 nitrile gloves (med). Call [phone] to reorder., Disp: 891013 kit, Rfl: 0     EPINEPHrine (ANY BX GENERIC EQUIV) 0.3 MG/0.3ML injection 2-pack, Inject 0.3 mLs (0.3 mg) into the muscle as needed for anaphylaxis (infusion reaction). Administer into the mid-thigh in case of severe anaphylaxis (wheezing, throat tightening, mouth swelling, difficulty breathing). May repeat dose one time in 5-15 minutes if symptoms persist., Disp: 549007 mL, Rfl: 0     loratadine (CLARITIN) 10 MG tablet, Take 10 mg by mouth daily, Disp: , Rfl:      methylPREDNISolone Na Suc, PF, (SOLU-MEDROL) 125 mg/2 mL injection, Inject 2 mLs (125 mg) over 3-5 minutes into the vein via push as needed (infusion reaction). For RN use only.  Reconstitute vial. Draw up methylPREDNISolone in a syringe and administer.  Discard remainder of vial., Disp: 034729 mL, Rfl: 0     multivitamin, therapeutic (THERA-VIT) TABS tablet, Take 1 tablet by mouth daily, Disp: , Rfl:      sodium chloride 0.9% bag, Add 5 mL (300 mg) of ENTYVIO 60 mg/mL to bag immediately prior to infusing into the vein via gravity over 30 minutes every 8 weeks.  Flush bag with 30 mL NS to flush tubing., Disp: 660 mL, Rfl: 0     sodium chloride 0.9% infusion, Infuse 500 mLs into the vein as needed for other (infusion reaction). In case of mild reaction, administer via gravity at 20 mL/hr to keep vein open. In case of severe reaction, administer via gravity wide open on prime setting., Disp: 875037 mL, Rfl: 0     sodium " chloride, PF, 0.9% PF flush, Inject 10 mLs into the vein as needed for other (infusion reaction). For RN use only as needed for infusion reaction, Disp: 712074 mL, Rfl: 0     sodium chloride, PF, 0.9% PF flush, Inject 10 mLs into the vein as needed for line flush. Flush IV before and after medication administration as directed and/or at least every 12 hours., Disp: 333351 mL, Rfl: 0     sterile water, preservative free, injection, Use 4.8 mLs for reconstitution once every eight weeks. Reconstitute each ENTYVIO vial by slowly injecting sterile water down the inside wall of vial using 21-G needle. DO NOT SHAKE. Gently swirl vial for at least 15 seconds.  Allow the solution to sit for up to 30 minutes to allow for reconstitution and foam to settle. The vial can be swirled and inspected for dissolution during this time. Inspect for particles/discoloration. Solution should be clear or opalescent, colorless to light brownish yellow, and free of particulates.  Gently invert vial 3 times then immediately draw up appropriate dose from vial using 21-G needle., Disp: 12 mL, Rfl: 0     vedolizumab (ENTYVIO) 300 mg injection, Add to infusion 5 mLs (300 mg) once every eight weeks. Reconstitute vedolizumab (ENTYVIO) vial(s). Draw up Entyvio 60 mg/mL in a syringe using 21-G needle. Gently inject drug directly into 0.9% NaCl bag. Gently invert infusion bag to mix solution. DO NOT SHAKE. Discard remainder of vials., Disp: 3 each, Rfl: 0     vedolizumab (ENTYVIO) 300 mg injection, Inject 5 mLs (300 mg) into the vein once every eight weeks., Disp: , Rfl:     ALLERGIES: No Known Allergies    FAMILY HISTORY:   Family History   Problem Relation Age of Onset     Lung Cancer Paternal Grandfather         smoker, 55     Colon Cancer No family hx of      Crohn's Disease No family hx of      Irritable Bowel Syndrome No family hx of      Inflammatory Bowel Disease No family hx of      Ulcerative Colitis No family hx of        SOCIAL HISTORY:    Social History     Socioeconomic History     Marital status:      Spouse name: Not on file     Number of children: Not on file     Years of education: Not on file     Highest education level: Not on file   Occupational History     Not on file   Tobacco Use     Smoking status: Never     Smokeless tobacco: Never   Substance and Sexual Activity     Alcohol use: Yes     Comment: 1/day, beer or scotch     Drug use: No     Sexual activity: Not on file   Other Topics Concern     Not on file   Social History Narrative     Not on file     Social Drivers of Health     Financial Resource Strain: Not on file   Food Insecurity: Not on file   Transportation Needs: Not on file   Physical Activity: Not on file   Stress: Not on file   Social Connections: Not on file   Interpersonal Safety: Not on file   Housing Stability: Not on file       Due to the nature of this video visit, no physical exam was performed.     DIAGNOSIS: Request for sterilization    PLAN: The risks of the procedure as well as expectations for recovery and outcomes were explained in detail to him.  He was counseled on the risks for bleeding infection and pain after the procedure. We discussed the risk of post-vasectomy pain syndrome.  He was instructed to continue to use contraception until he had proven azoospermia on a semen specimen.  This would normally be collected at least 3 months after the procedure. Also discussed the rare, but possible risk of re-canalization of the vas, even after successful vasectomy with sterile semen specimen.  He was instructed to hold all anticoagulants medications for one week prior to the procedure.  It was recommended that he have someone else drive him home after his vasectomy.  In light of these risks and expectations he would like to proceed.     Pt. Understands:  -1/1000-1/3000 risk of future pregnancy even with perfectly done vasectomy  -vasectomy is a permanent procedure    -he may cryopreserve sperm if he wishes    -1-5% risk of post-vasectomy pain syndrome   -1-5% risk of complication, primarily infection or bleeding  - he needs to have a semen sample that shows no sperm before getting approval for unprotected intercourse.      He would like to proceed in the OR under [facility]. Will place surgical orders.     Thank you for the kind consultation.    15 minutes spent on the date of the encounter, including direct interaction with the patient, performing chart review, documentation and further activities as noted above.    Video-Visit Details  The patient consents to today's video visit: yes    Type of service:  Video Visit    Video Start Time: 11:05    Video End Time:11:16 AM    Originating Location (pt. Location): Home    Distant Location (provider location):  [facility]    Platform used for Video Visit: [facility]    Nicole Bonilla MD   Urology  [facility]  Clinic Phone [phone]      Again, thank you for allowing me to participate in the care of your patient.      Sincerely,    Nicole Bonilla MD

## 2025-06-30 NOTE — PROGRESS NOTES
"Virtual Visit Details    Type of service:  Video Visit     Originating Location (pt. Location): {video visit patient location:208642::\"Home\"}  {PROVIDER LOCATION On-site should be selected for visits conducted from your clinic location or adjoining [facility] hospital, academic office, or other nearby [facility] building. Off-site should be selected for all other provider locations, including home:922203}  Distant Location (provider location):  {virtual location provider:105323}  Platform used for Video Visit: {Virtual Visit Platforms:866937::\"[employer]\"}  "

## 2025-06-30 NOTE — PROGRESS NOTES
VASECTOMY CONSULTATION NOTE  DATE OF VISIT: 6/30/2025  PATIENT NAME: Raymond Aguayo    YOB: 1980      REASON FOR CONSULTATION: Mr. Raymond Aguayo is a 45 year old year old gentleman who is seen today requesting a vasectomy as a form of permanent birth control.     He has four children: 17, 15, 13, and 11.     No prior scrotal/inguinal surgeries, no hx diabetes, he does not take blood thinners, he believes his scrotum is tight.     He knows he would prefer to have the procedure in the OR as he does not enjoy being awake for medical/dental procedures.    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     PAST MEDICAL HISTORY:   Past Medical History:   Diagnosis Date    Ulcerative colitis (H) 2015    Wrist fracture, right 1992    minor, sports       PAST SURGICAL HISTORY:   Past Surgical History:   Procedure Laterality Date    COLONOSCOPY  5/2015    COLONOSCOPY N/A 1/4/2021    Procedure: COLONOSCOPY, WITH  BIOPSY;  Surgeon: Colton Valentin MD;  Location: [facility] OR    COLONOSCOPY N/A 6/13/2023    Procedure: COLONOSCOPY, WITH POLYPECTOMY AND BIOPSY;  Surgeon: Colton Valentin MD;  Location: [facility] OR     TOOTH EXTRACTION W/FORCEP  2009    SIGMOIDOSCOPY FLEXIBLE N/A 12/22/2021    Procedure: SIGMOIDOSCOPY, FLEXIBLE WITH BIOPSY;  Surgeon: Colton Valentin MD;  Location: [facility] OR       MEDICATIONS:   Current Outpatient Medications:     acetaminophen (TYLENOL) 325 MG tablet, Take 325-650 mg by mouth every 6 hours as needed for mild pain, Disp: , Rfl:     albuterol (PROAIR HFA/PROVENTIL HFA/VENTOLIN HFA) 108 (90 Base) MCG/ACT inhaler, Inhale 2 puffs into the lungs every 6 hours as needed, Disp: , Rfl:     diphenhydrAMINE (BENADRYL) 50 MG/ML injection, Inject 1 mL (50 mg) over 3-5 minutes into the vein via push as needed for other (infusion reaction). For RN use only.  Draw up in a syringe and administer IV push.  Discard remainder of vial., Disp: 75763 mL, Rfl: 0    Emergency Supply Kit, PIV,, Patient use for  "emergency only. Contents: 3 sodium chloride 0.9% flushes, 1 IV start kit, 1 microclave ext set 14\", 1 each IV Cath 22 G/1\" and 24G/3/4\", 6 alcohol prep pads, 4 nitrile gloves (med). Call [phone] to reorder., Disp: 818437 kit, Rfl: 0    EPINEPHrine (ANY BX GENERIC EQUIV) 0.3 MG/0.3ML injection 2-pack, Inject 0.3 mLs (0.3 mg) into the muscle as needed for anaphylaxis (infusion reaction). Administer into the mid-thigh in case of severe anaphylaxis (wheezing, throat tightening, mouth swelling, difficulty breathing). May repeat dose one time in 5-15 minutes if symptoms persist., Disp: 179283 mL, Rfl: 0    loratadine (CLARITIN) 10 MG tablet, Take 10 mg by mouth daily, Disp: , Rfl:     methylPREDNISolone Na Suc, PF, (SOLU-MEDROL) 125 mg/2 mL injection, Inject 2 mLs (125 mg) over 3-5 minutes into the vein via push as needed (infusion reaction). For RN use only.  Reconstitute vial. Draw up methylPREDNISolone in a syringe and administer.  Discard remainder of vial., Disp: 704821 mL, Rfl: 0    multivitamin, therapeutic (THERA-VIT) TABS tablet, Take 1 tablet by mouth daily, Disp: , Rfl:     sodium chloride 0.9% bag, Add 5 mL (300 mg) of ENTYVIO 60 mg/mL to bag immediately prior to infusing into the vein via gravity over 30 minutes every 8 weeks.  Flush bag with 30 mL NS to flush tubing., Disp: 660 mL, Rfl: 0    sodium chloride 0.9% infusion, Infuse 500 mLs into the vein as needed for other (infusion reaction). In case of mild reaction, administer via gravity at 20 mL/hr to keep vein open. In case of severe reaction, administer via gravity wide open on prime setting., Disp: 621857 mL, Rfl: 0    sodium chloride, PF, 0.9% PF flush, Inject 10 mLs into the vein as needed for other (infusion reaction). For RN use only as needed for infusion reaction, Disp: 586886 mL, Rfl: 0    sodium chloride, PF, 0.9% PF flush, Inject 10 mLs into the vein as needed for line flush. Flush IV before and after medication administration as " directed and/or at least every 12 hours., Disp: 071185 mL, Rfl: 0    sterile water, preservative free, injection, Use 4.8 mLs for reconstitution once every eight weeks. Reconstitute each ENTYVIO vial by slowly injecting sterile water down the inside wall of vial using 21-G needle. DO NOT SHAKE. Gently swirl vial for at least 15 seconds.  Allow the solution to sit for up to 30 minutes to allow for reconstitution and foam to settle. The vial can be swirled and inspected for dissolution during this time. Inspect for particles/discoloration. Solution should be clear or opalescent, colorless to light brownish yellow, and free of particulates.  Gently invert vial 3 times then immediately draw up appropriate dose from vial using 21-G needle., Disp: 12 mL, Rfl: 0    vedolizumab (ENTYVIO) 300 mg injection, Add to infusion 5 mLs (300 mg) once every eight weeks. Reconstitute vedolizumab (ENTYVIO) vial(s). Draw up Entyvio 60 mg/mL in a syringe using 21-G needle. Gently inject drug directly into 0.9% NaCl bag. Gently invert infusion bag to mix solution. DO NOT SHAKE. Discard remainder of vials., Disp: 3 each, Rfl: 0    vedolizumab (ENTYVIO) 300 mg injection, Inject 5 mLs (300 mg) into the vein once every eight weeks., Disp: , Rfl:     ALLERGIES: No Known Allergies    FAMILY HISTORY:   Family History   Problem Relation Age of Onset    Lung Cancer Paternal Grandfather         smoker, 55    Colon Cancer No family hx of     Crohn's Disease No family hx of     Irritable Bowel Syndrome No family hx of     Inflammatory Bowel Disease No family hx of     Ulcerative Colitis No family hx of        SOCIAL HISTORY:   Social History     Socioeconomic History    Marital status:      Spouse name: Not on file    Number of children: Not on file    Years of education: Not on file    Highest education level: Not on file   Occupational History    Not on file   Tobacco Use    Smoking status: Never    Smokeless tobacco: Never   Substance and  Sexual Activity    Alcohol use: Yes     Comment: 1/day, beer or scotch    Drug use: No    Sexual activity: Not on file   Other Topics Concern    Not on file   Social History Narrative    Not on file     Social Drivers of Health     Financial Resource Strain: Not on file   Food Insecurity: Not on file   Transportation Needs: Not on file   Physical Activity: Not on file   Stress: Not on file   Social Connections: Not on file   Interpersonal Safety: Not on file   Housing Stability: Not on file       Due to the nature of this video visit, no physical exam was performed.     DIAGNOSIS: Request for sterilization    PLAN: The risks of the procedure as well as expectations for recovery and outcomes were explained in detail to him.  He was counseled on the risks for bleeding infection and pain after the procedure. We discussed the risk of post-vasectomy pain syndrome.  He was instructed to continue to use contraception until he had proven azoospermia on a semen specimen.  This would normally be collected at least 3 months after the procedure. Also discussed the rare, but possible risk of re-canalization of the vas, even after successful vasectomy with sterile semen specimen.  He was instructed to hold all anticoagulants medications for one week prior to the procedure.  It was recommended that he have someone else drive him home after his vasectomy.  In light of these risks and expectations he would like to proceed.     Pt. Understands:  -1/1000-1/3000 risk of future pregnancy even with perfectly done vasectomy  -vasectomy is a permanent procedure    -he may cryopreserve sperm if he wishes   -1-5% risk of post-vasectomy pain syndrome   -1-5% risk of complication, primarily infection or bleeding  - he needs to have a semen sample that shows no sperm before getting approval for unprotected intercourse.      He would like to proceed in the OR under [facility]. Will place surgical orders.     Thank you for the kind consultation.    15  minutes spent on the date of the encounter, including direct interaction with the patient, performing chart review, documentation and further activities as noted above.    Video-Visit Details  The patient consents to today's video visit: yes    Type of service:  Video Visit    Video Start Time: 11:05    Video End Time:11:16 AM    Originating Location (pt. Location): Home    Distant Location (provider location):  [facility]    Platform used for Video Visit: Kiana Bonilla MD   Urology  [facility]  Clinic Phone [phone]

## 2025-06-30 NOTE — NURSING NOTE
Current patient location: [address] [place] [id number]    Is the patient currently in the state of [place]? YES    Visit mode: VIDEO    If the visit is dropped, the patient can be reconnected by:VIDEO VISIT: Text to cell phone:   Telephone Information:   Mobile [phone]       Will anyone else be joining the visit? NO  (If patient encounters technical issues they should call [phone]    Are changes needed to the allergy or medication list? No, Pt stated no changes to allergies, and Pt stated no med changes    Are refills needed on medications prescribed by this physician? NO    Rooming Documentation:  Not applicable    Reason for visit: Consult    Krista LO

## 2025-07-29 ENCOUNTER — HOME INFUSION (OUTPATIENT)
Dept: HOME HEALTH SERVICES | Facility: HOME HEALTH | Age: 45
End: 2025-07-29
Payer: COMMERCIAL

## 2025-07-30 ENCOUNTER — HOME INFUSION BILLING (OUTPATIENT)
Dept: HOME HEALTH SERVICES | Facility: HOME HEALTH | Age: 45
End: 2025-07-30
Payer: COMMERCIAL

## 2025-07-30 PROCEDURE — A4217 STERILE WATER/SALINE, 500 ML: HCPCS | Mod: JZ

## 2025-07-30 PROCEDURE — A4215 STERILE NEEDLE: HCPCS

## 2025-07-30 PROCEDURE — S1015 IV TUBING EXTENSION SET: HCPCS

## 2025-07-31 ENCOUNTER — HOME CARE VISIT (OUTPATIENT)
Dept: HOME HEALTH SERVICES | Facility: HOME HEALTH | Age: 45
End: 2025-07-31
Payer: COMMERCIAL

## 2025-07-31 VITALS
DIASTOLIC BLOOD PRESSURE: 80 MMHG | SYSTOLIC BLOOD PRESSURE: 110 MMHG | OXYGEN SATURATION: 96 % | TEMPERATURE: 97.3 F | HEART RATE: 83 BPM | RESPIRATION RATE: 16 BRPM

## 2025-07-31 PROCEDURE — S9338 HIT IMMUNOTHERAPY DIEM: HCPCS

## 2025-07-31 NOTE — PROGRESS NOTES
Nursing Visit Note:  Nurse visit today for Entyvio infusion, [place]  for Raymond Aguayo.     present during visit today: Not Applicable.    Intravenous Access:  Peripheral IV placed.    Infusion Nursing Note:    Pre-infusion Checklist:   Have you had any delayed reaction since last infusion?   No     Have you recently had an elevated temperature, fever, chills productive cough, coughing for 3 weeks or longer or hemoptysis, abnormal vital signs, night sweats, chest pain, or have you noticed a decrease in your appetite, or noted unexplained weight loss or fatigue?   No     Do you have any open wounds or new incisions?  No     Do you have any recent or upcoming hospitalizations, surgeries, or dental procedures? Does not include esophagogastroduodenoscopy, colonoscopy, endoscopic retrograde cholangiopancreatography (ERCP), endoscopic ultrasound (EUS), dental procedures or joint aspiration/steroid injections.   No     Do you currently have or recently have had any signs of illness or infection or are you on any antibiotics?   No     Have you had any new, sudden, or worsening abdominal pain?   No     Have you or anyone in your household received a live vaccination in the past 4 weeks?   No     Have you recently been diagnosed with any new nervous system diseases or cancer diagnosis? (i.e., Multiple Sclerosis, Guillain Varney, seizures, neurological changes) Are you receiving any form of radiation or chemotherapy?   No     Are you pregnant or breastfeeding, or do you have plans of pregnancy in the future?   No     Have you been having any signs of worsening depression or suicidal ideation?  No     Have you had any other new onset medical symptoms?  No    Entyvio/Ocrevus/Tyasabri only: Have you been having any new or worsening medical problems such as issues with thinking, eyesight, balance or strength that have persisted over several days?   No    Benlysta only: Have you been having any signs of worsening depression  "or suicidal ideations?    N/A    IVIG only: Have you had any new blood clots?  N/A    Did the patient answer \"YES\" to any of the questions above?  No     Will the patient receive a medication that has an order for infusion reaction management?  Yes, and all drugs and supplies are available and none have .     If ordered, has the patient taken pre-medications?  N/A    Plan:   Therapy is appropriate, will proceed with treatment.     Post Infusion Assessment:  Patient tolerated infusion without incident.  Blood return noted pre and post infusion.  Site patent and intact, free from redness, edema or discomfort.  No evidence of extravasations.  Access discontinued per protocol.       Note: Patient A&O , in good spirits. VSS. No reported side effects or delayed reaction from previous infusion . GI/ wnl . Just returned from a long family road trip vacation and had a great time . Tolerated interventions well     Saline administered: 50 (ml)    Supply Check:   Does the patient have all the supplies they need for the next visit?  No, Order placed for e meds     Next visit plan: [facility] Entyvio infusion, labs , [place] 25 at 1400    Kelli Neves RN 2025  "

## 2025-08-14 ENCOUNTER — ANESTHESIA (OUTPATIENT)
Dept: SURGERY | Facility: CLINIC | Age: 45
End: 2025-08-14
Payer: COMMERCIAL

## 2025-08-14 ENCOUNTER — ANESTHESIA EVENT (OUTPATIENT)
Dept: SURGERY | Facility: CLINIC | Age: 45
End: 2025-08-14
Payer: COMMERCIAL

## 2025-08-14 ENCOUNTER — HOSPITAL ENCOUNTER (OUTPATIENT)
Facility: CLINIC | Age: 45
Discharge: HOME | End: 2025-08-14
Attending: STUDENT IN AN ORGANIZED HEALTH CARE EDUCATION/TRAINING PROGRAM | Admitting: STUDENT IN AN ORGANIZED HEALTH CARE EDUCATION/TRAINING PROGRAM
Payer: COMMERCIAL

## 2025-08-14 VITALS
DIASTOLIC BLOOD PRESSURE: 64 MMHG | WEIGHT: 236.1 LBS | RESPIRATION RATE: 16 BRPM | TEMPERATURE: 97 F | HEART RATE: 80 BPM | BODY MASS INDEX: 31.98 KG/M2 | HEIGHT: 72 IN | SYSTOLIC BLOOD PRESSURE: 106 MMHG | OXYGEN SATURATION: 97 %

## 2025-08-14 DIAGNOSIS — Z30.09 CONSULTATION FOR STERILIZATION: Primary | ICD-10-CM

## 2025-08-14 PROCEDURE — 250N000009 HC RX 250: Performed by: STUDENT IN AN ORGANIZED HEALTH CARE EDUCATION/TRAINING PROGRAM

## 2025-08-14 PROCEDURE — 999N000141 HC STATISTIC PRE-PROCEDURE NURSING ASSESSMENT: Performed by: STUDENT IN AN ORGANIZED HEALTH CARE EDUCATION/TRAINING PROGRAM

## 2025-08-14 PROCEDURE — 258N000003 HC RX IP 258 OP 636: Performed by: ANESTHESIOLOGY

## 2025-08-14 PROCEDURE — 710N000012 HC RECOVERY PHASE 2, PER MINUTE: Performed by: STUDENT IN AN ORGANIZED HEALTH CARE EDUCATION/TRAINING PROGRAM

## 2025-08-14 PROCEDURE — 88302 TISSUE EXAM BY PATHOLOGIST: CPT | Mod: TC | Performed by: STUDENT IN AN ORGANIZED HEALTH CARE EDUCATION/TRAINING PROGRAM

## 2025-08-14 PROCEDURE — 55250 REMOVAL OF SPERM DUCT(S): CPT | Performed by: STUDENT IN AN ORGANIZED HEALTH CARE EDUCATION/TRAINING PROGRAM

## 2025-08-14 PROCEDURE — 88302 TISSUE EXAM BY PATHOLOGIST: CPT | Mod: 26 | Performed by: PATHOLOGY

## 2025-08-14 PROCEDURE — 250N000011 HC RX IP 250 OP 636: Performed by: STUDENT IN AN ORGANIZED HEALTH CARE EDUCATION/TRAINING PROGRAM

## 2025-08-14 PROCEDURE — 710N000009 HC RECOVERY PHASE 1, LEVEL 1, PER MIN: Performed by: STUDENT IN AN ORGANIZED HEALTH CARE EDUCATION/TRAINING PROGRAM

## 2025-08-14 PROCEDURE — 250N000009 HC RX 250: Performed by: ANESTHESIOLOGY

## 2025-08-14 PROCEDURE — 250N000011 HC RX IP 250 OP 636: Performed by: ANESTHESIOLOGY

## 2025-08-14 PROCEDURE — 370N000017 HC ANESTHESIA TECHNICAL FEE, PER MIN: Performed by: STUDENT IN AN ORGANIZED HEALTH CARE EDUCATION/TRAINING PROGRAM

## 2025-08-14 PROCEDURE — 250N000013 HC RX MED GY IP 250 OP 250 PS 637: Performed by: STUDENT IN AN ORGANIZED HEALTH CARE EDUCATION/TRAINING PROGRAM

## 2025-08-14 PROCEDURE — 360N000075 HC SURGERY LEVEL 2, PER MIN: Performed by: STUDENT IN AN ORGANIZED HEALTH CARE EDUCATION/TRAINING PROGRAM

## 2025-08-14 PROCEDURE — 272N000001 HC OR GENERAL SUPPLY STERILE: Performed by: STUDENT IN AN ORGANIZED HEALTH CARE EDUCATION/TRAINING PROGRAM

## 2025-08-14 RX ORDER — MAGNESIUM HYDROXIDE 1200 MG/15ML
LIQUID ORAL PRN
Status: DISCONTINUED | OUTPATIENT
Start: 2025-08-14 | End: 2025-08-14 | Stop reason: HOSPADM

## 2025-08-14 RX ORDER — DEXAMETHASONE SODIUM PHOSPHATE 4 MG/ML
INJECTION, SOLUTION INTRA-ARTICULAR; INTRALESIONAL; INTRAMUSCULAR; INTRAVENOUS; SOFT TISSUE PRN
Status: DISCONTINUED | OUTPATIENT
Start: 2025-08-14 | End: 2025-08-14

## 2025-08-14 RX ORDER — PROPOFOL 10 MG/ML
INJECTION, EMULSION INTRAVENOUS CONTINUOUS PRN
Status: DISCONTINUED | OUTPATIENT
Start: 2025-08-14 | End: 2025-08-14

## 2025-08-14 RX ORDER — NALOXONE HYDROCHLORIDE 0.4 MG/ML
0.1 INJECTION, SOLUTION INTRAMUSCULAR; INTRAVENOUS; SUBCUTANEOUS
Status: DISCONTINUED | OUTPATIENT
Start: 2025-08-14 | End: 2025-08-14 | Stop reason: HOSPADM

## 2025-08-14 RX ORDER — BACITRACIN ZINC 500 [USP'U]/G
OINTMENT TOPICAL PRN
Status: DISCONTINUED | OUTPATIENT
Start: 2025-08-14 | End: 2025-08-14 | Stop reason: HOSPADM

## 2025-08-14 RX ORDER — FENTANYL CITRATE 0.05 MG/ML
25 INJECTION, SOLUTION INTRAMUSCULAR; INTRAVENOUS EVERY 5 MIN PRN
Status: DISCONTINUED | OUTPATIENT
Start: 2025-08-14 | End: 2025-08-14 | Stop reason: HOSPADM

## 2025-08-14 RX ORDER — ONDANSETRON 4 MG/1
4 TABLET, ORALLY DISINTEGRATING ORAL EVERY 30 MIN PRN
Status: DISCONTINUED | OUTPATIENT
Start: 2025-08-14 | End: 2025-08-14 | Stop reason: HOSPADM

## 2025-08-14 RX ORDER — GINSENG 100 MG
CAPSULE ORAL
Status: DISCONTINUED
Start: 2025-08-14 | End: 2025-08-14 | Stop reason: HOSPADM

## 2025-08-14 RX ORDER — ONDANSETRON 2 MG/ML
INJECTION INTRAMUSCULAR; INTRAVENOUS PRN
Status: DISCONTINUED | OUTPATIENT
Start: 2025-08-14 | End: 2025-08-14

## 2025-08-14 RX ORDER — LIDOCAINE HYDROCHLORIDE 10 MG/ML
INJECTION, SOLUTION INFILTRATION; PERINEURAL
Status: DISCONTINUED
Start: 2025-08-14 | End: 2025-08-14 | Stop reason: HOSPADM

## 2025-08-14 RX ORDER — HYDROMORPHONE HCL IN WATER/PF 6 MG/30 ML
0.2 PATIENT CONTROLLED ANALGESIA SYRINGE INTRAVENOUS EVERY 5 MIN PRN
Status: DISCONTINUED | OUTPATIENT
Start: 2025-08-14 | End: 2025-08-14 | Stop reason: HOSPADM

## 2025-08-14 RX ORDER — SODIUM CHLORIDE, SODIUM LACTATE, POTASSIUM CHLORIDE, CALCIUM CHLORIDE 600; 310; 30; 20 MG/100ML; MG/100ML; MG/100ML; MG/100ML
INJECTION, SOLUTION INTRAVENOUS CONTINUOUS PRN
Status: DISCONTINUED | OUTPATIENT
Start: 2025-08-14 | End: 2025-08-14

## 2025-08-14 RX ORDER — FENTANYL CITRATE 0.05 MG/ML
50 INJECTION, SOLUTION INTRAMUSCULAR; INTRAVENOUS EVERY 5 MIN PRN
Status: DISCONTINUED | OUTPATIENT
Start: 2025-08-14 | End: 2025-08-14 | Stop reason: HOSPADM

## 2025-08-14 RX ORDER — ACETAMINOPHEN 325 MG/1
975 TABLET ORAL ONCE
Status: COMPLETED | OUTPATIENT
Start: 2025-08-14 | End: 2025-08-14

## 2025-08-14 RX ORDER — FENTANYL CITRATE 50 UG/ML
INJECTION, SOLUTION INTRAMUSCULAR; INTRAVENOUS PRN
Status: DISCONTINUED | OUTPATIENT
Start: 2025-08-14 | End: 2025-08-14

## 2025-08-14 RX ORDER — ACETAMINOPHEN 650 MG/1
650 SUPPOSITORY RECTAL ONCE
Status: COMPLETED | OUTPATIENT
Start: 2025-08-14 | End: 2025-08-14

## 2025-08-14 RX ORDER — BUPIVACAINE HYDROCHLORIDE 5 MG/ML
INJECTION, SOLUTION EPIDURAL; INTRACAUDAL; PERINEURAL
Status: DISCONTINUED
Start: 2025-08-14 | End: 2025-08-14 | Stop reason: HOSPADM

## 2025-08-14 RX ORDER — DEXAMETHASONE SODIUM PHOSPHATE 4 MG/ML
4 INJECTION, SOLUTION INTRA-ARTICULAR; INTRALESIONAL; INTRAMUSCULAR; INTRAVENOUS; SOFT TISSUE
Status: DISCONTINUED | OUTPATIENT
Start: 2025-08-14 | End: 2025-08-14 | Stop reason: HOSPADM

## 2025-08-14 RX ORDER — ONDANSETRON 2 MG/ML
4 INJECTION INTRAMUSCULAR; INTRAVENOUS EVERY 30 MIN PRN
Status: DISCONTINUED | OUTPATIENT
Start: 2025-08-14 | End: 2025-08-14 | Stop reason: HOSPADM

## 2025-08-14 RX ORDER — HYDROMORPHONE HCL IN WATER/PF 6 MG/30 ML
0.4 PATIENT CONTROLLED ANALGESIA SYRINGE INTRAVENOUS EVERY 5 MIN PRN
Status: DISCONTINUED | OUTPATIENT
Start: 2025-08-14 | End: 2025-08-14 | Stop reason: HOSPADM

## 2025-08-14 RX ORDER — LIDOCAINE HYDROCHLORIDE 20 MG/ML
INJECTION, SOLUTION INFILTRATION; PERINEURAL PRN
Status: DISCONTINUED | OUTPATIENT
Start: 2025-08-14 | End: 2025-08-14

## 2025-08-14 RX ORDER — SODIUM CHLORIDE, SODIUM LACTATE, POTASSIUM CHLORIDE, CALCIUM CHLORIDE 600; 310; 30; 20 MG/100ML; MG/100ML; MG/100ML; MG/100ML
INJECTION, SOLUTION INTRAVENOUS CONTINUOUS
Status: DISCONTINUED | OUTPATIENT
Start: 2025-08-14 | End: 2025-08-14 | Stop reason: HOSPADM

## 2025-08-14 RX ADMIN — DEXAMETHASONE SODIUM PHOSPHATE 4 MG: 4 INJECTION, SOLUTION INTRA-ARTICULAR; INTRALESIONAL; INTRAMUSCULAR; INTRAVENOUS; SOFT TISSUE at 08:58

## 2025-08-14 RX ADMIN — PROPOFOL 150 MCG/KG/MIN: 10 INJECTION, EMULSION INTRAVENOUS at 08:46

## 2025-08-14 RX ADMIN — SODIUM CHLORIDE, SODIUM LACTATE, POTASSIUM CHLORIDE, AND CALCIUM CHLORIDE: .6; .31; .03; .02 INJECTION, SOLUTION INTRAVENOUS at 08:46

## 2025-08-14 RX ADMIN — LIDOCAINE HYDROCHLORIDE 50 MG: 20 INJECTION, SOLUTION INFILTRATION; PERINEURAL at 08:46

## 2025-08-14 RX ADMIN — FENTANYL CITRATE 50 MCG: 50 INJECTION INTRAMUSCULAR; INTRAVENOUS at 08:46

## 2025-08-14 RX ADMIN — ACETAMINOPHEN 975 MG: 325 TABLET ORAL at 07:15

## 2025-08-14 RX ADMIN — ONDANSETRON 4 MG: 2 INJECTION INTRAMUSCULAR; INTRAVENOUS at 08:58

## 2025-08-14 RX ADMIN — MIDAZOLAM 2 MG: 1 INJECTION INTRAMUSCULAR; INTRAVENOUS at 08:46

## 2025-08-14 ASSESSMENT — ACTIVITIES OF DAILY LIVING (ADL)
ADLS_ACUITY_SCORE: 41

## 2025-08-14 ASSESSMENT — ENCOUNTER SYMPTOMS
DYSRHYTHMIAS: 0
SEIZURES: 0

## 2025-08-14 ASSESSMENT — LIFESTYLE VARIABLES: TOBACCO_USE: 0

## 2025-08-15 LAB
PATH REPORT.COMMENTS IMP SPEC: NORMAL
PATH REPORT.COMMENTS IMP SPEC: NORMAL
PATH REPORT.FINAL DX SPEC: NORMAL
PATH REPORT.GROSS SPEC: NORMAL
PATH REPORT.MICROSCOPIC SPEC OTHER STN: NORMAL
PATH REPORT.RELEVANT HX SPEC: NORMAL
PHOTO IMAGE: NORMAL

## (undated) DEVICE — ENDO FORCEP BX CAPTURA PRO SPIKE G50696

## (undated) DEVICE — PACK MINOR SBA15MIFSE

## (undated) DEVICE — TUBING SUCTION 12"X1/4" N612

## (undated) DEVICE — SPECIMEN CONTAINER 3OZ W/FORMALIN 59901

## (undated) DEVICE — SUCTION MANIFOLD NEPTUNE 2 SYS 1 PORT 702-025-000

## (undated) DEVICE — SOL NACL 0.9% IRRIG 1000ML BOTTLE 2F7124

## (undated) DEVICE — SOL WATER IRRIG 500ML BOTTLE 2F7113

## (undated) DEVICE — KIT TURNOVER FAIRVIEW SOUTHDALE FULL SP3889

## (undated) DEVICE — LINEN TOWEL PACK X5 5464

## (undated) DEVICE — SU CHROMIC 4-0 RB-1 27" U203H

## (undated) DEVICE — PANTIES MESH LG/XLG 2PK 706M2

## (undated) DEVICE — TUBING SUCTION MEDI-VAC 1/4"X20' N620A

## (undated) DEVICE — ESU ELEC NDL 1" E1552

## (undated) DEVICE — DRAPE MINOR PROCEDURE LAP 29496

## (undated) DEVICE — ESU PENCIL SMOKE EVAC W/ROCKER SWITCH 0703-047-000

## (undated) DEVICE — KIT ENDO TURNOVER/PROCEDURE CARRY-ON 101822

## (undated) DEVICE — SUPPORTER ATHLETIC LG LATEX 202636

## (undated) DEVICE — GOWN IMPERVIOUS 2XL BLUE

## (undated) DEVICE — SU VICRYL 4-0 PS-2 18" UND J496H

## (undated) DEVICE — GLOVE PROTEXIS BLUE W/NEU-THERA 6.5  2D73EB65

## (undated) DEVICE — ESU GROUND PAD E7506

## (undated) DEVICE — GLOVE PROTEXIS BLUE W/NEU-THERA 7.0  2D73EB70

## (undated) DEVICE — SOL WATER IRRIG 1000ML BOTTLE 2F7114

## (undated) DEVICE — PREP CHLORAPREP 26ML TINTED HI-LITE ORANGE 930815

## (undated) DEVICE — KIT ENDO FIRST STEP DISINFECTANT 200ML W/POUCH EP-4

## (undated) DEVICE — SNARE CAPIVATOR ROUND COLD SNR BX10 M00561101

## (undated) RX ORDER — DEXAMETHASONE SODIUM PHOSPHATE 4 MG/ML
INJECTION, SOLUTION INTRA-ARTICULAR; INTRALESIONAL; INTRAMUSCULAR; INTRAVENOUS; SOFT TISSUE
Status: DISPENSED
Start: 2025-08-14

## (undated) RX ORDER — DIPHENHYDRAMINE HYDROCHLORIDE 50 MG/ML
INJECTION INTRAMUSCULAR; INTRAVENOUS
Status: DISPENSED
Start: 2021-01-04

## (undated) RX ORDER — FENTANYL CITRATE 50 UG/ML
INJECTION, SOLUTION INTRAMUSCULAR; INTRAVENOUS
Status: DISPENSED
Start: 2021-01-04

## (undated) RX ORDER — ONDANSETRON 2 MG/ML
INJECTION INTRAMUSCULAR; INTRAVENOUS
Status: DISPENSED
Start: 2025-08-14

## (undated) RX ORDER — ONDANSETRON 2 MG/ML
INJECTION INTRAMUSCULAR; INTRAVENOUS
Status: DISPENSED
Start: 2021-01-04

## (undated) RX ORDER — FENTANYL CITRATE 50 UG/ML
INJECTION, SOLUTION INTRAMUSCULAR; INTRAVENOUS
Status: DISPENSED
Start: 2025-08-14

## (undated) RX ORDER — ACETAMINOPHEN 325 MG/1
TABLET ORAL
Status: DISPENSED
Start: 2025-08-14